# Patient Record
Sex: FEMALE | Race: WHITE | Employment: FULL TIME | URBAN - METROPOLITAN AREA
[De-identification: names, ages, dates, MRNs, and addresses within clinical notes are randomized per-mention and may not be internally consistent; named-entity substitution may affect disease eponyms.]

---

## 2017-01-03 ENCOUNTER — HOSPITAL ENCOUNTER (EMERGENCY)
Age: 57
Discharge: HOME | End: 2017-01-03
Attending: EMERGENCY MEDICINE

## 2017-01-03 VITALS
DIASTOLIC BLOOD PRESSURE: 72 MMHG | WEIGHT: 132 LBS | TEMPERATURE: 97.5 F | HEIGHT: 66 IN | HEART RATE: 84 BPM | OXYGEN SATURATION: 98 % | RESPIRATION RATE: 16 BRPM | BODY MASS INDEX: 21.21 KG/M2 | SYSTOLIC BLOOD PRESSURE: 124 MMHG

## 2017-01-03 DIAGNOSIS — H10.31 ACUTE CONJUNCTIVITIS OF RIGHT EYE, UNSPECIFIED ACUTE CONJUNCTIVITIS TYPE: Primary | ICD-10-CM

## 2017-01-03 RX ORDER — GENTAMICIN SULFATE 3 MG/ML
1 SOLUTION/ DROPS OPHTHALMIC EVERY 4 HOURS
Qty: 5 ML | Refills: 0 | Status: SHIPPED | OUTPATIENT
Start: 2017-01-03 | End: 2017-01-10

## 2017-01-03 RX ORDER — MECLIZINE HYDROCHLORIDE 25 MG/1
TABLET ORAL
COMMUNITY
End: 2018-03-22

## 2017-01-03 RX ORDER — AZELASTINE HYDROCHLORIDE, FLUTICASONE PROPIONATE 137; 50 UG/1; UG/1
SPRAY, METERED NASAL
COMMUNITY

## 2017-01-03 NOTE — DISCHARGE INSTRUCTIONS
Pinkeye: Care Instructions  Your Care Instructions    Pinkeye is redness and swelling of the eye surface and the conjunctiva (the lining of the eyelid and the covering of the white part of the eye). Pinkeye is also called conjunctivitis. Pinkeye is often caused by infection with bacteria or a virus. Dry air, allergies, smoke, and chemicals are other common causes. Pinkeye often clears on its own in 7 to 10 days. Antibiotics only help if the pinkeye is caused by bacteria. Pinkeye caused by infection spreads easily. If an allergy or chemical is causing pinkeye, it will not go away unless you can avoid whatever is causing it. Follow-up care is a key part of your treatment and safety. Be sure to make and go to all appointments, and call your doctor if you are having problems. Its also a good idea to know your test results and keep a list of the medicines you take. How can you care for yourself at home? · Wash your hands often. Always wash them before and after you treat pinkeye or touch your eyes or face. · Use moist cotton or a clean, wet cloth to remove crust. Wipe from the inside corner of the eye to the outside. Use a clean part of the cloth for each wipe. · Put cold or warm wet cloths on your eye a few times a day if the eye hurts. · Do not wear contact lenses or eye makeup until the pinkeye is gone. Throw away any eye makeup you were using when you got pinkeye. Clean your contacts and storage case. If you wear disposable contacts, use a new pair when your eye has cleared and it is safe to wear contacts again. · If the doctor gave you antibiotic ointment or eyedrops, use them as directed. Use the medicine for as long as instructed, even if your eye starts looking better soon. Keep the bottle tip clean, and do not let it touch the eye area. · To put in eyedrops or ointment:  ¨ Tilt your head back, and pull your lower eyelid down with one finger.   ¨ Drop or squirt the medicine inside the lower lid.  ¨ Close your eye for 30 to 60 seconds to let the drops or ointment move around. ¨ Do not touch the ointment or dropper tip to your eyelashes or any other surface. · Do not share towels, pillows, or washcloths while you have pinkeye. When should you call for help? Call your doctor now or seek immediate medical care if:  · You have pain in your eye, not just irritation on the surface. · You have a change in vision or loss of vision. · You have an increase in discharge from the eye. · Your eye has not started to improve or begins to get worse within 48 hours after you start using antibiotics. · Pinkeye lasts longer than 7 days. Watch closely for changes in your health, and be sure to contact your doctor if you have any problems. Where can you learn more? Go to http://geraldo-samuel.info/. Enter Y392 in the search box to learn more about \"Pinkeye: Care Instructions. \"  Current as of: May 27, 2016  Content Version: 11.1  © 0566-5574 [employer], [employer]. Care instructions adapted under license by [employer] (which disclaims liability or warranty for this information). If you have questions about a medical condition or this instruction, always ask your healthcare professional. [address] any warranty or liability for your use of this information.

## 2017-01-03 NOTE — UC PROVIDER NOTE
Patient is a 61 y.o. female presenting with eye problem. The history is provided by the patient. Eye Problem    This is a new problem. The current episode started more than 2 days ago. The problem occurs constantly. The problem has been gradually worsening. The right eye is affected. The injury mechanism was none. The patient is experiencing no pain. There is no history of trauma to the eye. There is no known exposure to pink eye. She does not wear contacts. Associated symptoms include discharge (mild this AM), foreign body sensation, eye redness and itching. Pertinent negatives include no numbness, no blurred vision, no decreased vision, no double vision, no photophobia, no tingling, no pain and no dizziness. She has tried eye drops for the symptoms. The treatment provided no relief. Past Medical History   Diagnosis Date    Anxiety 10/25/2012    Fatigue     Headache     HTN (hypertension) 10/25/2012    Insomnia 10/25/2012    Lumbar back pain 10/25/2012     Steroid injections,  Dr. Abhi Niño RA (rheumatoid arthritis) ([facility] [address] 10/25/2012    Skipped beats         Past Surgical History   Procedure Laterality Date    Hx  section       2    Hx heent       deviated septum    Hx orthopaedic       R metatarsal joint fusion    Hx breast reduction      Pr neurological procedure unlisted       lumbar radio frequency neurotomy         Family History   Problem Relation Age of Onset    Hypertension Mother    [employer] Asthma Mother     Arthritis-rheumatoid Mother     Asthma Maternal Aunt     Diabetes Maternal Grandfather     Asthma Other     Other Other      scleroderma    Cancer Other     Migraines Other     Stroke Other         Social History     Social History    Marital status:      Spouse name: N/A    Number of children: N/A    Years of education: N/A     Occupational History    Not on file.      Social History Main Topics    Smoking status: Never Smoker    Smokeless tobacco: Never Used    Alcohol use Yes      Comment: socially    Drug use: No    Sexual activity: No     Other Topics Concern    Not on file     Social History Narrative                ALLERGIES: Azithromycin; Carafate [sucralfate]; Cephalexin hcl; Enbrel [etanercept]; Penicillin g; Protonix [pantoprazole]; and Tamiflu [oseltamivir phosphate]    Review of Systems   Eyes: Positive for discharge (mild this AM) and redness. Negative for blurred vision, double vision, photophobia and pain. Skin: Positive for itching. Neurological: Negative for dizziness, tingling and numbness. Vitals:    01/03/17 1154 01/03/17 1158   BP:  124/72   Pulse:  84   Resp:  16   Temp:  97.5 °F (36.4 °C)   SpO2:  98%   Weight: 59.9 kg (132 lb)    Height: 5' 6\" (1.676 m)        Physical Exam   Constitutional: She appears well-developed and well-nourished. HENT:   Head: Normocephalic and atraumatic. Eyes: EOM and lids are normal. Pupils are equal, round, and reactive to light. Lids are everted and swept, no foreign bodies found. Right eye exhibits no chemosis, no discharge, no exudate and no hordeolum. No foreign body present in the right eye. Left eye exhibits no chemosis, no discharge, no exudate and no hordeolum. No foreign body present in the left eye. Right conjunctiva is injected. Right conjunctiva has no hemorrhage. Left conjunctiva is not injected. No scleral icterus. Fluorescein exam performed with proparacaine and woods lamp and no focal uptake was noted and no FB seen. Pt tolerated the procedure well   Skin: Skin is warm and dry. No rash noted. No erythema. No pallor. Psychiatric: She has a normal mood and affect. Her behavior is normal. Judgment and thought content normal.   Nursing note and vitals reviewed. MDM     Differential Diagnosis; Clinical Impression; Plan:     CLINICAL IMPRESSION:  Acute conjunctivitis of right eye, unspecified acute conjunctivitis type  (primary encounter diagnosis)    Plan:  1. genoptic  2.  Fu with optho  3. Risk of Significant Complications, Morbidity, and/or Mortality:   Presenting problems: Moderate  Management options:   Moderate  Progress:   Patient progress:  Stable      Procedures

## 2017-08-19 RX ORDER — ELETRIPTAN HYDROBROMIDE 40 MG/1
TABLET, FILM COATED ORAL
Qty: 9 TAB | Refills: 5 | Status: SHIPPED | OUTPATIENT
Start: 2017-08-19 | End: 2018-01-08 | Stop reason: SDUPTHER

## 2017-09-12 ENCOUNTER — OFFICE VISIT (OUTPATIENT)
Dept: ONCOLOGY | Age: 57
End: 2017-09-12

## 2017-09-12 VITALS
RESPIRATION RATE: 16 BRPM | DIASTOLIC BLOOD PRESSURE: 89 MMHG | OXYGEN SATURATION: 95 % | BODY MASS INDEX: 20.06 KG/M2 | SYSTOLIC BLOOD PRESSURE: 126 MMHG | HEIGHT: 66 IN | WEIGHT: 124.8 LBS | HEART RATE: 91 BPM | TEMPERATURE: 98.9 F

## 2017-09-12 DIAGNOSIS — M06.9 RHEUMATOID ARTHRITIS, INVOLVING UNSPECIFIED SITE, UNSPECIFIED RHEUMATOID FACTOR PRESENCE: ICD-10-CM

## 2017-09-12 DIAGNOSIS — D61.818 PANCYTOPENIA (HCC): Primary | ICD-10-CM

## 2017-09-12 DIAGNOSIS — N39.0 URINARY TRACT INFECTION WITHOUT HEMATURIA, SITE UNSPECIFIED: ICD-10-CM

## 2017-09-12 DIAGNOSIS — D61.818 PANCYTOPENIA (HCC): ICD-10-CM

## 2017-09-12 RX ORDER — LEFLUNOMIDE 10 MG/1
TABLET ORAL
COMMUNITY
Start: 2017-09-04 | End: 2018-03-22

## 2017-09-12 RX ORDER — CITALOPRAM 20 MG/1
20 TABLET, FILM COATED ORAL DAILY
COMMUNITY
Start: 2017-08-30 | End: 2020-03-16

## 2017-09-12 RX ORDER — NITROFURANTOIN 25; 75 MG/1; MG/1
100 CAPSULE ORAL 2 TIMES DAILY
Qty: 10 CAP | Refills: 0 | Status: SHIPPED | OUTPATIENT
Start: 2017-09-12 | End: 2017-09-22 | Stop reason: ALTCHOICE

## 2017-09-12 RX ORDER — EPINEPHRINE 0.3 MG/.3ML
INJECTION SUBCUTANEOUS
COMMUNITY
Start: 2017-08-31 | End: 2018-07-17

## 2017-09-12 NOTE — Clinical Note
Please fax note to Dr. Mei Willis  Notify patient that urine is growing E coli bacteria and check if she is feeling better on the antibiotics

## 2017-09-12 NOTE — MR AVS SNAPSHOT
Visit Information Date & Time Provider Department Dept. Phone Encounter #  
 9/12/2017  3:00 PM Caty Zamora MD [facility] at [place] [id number]  Your Appointments 9/26/2017  3:00 PM  
Follow Up with Misbah Martel DO [facility] at [facility] JUAREZ) Appt Note: 2 Week Follow Up  
 [address] [place] [address] [id number]  
[phone]  
  
   
 [address] [address] Upcoming Health Maintenance Date Due Hepatitis C Screening 1960 DTaP/Tdap/Td series (1 - Tdap) 9/7/1980 PAP AKA CERVICAL CYTOLOGY 1/1/2016 BREAST CANCER SCRN MAMMOGRAM 12/16/2016 INFLUENZA AGE 9 TO ADULT 8/1/2017 COLONOSCOPY 1/1/2021 Allergies as of 9/12/2017  Review Complete On: 9/12/2017 By: Karyle Dessert, LPN Severity Noted Reaction Type Reactions Azithromycin  10/25/2012   Side Effect Rash Carafate [Sucralfate]  07/07/2016    Swelling Cephalexin Hcl  10/25/2012   Side Effect Rash Enbrel [Etanercept]  10/20/2015    Other (comments) Blisters at injection sites Penicillin G  10/25/2012    Rash Protonix [Pantoprazole]  07/07/2016    Swelling Tamiflu [Oseltamivir Phosphate]  12/29/2014   Systemic Swelling Current Immunizations  Reviewed on 11/21/2014 Name Date Influenza High Dose Vaccine PF 10/15/2014 Influenza Vaccine Whole 10/1/2012 Pneumococcal Vaccine (Unspecified Type) 11/21/2013 Not reviewed this visit You Were Diagnosed With   
  
 Codes Comments Pancytopenia ([facility]    -  Primary ICD-10-CM: G63.707 ICD-9-CM: 284.19 Vitals BP Pulse Temp Resp Height(growth percentile) Weight(growth percentile) 126/89 (BP 1 Location: Right arm, BP Patient Position: Sitting) 91 98.9 °F (37.2 °C) (Oral) 16 5' 6\" (1.676 m) 124 lb 12.8 oz (56.6 kg) SpO2 BMI OB Status Smoking Status 95% 20.14 kg/m2 Postmenopausal Never Smoker BMI and BSA Data Body Mass Index Body Surface Area  
 20.14 kg/m 2 1.62 m 2 Preferred Pharmacy Pharmacy Name Phone [facility], [address] [phone] Your Updated Medication List  
  
   
This list is accurate as of: 9/12/17  3:50 PM.  Always use your most recent med list.  
  
  
  
  
 citalopram 20 mg tablet Commonly known as:  CELEXA  
  
 cyclobenzaprine 10 mg tablet Commonly known as:  FLEXERIL Take 1 Tab by mouth three (3) times daily as needed. DYMISTA 137-50 mcg/spray [place] Generic drug:  azelastine-fluticasone  
by Nasal route. * EPINEPHrine 0.15 mg/0.3 mL injection Commonly known as:  EPIPEN JR  
0.15 mg by IntraMUSCular route once as needed. * EPINEPHrine 0.3 mg/0.3 mL injection Commonly known as:  EPIPEN  
  
 fluticasone 110 mcg/actuation inhaler Commonly known as:  FLOVENT HFA Take 2 Puffs by inhalation two (2) times a day. leflunomide 10 mg tablet Commonly known as:  ARAVA  
  
 losartan 50 mg tablet Commonly known as:  COZAAR  
TAKE ONE TABLET BY MOUTH DAILY  
  
 meclizine 25 mg tablet Commonly known as:  ANTIVERT Take  by mouth three (3) times daily as needed. MOVANTIK 25 mg Tab tablet Generic drug:  naloxegol  
  
 nitrofurantoin (macrocrystal-monohydrate) 100 mg capsule Commonly known as:  MACROBID Take 1 Cap by mouth two (2) times a day. Indications: BACTERIAL URINARY TRACT INFECTION  
  
 * ORENCIA 125 mg/mL Syrg Generic drug:  abatacept * ORENCIA CLICKJECT 809 mg/mL [place] Generic drug:  abatacept PROBIOTIC 4X 10-15 mg Tbec Generic drug:  B.infantis-B.ani-B.long-B.bifi Take 2 tablets by mouth daily. RELPAX 40 mg tablet Generic drug:  eletriptan TAKE ONE TABLET BY MOUTH AT ONSET OF HEADACHE. MAY REPEAT IN 2 HOURS IF NEEDED.  **DO NOT TAKE MORE THAN 2 TABELTS PER DAY**  
  
 REMICADE IV  
 by IntraVENous route every six (6) weeks. sucralfate 100 mg/mL suspension Commonly known as:  Denis Salvatorech Take 10 mL by mouth four (4) times daily. * VENTOLIN HFA 90 mcg/actuation inhaler Generic drug:  albuterol INHALE TWO PUFFS BY MOUTH EVERY 4 HOURS AS NEEDED  
  
 * albuterol 2.5 mg /3 mL (0.083 %) nebulizer solution Commonly known as:  PROVENTIL VENTOLIN  
3 mL by Nebulization route every four (4) hours as needed for Wheezing. VITAMIN B-12 1,000 mcg tablet Generic drug:  cyanocobalamin Take 1,000 mcg by mouth daily. VITAMIN C 500 mg tablet Generic drug:  ascorbic acid (vitamin C) Take 1,000 mg by mouth daily. VITAMIN D3 2,000 unit Tab Generic drug:  cholecalciferol (vitamin D3) Take 2 tablets by mouth daily. * Notice: This list has 6 medication(s) that are the same as other medications prescribed for you. Read the directions carefully, and ask your doctor or other care provider to review them with you. Prescriptions Sent to Pharmacy Refills  
 nitrofurantoin, macrocrystal-monohydrate, (MACROBID) 100 mg capsule 0 Sig: Take 1 Cap by mouth two (2) times a day. Indications: BACTERIAL URINARY TRACT INFECTION Class: Normal  
 Pharmacy: [facility], [address] #: [phone] Route: Oral  
  
To-Do List   
 09/12/2017 Lab:  CBC WITH AUTOMATED DIFF   
  
 09/12/2017 Imaging:  CT BX BONE MARROW NDL/TROCAR   
  
 09/12/2017 Microbiology:  CULTURE, URINE   
  
 09/12/2017 Lab:  FERRITIN   
  
 09/12/2017 Lab:  GAMMOPATHY EVAL, SPEP/KHOI, IG QT/FLC   
  
 09/12/2017 Lab:  HEP B SURFACE AG   
  
 09/12/2017 Lab:  HEPATITIS C AB   
  
 09/12/2017 Lab:  HIV 1/2 AG/AB, 4TH GENERATION,W RFLX CONFIRM   
  
 09/12/2017 Lab:  IRON PROFILE   
  
 09/12/2017 Lab:  LD   
  
 09/12/2017 Lab:  METHYLMALONIC ACID   
  
 09/12/2017 Pathology:  PATHOLOGIST REVIEW SMEARS   
  
 09/12/2017 Lab:  URINALYSIS W/ RFLX MICROSCOPIC   
  
 09/12/2017 Lab:  VITAMIN B12 Introducing [facility] SERVICES! Dear Charlena Fleischer: Thank you for requesting a [employer] account. Our records indicate that you already have an active [employer] account. You can access your account anytime at [email or web address]"[email or web address]. [employer]/"[employer] Did you know that you can access your hospital and ER discharge instructions at any time in [employer]? You can also review all of your test results from your hospital stay or ER visit. Additional Information If you have questions, please visit the Frequently Asked Questions section of the [employer] website at [email or web address]/"[email or web address]/. Remember, [employer] is NOT to be used for urgent needs. For medical emergencies, dial 911. Now available from your iPhone and Android! Please provide this summary of care documentation to your next provider. Your primary care clinician is listed as Gi De Jesus. If you have any questions after today's visit, please call [phone].

## 2017-09-12 NOTE — PROGRESS NOTES
Luiz Whitley is a 61 y.o. female new patient referred to provider by Dr. Wan Degroot for low blood count. Patient states she has a migraine. Pain states she has burning, pain and frequency when urinating; patient states she think she has a urinary infection; symptoms started yesterday. Patient states she feels bad and her glands feel swollen. VS stable.

## 2017-09-12 NOTE — PROGRESS NOTES
Hematology Consult    REASON FOR CONSULT: Leucopenia  REQUESTED BY: Dr. Luis Wolf: Ms. Jalyn Casiano is a 61 y.o. female with anxiety, hypertension, insomnia, history of gluten sensitivity, asthma, migraines, hyperlipidemia, nonmelanoma skin cancer, rheumatoid arthritis  who presents for evaluation of pancytopenia. She has previously received treatments with Arava and Humira followed by Acterma without much benefit. She was then changed to CellCept and Plaquenil. This was followed by Debbie Subramanian but she then developed a flare of uveitis at which point she was changed to Remicade about 4-5 months ago. Her most recent absolute neutrophil count was 500. She has had leukopenia for several months now with an [address] of 1100 on 17, 600 on 17. During this time she has had a relatively stable hemoglobin between 11-12 g/dL and normal platelet counts. She has generalized malaise for months, She has a sore throat, has painful neck glands in her neck for 2-3 days. No bleeding, has chronic HA, no fevers, no energy, has no will to exercise, has lost appetite and weight, the latter about 20 lb in 6 months. No bleeding, no new rashes. She was started on Celexa for a week for anxiety due to a lot of personal stress. She has a h/o recurrent sinopulmonary infections. She is having dysuria    FH of anemia, generations have had rheumatoid arthritis. Never smokes, occasional ETOH.        Past Medical History:   Diagnosis Date    Anxiety 10/25/2012    Fatigue     Headache     HTN (hypertension) 10/25/2012    Insomnia 10/25/2012    Lumbar back pain 10/25/2012    Steroid injections,  [facility] Enriquez [facility] 10/25/2012    Skipped beats        Past Surgical History:   Procedure Laterality Date    HX BREAST REDUCTION      HX  SECTION      2    HX HEENT      deviated septum    HX ORTHOPAEDIC      R metatarsal joint fusion    NEUROLOGICAL PROCEDURE UNLISTED      lumbar radio frequency neurotomy       Allergies   Allergen Reactions    Azithromycin Rash    Carafate [Sucralfate] Swelling    Cephalexin Hcl Rash    Enbrel [Etanercept] Other (comments)     Blisters at injection sites    Penicillin G Rash    Protonix [Pantoprazole] Swelling    Tamiflu [Oseltamivir Phosphate] Swelling       Current Outpatient Prescriptions   Medication Sig Dispense Refill    citalopram (CELEXA) 20 mg tablet       EPINEPHrine (EPIPEN) 0.3 mg/0.3 mL injection       INFLIXIMAB (REMICADE IV) by IntraVENous route every six (6) weeks.  RELPAX 40 mg tablet TAKE ONE TABLET BY MOUTH AT ONSET OF HEADACHE. MAY REPEAT IN 2 HOURS IF NEEDED. **DO NOT TAKE MORE THAN 2 TABELTS PER DAY** 9 Tab 5    azelastine-fluticasone (DYMISTA) 137-50 mcg/spray spry by Nasal route.  MOVANTIK 25 mg tab tablet       losartan (COZAAR) 50 mg tablet TAKE ONE TABLET BY MOUTH DAILY 90 Tab 0    albuterol (PROVENTIL VENTOLIN) 2.5 mg /3 mL (0.083 %) nebulizer solution 3 mL by Nebulization route every four (4) hours as needed for Wheezing. 1 Package 4    VENTOLIN HFA 90 mcg/actuation inhaler INHALE TWO PUFFS BY MOUTH EVERY 4 HOURS AS NEEDED 1 Inhaler 4    cyclobenzaprine (FLEXERIL) 10 mg tablet Take 1 Tab by mouth three (3) times daily as needed. 90 Tab 1    fluticasone (FLOVENT HFA) 110 mcg/actuation inhaler Take 2 Puffs by inhalation two (2) times a day. 1 Inhaler 5    ascorbic acid (VITAMIN C) 500 mg tablet Take 1,000 mg by mouth daily.  cholecalciferol, vitamin D3, (VITAMIN D3) 2,000 unit Tab Take 2 tablets by mouth daily.  cyanocobalamin (VITAMIN B-12) 1,000 mcg tablet Take 1,000 mcg by mouth daily.  B.infantis-B.ani-B.long-B.bifi (PROBIOTIC 4X) 10-15 mg Tab Take 2 tablets by mouth daily.  leflunomide (ARAVA) 10 mg tablet       meclizine (ANTIVERT) 25 mg tablet Take  by mouth three (3) times daily as needed.       ORENCIA CLICKJECT 845 mg/mL Ram[facility]rez      [facility] 125 mg/mL syrg       sucralfate (CARAFATE) 100 mg/mL suspension Take 10 mL by mouth four (4) times daily. 1000 mL 1    EPINEPHrine (EPIPEN JR) 0.15 mg/0.3 mL (1:2,000) injection 0.15 mg by IntraMUSCular route once as needed. Social History     Social History    Marital status:      Spouse name: N/A    Number of children: N/A    Years of education: N/A     Social History Main Topics    Smoking status: Never Smoker    Smokeless tobacco: Never Used    Alcohol use Yes      Comment: socially    Drug use: No    Sexual activity: No     Other Topics Concern    None     Social History Narrative       Family History   Problem Relation Age of Onset    Hypertension Mother     Asthma Mother     Arthritis-rheumatoid Mother     Asthma Maternal Aunt     Diabetes Maternal Grandfather     Asthma Other     Other Other      scleroderma    Cancer Other     Migraines Other     Stroke Other        ROS  A 12 point review of systems was obtained and is negative except as listed in HPI.   ECOG PS is 1  Emotional well being addressed and patient is coping well    Physical Examination:   Visit Vitals    /89 (BP 1 Location: Right arm, BP Patient Position: Sitting)    Pulse 91    Temp 98.9 °F (37.2 °C) (Oral)    Resp 16    Ht 5' 6\" (1.676 m)    Wt 124 lb 12.8 oz (56.6 kg)    SpO2 95%    BMI 20.14 kg/m2     General appearance - alert, well appearing, and in no distress  Mental status - oriented to person, place, and time  Mouth - mucous membranes moist, pharynx normal without lesions  Neck - supple, no significant adenopathy  Lymphatics - no palpable lymphadenopathy, no hepatosplenomegaly  Chest - clear to auscultation, no wheezes, rales or rhonchi, symmetric air entry  Heart - normal rate, regular rhythm, normal S1, S2, no murmurs, rubs, clicks or gallops  Abdomen - soft, nontender, nondistended, no masses or organomegaly, bowel sounds present  Back exam - full range of motion, no tenderness, palpable spasm or pain on motion  Neurological - normal speech, no focal findings or movement disorder noted  Musculoskeletal - no joint tenderness, deformity or swelling  Extremities - peripheral pulses normal, no pedal edema, no clubbing or cyanosis  Skin - normal coloration and turgor, no rashes, no suspicious skin lesions noted    LABS  Lab Results   Component Value Date/Time    WBC 5.8 05/16/2016 02:00 PM    HGB 13.1 05/16/2016 02:00 PM    HCT 40.0 05/16/2016 02:00 PM    PLATELET 569 53/82/0859 02:00 PM    MCV 90.9 05/16/2016 02:00 PM    ABS. NEUTROPHILS 4.0 05/16/2016 02:00 PM     Lab Results   Component Value Date/Time    Sodium 139 05/16/2016 02:00 PM    Potassium 4.5 05/16/2016 02:00 PM    Chloride 102 05/16/2016 02:00 PM    CO2 29 05/16/2016 02:00 PM    Glucose 103 05/16/2016 02:00 PM    BUN 15 05/16/2016 02:00 PM    Creatinine 0.88 05/16/2016 02:00 PM    GFR est AA >60 05/16/2016 02:00 PM    GFR est non-AA >60 05/16/2016 02:00 PM    Calcium 9.8 05/16/2016 02:00 PM    BUN (POC) 16 05/16/2016 02:02 PM    Calcium, ionized (POC) 1.19 05/16/2016 02:02 PM     Lab Results   Component Value Date/Time    AST (SGOT) 28 05/16/2016 02:00 PM    Alk. phosphatase 93 05/16/2016 02:00 PM    Protein, total 7.3 05/16/2016 02:00 PM    Albumin 4.0 05/16/2016 02:00 PM    Globulin 3.3 05/16/2016 02:00 PM    A-G Ratio 1.2 05/16/2016 02:00 PM         ASSESSMENT  Ms. Roselia Lyon is a 61 y.o. female with RA on several prior treatments (arava , Humira , Actemra , CellCept ,Plaquenil, Enbrel, Sharlee Mullet ) and was recently transitioned to Remicade who is seen for  persistent neutropenia      PLAN  Neutropenia  Persistent, may be secondary to her RA medications -notably Actemra, cellcept, plaquenil  However, patients with RA are at increased risk of hematologic malignancies in particular Lymphoma and we will rule that out    - cbc diff, iron profile, ferritin, gammopathy eval, HIV, hep panel, LD, B12, BM biopsy    Dysuria- UTI  UA, cultures, empiric Macrobid    RA   Management per Dr. Jun Bishop    HTN  On Cozaar    RTC 2 weeks to review results       Sridhar Dallas MD    MsHollie Dunbar has a reminder for a \"due or due soon\" health maintenance. I have asked that she contact her primary care provider for follow-up on this health maintenance.

## 2017-09-15 LAB
ALBUMIN SERPL ELPH-MCNC: 4.2 G/DL (ref 2.9–4.4)
ALBUMIN/GLOB SERPL: 1.5 {RATIO} (ref 0.7–1.7)
ALPHA1 GLOB SERPL ELPH-MCNC: 0.2 G/DL (ref 0–0.4)
ALPHA2 GLOB SERPL ELPH-MCNC: 0.7 G/DL (ref 0.4–1)
APPEARANCE UR: ABNORMAL
B-GLOBULIN SERPL ELPH-MCNC: 0.9 G/DL (ref 0.7–1.3)
BACTERIA #/AREA URNS HPF: ABNORMAL /[HPF]
BACTERIA UR CULT: ABNORMAL
BASOPHILS # BLD AUTO: 0 X10E3/UL (ref 0–0.2)
BASOPHILS NFR BLD AUTO: 0 %
BILIRUB UR QL STRIP: NEGATIVE
CASTS URNS QL MICRO: ABNORMAL /LPF
COLOR UR: YELLOW
EOSINOPHIL # BLD AUTO: 0 X10E3/UL (ref 0–0.4)
EOSINOPHIL NFR BLD AUTO: 1 %
EPI CELLS #/AREA URNS HPF: ABNORMAL /HPF
ERYTHROCYTE [DISTWIDTH] IN BLOOD BY AUTOMATED COUNT: 13.9 % (ref 12.3–15.4)
FERRITIN SERPL-MCNC: 38 NG/ML (ref 15–150)
GAMMA GLOB SERPL ELPH-MCNC: 1.1 G/DL (ref 0.4–1.8)
GLOBULIN SER-MCNC: 3 G/DL (ref 2.2–3.9)
GLUCOSE UR QL: NEGATIVE
HBV SURFACE AG SERPL QL IA: NEGATIVE
HCT VFR BLD AUTO: 38.6 % (ref 34–46.6)
HCV AB S/CO SERPL IA: <0.1 S/CO RATIO (ref 0–0.9)
HGB BLD-MCNC: 12.8 G/DL (ref 11.1–15.9)
HGB UR QL STRIP: ABNORMAL
HIV 1+2 AB+HIV1 P24 AG SERPL QL IA: NON REACTIVE
IGA SERPL-MCNC: 114 MG/DL (ref 87–352)
IGG SERPL-MCNC: 990 MG/DL (ref 700–1600)
IGM SERPL-MCNC: 203 MG/DL (ref 26–217)
IMM GRANULOCYTES # BLD: 0 X10E3/UL (ref 0–0.1)
IMM GRANULOCYTES NFR BLD: 0 %
INTERPRETATION SERPL IEP-IMP: NORMAL
IRON SATN MFR SERPL: 11 % (ref 15–55)
IRON SERPL-MCNC: 35 UG/DL (ref 27–159)
KAPPA LC FREE SER-MCNC: 12.8 MG/L (ref 3.3–19.4)
KAPPA LC FREE/LAMBDA FREE SER: 0.79 {RATIO} (ref 0.26–1.65)
KETONES UR QL STRIP: NEGATIVE
LAMBDA LC FREE SERPL-MCNC: 16.2 MG/L (ref 5.7–26.3)
LDH SERPL-CCNC: 213 IU/L (ref 119–226)
LEUKOCYTE ESTERASE UR QL STRIP: ABNORMAL
LYMPHOCYTES # BLD AUTO: 1.4 X10E3/UL (ref 0.7–3.1)
LYMPHOCYTES NFR BLD AUTO: 34 %
M PROTEIN SERPL ELPH-MCNC: NORMAL G/DL
MCH RBC QN AUTO: 29.9 PG (ref 26.6–33)
MCHC RBC AUTO-ENTMCNC: 33.2 G/DL (ref 31.5–35.7)
MCV RBC AUTO: 90 FL (ref 79–97)
METHYLMALONATE SERPL-SCNC: 116 NMOL/L (ref 0–378)
MICRO URNS: ABNORMAL
MONOCYTES # BLD AUTO: 0.4 X10E3/UL (ref 0.1–0.9)
MONOCYTES NFR BLD AUTO: 9 %
MUCOUS THREADS URNS QL MICRO: PRESENT
NEUTROPHILS # BLD AUTO: 2.3 X10E3/UL (ref 1.4–7)
NEUTROPHILS NFR BLD AUTO: 56 %
NITRITE UR QL STRIP: POSITIVE
PATH REV BLD -IMP: NORMAL
PATHOLOGIST NAME: NORMAL
PH UR STRIP: 7.5 [PH] (ref 5–7.5)
PLATELET # BLD AUTO: 167 X10E3/UL (ref 150–379)
PLEASE NOTE:, 149534: NORMAL
PROT SERPL-MCNC: 7.2 G/DL (ref 6–8.5)
PROT UR QL STRIP: ABNORMAL
RBC # BLD AUTO: 4.28 X10E6/UL (ref 3.77–5.28)
RBC #/AREA URNS HPF: >30 /HPF
SP GR UR: 1.02 (ref 1–1.03)
TIBC SERPL-MCNC: 315 UG/DL (ref 250–450)
UIBC SERPL-MCNC: 280 UG/DL (ref 131–425)
UROBILINOGEN UR STRIP-MCNC: 1 MG/DL (ref 0.2–1)
VIT B12 SERPL-MCNC: 841 PG/ML (ref 211–946)
WBC # BLD AUTO: 4.2 X10E3/UL (ref 3.4–10.8)
WBC #/AREA URNS HPF: >30 /HPF

## 2017-09-16 PROBLEM — D61.818 PANCYTOPENIA (HCC): Status: ACTIVE | Noted: 2017-09-16

## 2017-09-16 PROBLEM — M06.9 ATROPHIC ARTHRITIS (HCC): Status: ACTIVE | Noted: 2017-09-16

## 2017-09-16 PROBLEM — N39.0 URINARY TRACT INFECTION WITHOUT HEMATURIA: Status: ACTIVE | Noted: 2017-09-16

## 2017-09-17 ENCOUNTER — DOCUMENTATION ONLY (OUTPATIENT)
Dept: ONCOLOGY | Age: 57
End: 2017-09-17

## 2017-09-17 NOTE — PROGRESS NOTES
Reviewed CBC and all other labs     CBC shows a normal white blood cell count, hemoglobin, platelets  No HIV, hepatitis, and evidence of a gammopathy  Smear is normal  Unremarkable iron studies, normal B12 I called Ms. Jennifer Hadley with these results. It appears that her    Neutropenia was most likely attributable to 1 of her previous rheumatoid arthritis medications. Called CT to cancel her scheduled bone marrow biopsy for tomorrow. She is aware of her urine culture results. Is already feeling much better on the Macrobid.   She will see me in 2 months with CBC and differential.  Nursing please fax      Nursing    All results and my note to Dr. Hester Sees with new rheumatology

## 2017-09-22 ENCOUNTER — OFFICE VISIT (OUTPATIENT)
Dept: PRIMARY CARE CLINIC | Age: 57
End: 2017-09-22

## 2017-09-22 VITALS
BODY MASS INDEX: 20.34 KG/M2 | WEIGHT: 126.6 LBS | SYSTOLIC BLOOD PRESSURE: 144 MMHG | DIASTOLIC BLOOD PRESSURE: 89 MMHG | TEMPERATURE: 98.1 F | OXYGEN SATURATION: 98 % | RESPIRATION RATE: 16 BRPM | HEART RATE: 66 BPM | HEIGHT: 66 IN

## 2017-09-22 DIAGNOSIS — N30.00 ACUTE CYSTITIS WITHOUT HEMATURIA: ICD-10-CM

## 2017-09-22 DIAGNOSIS — I10 ESSENTIAL HYPERTENSION: ICD-10-CM

## 2017-09-22 LAB
BILIRUB UR QL STRIP: NEGATIVE
GLUCOSE UR-MCNC: NEGATIVE MG/DL
KETONES P FAST UR STRIP-MCNC: NEGATIVE MG/DL
PH UR STRIP: 6 [PH] (ref 4.6–8)
PROT UR QL STRIP: NORMAL MG/DL
SP GR UR STRIP: 1.03 (ref 1–1.03)
UA UROBILINOGEN AMB POC: NORMAL (ref 0.2–1)
URINALYSIS CLARITY POC: NORMAL
URINALYSIS COLOR POC: YELLOW
URINE BLOOD POC: NEGATIVE
URINE LEUKOCYTES POC: NORMAL
URINE NITRITES POC: NEGATIVE

## 2017-09-22 RX ORDER — NITROFURANTOIN 25; 75 MG/1; MG/1
100 CAPSULE ORAL 2 TIMES DAILY
Qty: 14 CAP | Refills: 0 | Status: SHIPPED | OUTPATIENT
Start: 2017-09-22 | End: 2017-09-29

## 2017-09-22 NOTE — PATIENT INSTRUCTIONS
Urinary Tract Infection in Pregnancy: Care Instructions  Your Care Instructions    A urinary tract infection, or UTI, is an infection of the bladder and other urinary structures. Most UTIs occur in the bladder. They often cause pain or burning when you urinate. UTI is the most common bacterial infection in pregnancy. If untreated, a UTI could lead to problems such as a kidney infection or  labor. Most UTIs can be cured with antibiotics. Your doctor will prescribe an antibiotic that is safe during pregnancy. Be sure to finish your medicine so that the infection does not spread to your kidneys. Follow-up care is a key part of your treatment and safety. Be sure to make and go to all appointments, and call your doctor if you are having problems. It's also a good idea to know your test results and keep a list of the medicines you take. How can you care for yourself at home? · Take your antibiotics as directed. Do not stop taking them just because you feel better. You need to take the full course of antibiotics. · Drink extra water and other fluids for the next day or two. This will help wash out the bacteria causing the infection. If you have kidney, heart, or liver disease and have to limit fluids, talk with your doctor before you increase the amount of fluids you drink. · Do not drink alcohol. · Urinate often. Try to empty your bladder each time. Preventing UTIs  · Drink plenty of fluids, enough so that your urine is light yellow or clear like water. This helps you urinate often, which clears bacteria from your system. If you have kidney, heart, or liver disease and have to limit fluids, talk with your doctor before you increase the amount of fluids you drink. · Urinate when you first have the urge. · Urinate right after you have sex. This is the best way for women to avoid UTIs. · When going to the bathroom, wipe from front to back to keep bacteria from entering the vagina or urethra.   When should you call for help? Call your doctor now or seek immediate medical care if:  · Symptoms such as fever, chills, nausea, or vomiting get worse or appear for the first time. · You have new pain in your back just below your rib cage. This is called flank pain. · There is new blood or pus in your urine. · You have any problems with your antibiotic medicine. Watch closely for changes in your health, and be sure to contact your doctor if:  · You are not getting better after 1 day (24 hours). · You have new symptoms, such as blood in your urine. Where can you learn more? Go to http://geraldo-samuel.info/. Enter M982 in the search box to learn more about \"Urinary Tract Infection in Pregnancy: Care Instructions. \"  Current as of: March 16, 2017  Content Version: 11.3  © 1013-8995 [employer]. Care instructions adapted under license by [employer] (which disclaims liability or warranty for this information). If you have questions about a medical condition or this instruction, always ask your healthcare professional. [place] [address] any warranty or liability for your use of this information. Urinary Tract Infection in Women: Care Instructions  Your Care Instructions    A urinary tract infection, or UTI, is a general term for an infection anywhere between the kidneys and the urethra (where urine comes out). Most UTIs are bladder infections. They often cause pain or burning when you urinate. UTIs are caused by bacteria and can be cured with antibiotics. Be sure to complete your treatment so that the infection goes away. Follow-up care is a key part of your treatment and safety. Be sure to make and go to all appointments, and call your doctor if you are having problems. It's also a good idea to know your test results and keep a list of the medicines you take. How can you care for yourself at home? · Take your antibiotics as directed.  Do not stop taking them just because you feel better. You need to take the full course of antibiotics. · Drink extra water and other fluids for the next day or two. This may help wash out the bacteria that are causing the infection. (If you have kidney, heart, or liver disease and have to limit fluids, talk with your doctor before you increase your fluid intake.)  · Avoid drinks that are carbonated or have caffeine. They can irritate the bladder. · Urinate often. Try to empty your bladder each time. · To relieve pain, take a hot bath or lay a heating pad set on low over your lower belly or genital area. Never go to sleep with a heating pad in place. To prevent UTIs  · Drink plenty of water each day. This helps you urinate often, which clears bacteria from your system. (If you have kidney, heart, or liver disease and have to limit fluids, talk with your doctor before you increase your fluid intake.)  · Urinate when you need to. · Urinate right after you have sex. · Change sanitary pads often. · Avoid douches, bubble baths, feminine hygiene sprays, and other feminine hygiene products that have deodorants. · After going to the bathroom, wipe from front to back. When should you call for help? Call your doctor now or seek immediate medical care if:  · Symptoms such as fever, chills, nausea, or vomiting get worse or appear for the first time. · You have new pain in your back just below your rib cage. This is called flank pain. · There is new blood or pus in your urine. · You have any problems with your antibiotic medicine. Watch closely for changes in your health, and be sure to contact your doctor if:  · You are not getting better after taking an antibiotic for 2 days. · Your symptoms go away but then come back. Where can you learn more? Go to http://geraldo-samuel.info/. Enter W064 in the search box to learn more about \"Urinary Tract Infection in Women: Care Instructions. \"  Current as of: November 28, 2016  Content Version: 11.3  © 7960-4522 [employer]. Care instructions adapted under license by [employer] (which disclaims liability or warranty for this information). If you have questions about a medical condition or this instruction, always ask your healthcare professional. [address] any warranty or liability for your use of this information.

## 2017-09-22 NOTE — PROGRESS NOTES
Chief Complaint   Patient presents with    Dysuria     c/o recurrent uti's symptoms including dysuria,urinary frequency and abdominal pressure,was seen on 9/12/17 and was given macrobid, states symptoms resolved for a couple of days and then came back,     This note will not be viewable in [address]

## 2017-09-22 NOTE — PROGRESS NOTES
Subjective:     Kaci Judge is a 61 y.o. female who complains of frequency, burning with urination, suprapubic pressure for a few days. Pt has neutropenia, followed by Heme/Onc. She was treated about a week ago for UTI with Macrobid for 5 days. Symptoms seemed to resolved for a few days but then started again. Patient denies flank pain, nausea, vomiting, fever, abdominal pain, unusual vaginal discharge. Patient does not have a history of recurrent UTI. Patient does not have a history of pyelonephritis. Past Medical History:   Diagnosis Date    Anxiety 10/25/2012    Fatigue     Headache     HTN (hypertension) 10/25/2012    Insomnia 10/25/2012    Lumbar back pain 10/25/2012    Steroid injections,  Dr. Lizbeth Carranza RA (rheumatoid arthritis) ([facility] 10/25/2012    Skipped beats      Past Surgical History:   Procedure Laterality Date    HX BREAST REDUCTION      HX  SECTION      2    HX HEENT      deviated septum    HX ORTHOPAEDIC      R metatarsal joint fusion    NEUROLOGICAL PROCEDURE UNLISTED      lumbar radio frequency neurotomy     Allergies   Allergen Reactions    Azithromycin Rash    Carafate [Sucralfate] Swelling    Cephalexin Hcl Rash    Enbrel [Etanercept] Other (comments)     Blisters at injection sites    Penicillin G Rash    Protonix [Pantoprazole] Swelling    Tamiflu [Oseltamivir Phosphate] Swelling     Review of Systems  Pertinent items are noted in HPI. Objective:     Visit Vitals    /89 (BP 1 Location: Left arm, BP Patient Position: Sitting)    Pulse 66    Temp 98.1 °F (36.7 °C) (Oral)    Resp 16    Ht 5' 6\" (1.676 m)    Wt 126 lb 9.6 oz (57.4 kg)    SpO2 98%    BMI 20.43 kg/m2     General:  alert, cooperative, no distress   Abdomen: soft, nontender, nondistended, no masses or organomegaly.     Back:  CVA tenderness absent   :  defer exam     Laboratory:   Urine dipstick shows   Results for orders placed or performed in visit on 17   CULTURE, URINE   Result Value Ref Range    Urine Culture, Routine       Mixed urogenital ellen  50,000-100,000 colony forming units per mL     AMB POC URINALYSIS DIP STICK AUTO W/O MICRO   Result Value Ref Range    Color (UA POC) Yellow     Clarity (UA POC) Slightly Cloudy     Glucose (UA POC) Negative Negative    Bilirubin (UA POC) Negative Negative    Ketones (UA POC) Negative Negative    Specific gravity (UA POC) 1.030 1.001 - 1.035    Blood (UA POC) Negative Negative    pH (UA POC) 6.0 4.6 - 8.0    Protein (UA POC) 1+ Negative mg/dL    Urobilinogen (UA POC) 0.2 mg/dL 0.2 - 1    Nitrites (UA POC) Negative Negative    Leukocyte esterase (UA POC) 1+ Negative           Assessment/Plan:       ICD-10-CM ICD-9-CM    1. Acute cystitis without hematuria N30.00 595.0 AMB POC URINALYSIS DIP STICK AUTO W/O MICRO      CULTURE, URINE   2. Essential hypertension I10 401.9    -Pt w/ hx of HTN. She reports compliance with her meds and is aware her BP has been running high the last few weeks at doctor visit. Encouraged to monitor BP (can check at her school), record, and f/u with her PCP if remains elevated. Chart reviewed, culture indicated susceptibility to macrobid. Will restart Macrobid pending culture. 1. nitrofurantoin  2. Maintain adequate hydration  3. May use OTC pyridium as desired, which will turn urine orange/red color  4. Follow up if symptoms not improving, and prn. Mookie Jara NP  This note will not be viewable in [address]

## 2017-09-22 NOTE — MR AVS SNAPSHOT
Visit Information Date & Time Provider Department Dept. Phone Encounter #  
 9/22/2017 10:15 AM Ana Lilia Javier NP [address] [address] [phone] [id number] Follow-up Instructions Return if symptoms worsen or fail to improve. Your Appointments 9/26/2017  3:00 PM  
Follow Up with Burnie Eisenmenger,  [facility] at [facility] Appt Note: 2 Week Follow Up  
 [address] [place] [address] [address]  
[phone]  
  
   
 [address] [address] Upcoming Health Maintenance Date Due DTaP/Tdap/Td series (1 - Tdap) 9/7/1980 Pneumococcal 19-64 Highest Risk (2 of 3 - PCV13) 11/21/2014 PAP AKA CERVICAL CYTOLOGY 1/1/2016 BREAST CANCER SCRN MAMMOGRAM 12/16/2016 COLONOSCOPY 1/1/2021 Allergies as of 9/22/2017  Review Complete On: 9/22/2017 By: Ana Lilia Javier NP Severity Noted Reaction Type Reactions Azithromycin  10/25/2012   Side Effect Rash Carafate [Sucralfate]  07/07/2016    Swelling Cephalexin Hcl  10/25/2012   Side Effect Rash Enbrel [Etanercept]  10/20/2015    Other (comments) Blisters at injection sites Penicillin G  10/25/2012    Rash Protonix [Pantoprazole]  07/07/2016    Swelling Tamiflu [Oseltamivir Phosphate]  12/29/2014   Systemic Swelling Current Immunizations  Reviewed on 11/21/2014 Name Date Influenza High Dose Vaccine PF 10/15/2014 Influenza Vaccine Whole 10/1/2012 Pneumococcal Vaccine (Unspecified Type) 11/21/2013 Not reviewed this visit You Were Diagnosed With   
  
 Codes Comments Acute cystitis without hematuria    -  Primary ICD-10-CM: N30.00 ICD-9-CM: 595.0 Essential hypertension     ICD-10-CM: I10 
ICD-9-CM: 401.9 Vitals BP Pulse Temp Resp Height(growth percentile) Weight(growth percentile)  144/89 (BP 1 Location: Left arm, BP Patient Position: Sitting) 66 98.1 °F (36.7 °C) (Oral) 16 5' 6\" (1.676 m) 126 lb 9.6 oz (57.4 kg) SpO2 BMI OB Status Smoking Status 98% 20.43 kg/m2 Postmenopausal Never Smoker BMI and BSA Data Body Mass Index Body Surface Area  
 20.43 kg/m 2 1.63 m 2 Preferred Pharmacy Pharmacy Name Phone [facility], [address] [place] [address] Li Dubois Said [phone] Your Updated Medication List  
  
   
This list is accurate as of: 9/22/17 10:44 AM.  Always use your most recent med list.  
  
  
  
  
 citalopram 20 mg tablet Commonly known as:  CELEXA  
  
 cyclobenzaprine 10 mg tablet Commonly known as:  FLEXERIL Take 1 Tab by mouth three (3) times daily as needed. DYMISTA 137-50 mcg/spray [place] Generic drug:  azelastine-fluticasone  
by Nasal route. * EPINEPHrine 0.15 mg/0.3 mL injection Commonly known as:  EPIPEN JR  
0.15 mg by IntraMUSCular route once as needed. * EPINEPHrine 0.3 mg/0.3 mL injection Commonly known as:  EPIPEN  
  
 fluticasone 110 mcg/actuation inhaler Commonly known as:  FLOVENT HFA Take 2 Puffs by inhalation two (2) times a day. leflunomide 10 mg tablet Commonly known as:  ARAVA  
  
 losartan 50 mg tablet Commonly known as:  COZAAR  
TAKE ONE TABLET BY MOUTH DAILY  
  
 meclizine 25 mg tablet Commonly known as:  ANTIVERT Take  by mouth three (3) times daily as needed. MOVANTIK 25 mg Tab tablet Generic drug:  naloxegol  
  
 nitrofurantoin (macrocrystal-monohydrate) 100 mg capsule Commonly known as:  MACROBID Take 1 Cap by mouth two (2) times a day for 7 days. * ORENCIA 125 mg/mL [place] Generic drug:  abatacept * ORENCIA CLICKJECT 543 mg/mL [place] Generic drug:  abatacept PROBIOTIC 4X 10-15 mg Tbec Generic drug:  B.infantis-B.ani-B.long-B.bifi Take 2 tablets by mouth daily. RELPAX 40 mg tablet Generic drug:  eletriptan TAKE ONE TABLET BY MOUTH AT ONSET OF HEADACHE. MAY REPEAT IN 2 HOURS IF NEEDED. **DO NOT TAKE MORE THAN 2 TABELTS PER DAY**  
  
 REMICADE IV  
by IntraVENous route every six (6) weeks. sucralfate 100 mg/mL suspension Commonly known as:  Anastasiya Ankit Take 10 mL by mouth four (4) times daily. * VENTOLIN HFA 90 mcg/actuation inhaler Generic drug:  albuterol INHALE TWO PUFFS BY MOUTH EVERY 4 HOURS AS NEEDED  
  
 * albuterol 2.5 mg /3 mL (0.083 %) nebulizer solution Commonly known as:  PROVENTIL VENTOLIN  
3 mL by Nebulization route every four (4) hours as needed for Wheezing. VITAMIN B-12 1,000 mcg tablet Generic drug:  cyanocobalamin Take 1,000 mcg by mouth daily. VITAMIN C 500 mg tablet Generic drug:  ascorbic acid (vitamin C) Take 1,000 mg by mouth daily. VITAMIN D3 2,000 unit Tab Generic drug:  cholecalciferol (vitamin D3) Take 2 tablets by mouth daily. * Notice: This list has 6 medication(s) that are the same as other medications prescribed for you. Read the directions carefully, and ask your doctor or other care provider to review them with you. Prescriptions Sent to Pharmacy Refills  
 nitrofurantoin, macrocrystal-monohydrate, (MACROBID) 100 mg capsule 0 Sig: Take 1 Cap by mouth two (2) times a day for 7 days. Class: Normal  
 Pharmacy: Michael Garza  at [address] #: [phone] Route: Oral  
  
We Performed the Following AMB POC URINALYSIS DIP STICK AUTO W/O MICRO [72221 CPT(R)] CULTURE, URINE [id number] CPT(R)] Follow-up Instructions Return if symptoms worsen or fail to improve. Patient Instructions Urinary Tract Infection in Pregnancy: Care Instructions Your Care Instructions A urinary tract infection, or UTI, is an infection of the bladder and other urinary structures. Most UTIs occur in the bladder.  They often cause pain or burning when you urinate. UTI is the most common bacterial infection in pregnancy. If untreated, a UTI could lead to problems such as a kidney infection or  labor. Most UTIs can be cured with antibiotics. Your doctor will prescribe an antibiotic that is safe during pregnancy. Be sure to finish your medicine so that the infection does not spread to your kidneys. Follow-up care is a key part of your treatment and safety. Be sure to make and go to all appointments, and call your doctor if you are having problems. It's also a good idea to know your test results and keep a list of the medicines you take. How can you care for yourself at home? · Take your antibiotics as directed. Do not stop taking them just because you feel better. You need to take the full course of antibiotics. · Drink extra water and other fluids for the next day or two. This will help wash out the bacteria causing the infection. If you have kidney, heart, or liver disease and have to limit fluids, talk with your doctor before you increase the amount of fluids you drink. · Do not drink alcohol. · Urinate often. Try to empty your bladder each time. Preventing UTIs · Drink plenty of fluids, enough so that your urine is light yellow or clear like water. This helps you urinate often, which clears bacteria from your system. If you have kidney, heart, or liver disease and have to limit fluids, talk with your doctor before you increase the amount of fluids you drink. · Urinate when you first have the urge. · Urinate right after you have sex. This is the best way for women to avoid UTIs. · When going to the bathroom, wipe from front to back to keep bacteria from entering the vagina or urethra. When should you call for help? Call your doctor now or seek immediate medical care if: · Symptoms such as fever, chills, nausea, or vomiting get worse or appear for the first time. · You have new pain in your back just below your rib cage. This is called flank pain. · There is new blood or pus in your urine. · You have any problems with your antibiotic medicine. Watch closely for changes in your health, and be sure to contact your doctor if: 
· You are not getting better after 1 day (24 hours). · You have new symptoms, such as blood in your urine. Where can you learn more? Go to http://geraldo-samuel.info/. Enter M982 in the search box to learn more about \"Urinary Tract Infection in Pregnancy: Care Instructions. \" Current as of: March 16, 2017 Content Version: 11.3 © 5849-6882 [employer]. Care instructions adapted under license by [employer] (which disclaims liability or warranty for this information). If you have questions about a medical condition or this instruction, always ask your healthcare professional. [place] [address] any warranty or liability for your use of this information. Urinary Tract Infection in Women: Care Instructions Your Care Instructions A urinary tract infection, or UTI, is a general term for an infection anywhere between the kidneys and the urethra (where urine comes out). Most UTIs are bladder infections. They often cause pain or burning when you urinate. UTIs are caused by bacteria and can be cured with antibiotics. Be sure to complete your treatment so that the infection goes away. Follow-up care is a key part of your treatment and safety. Be sure to make and go to all appointments, and call your doctor if you are having problems. It's also a good idea to know your test results and keep a list of the medicines you take. How can you care for yourself at home? · Take your antibiotics as directed. Do not stop taking them just because you feel better. You need to take the full course of antibiotics. · Drink extra water and other fluids for the next day or two.  This may help wash out the bacteria that are causing the infection. (If you have kidney, heart, or liver disease and have to limit fluids, talk with your doctor before you increase your fluid intake.) · Avoid drinks that are carbonated or have caffeine. They can irritate the bladder. · Urinate often. Try to empty your bladder each time. · To relieve pain, take a hot bath or lay a heating pad set on low over your lower belly or genital area. Never go to sleep with a heating pad in place. To prevent UTIs · Drink plenty of water each day. This helps you urinate often, which clears bacteria from your system. (If you have kidney, heart, or liver disease and have to limit fluids, talk with your doctor before you increase your fluid intake.) · Urinate when you need to. · Urinate right after you have sex. · Change sanitary pads often. · Avoid douches, bubble baths, feminine hygiene sprays, and other feminine hygiene products that have deodorants. · After going to the bathroom, wipe from front to back. When should you call for help? Call your doctor now or seek immediate medical care if: · Symptoms such as fever, chills, nausea, or vomiting get worse or appear for the first time. · You have new pain in your back just below your rib cage. This is called flank pain. · There is new blood or pus in your urine. · You have any problems with your antibiotic medicine. Watch closely for changes in your health, and be sure to contact your doctor if: 
· You are not getting better after taking an antibiotic for 2 days. · Your symptoms go away but then come back. Where can you learn more? Go to http://geraldo-samuel.info/. Enter H686 in the search box to learn more about \"Urinary Tract Infection in Women: Care Instructions. \" Current as of: November 28, 2016 Content Version: 11.3 © 7251-5716 [employer].  Care instructions adapted under license by [address] (which disclaims liability or warranty for this information). If you have questions about a medical condition or this instruction, always ask your healthcare professional. [address] any warranty or liability for your use of this information. Introducing [facility] & HEALTH SERVICES! Dear Jewel Franco: Thank you for requesting a [employer] account. Our records indicate that you already have an active [employer] account. You can access your account anytime at [email or web address] Did you know that you can access your hospital and ER discharge instructions at any time in [employer]? You can also review all of your test results from your hospital stay or ER visit. Additional Information If you have questions, please visit the Frequently Asked Questions section of the [employer] website at [email or web address] Remember, [employer] is NOT to be used for urgent needs. For medical emergencies, dial 911. Now available from your iPhone and Android! Please provide this summary of care documentation to your next provider. Your primary care clinician is listed as Catrina Brown. If you have any questions after today's visit, please call [phone].

## 2017-09-25 LAB — BACTERIA UR CULT: NORMAL

## 2017-09-26 NOTE — PROGRESS NOTES
Please inform pt that the urine culture shows mixed urogenital ellen so is inconclusive. Is she feeling better on antibiotics? If so, recommend she complete the course. Thanks.

## 2017-09-27 ENCOUNTER — TELEPHONE (OUTPATIENT)
Dept: PRIMARY CARE CLINIC | Age: 57
End: 2017-09-27

## 2017-09-27 NOTE — TELEPHONE ENCOUNTER
Patient returned call from earlier and was advised per Milady Gaston NP of her urine results. She states she is feeling better and has 1 day left of medication.   Grupo Liang LPN

## 2017-09-27 NOTE — TELEPHONE ENCOUNTER
Left detailed voicemail of mobile number listed per Steve Villagomez NP to have patient call the office for test results.   Maeve Hogan LPN

## 2017-11-02 ENCOUNTER — OFFICE VISIT (OUTPATIENT)
Dept: PRIMARY CARE CLINIC | Age: 57
End: 2017-11-02

## 2017-11-02 VITALS
HEIGHT: 66 IN | WEIGHT: 127.8 LBS | SYSTOLIC BLOOD PRESSURE: 120 MMHG | OXYGEN SATURATION: 98 % | DIASTOLIC BLOOD PRESSURE: 88 MMHG | HEART RATE: 92 BPM | BODY MASS INDEX: 20.54 KG/M2 | TEMPERATURE: 98.8 F | RESPIRATION RATE: 17 BRPM

## 2017-11-02 DIAGNOSIS — J02.0 STREP PHARYNGITIS: ICD-10-CM

## 2017-11-02 DIAGNOSIS — J01.00 ACUTE MAXILLARY SINUSITIS, RECURRENCE NOT SPECIFIED: ICD-10-CM

## 2017-11-02 DIAGNOSIS — R68.89 FLU-LIKE SYMPTOMS: Primary | ICD-10-CM

## 2017-11-02 LAB
QUICKVUE INFLUENZA TEST: NEGATIVE
S PYO AG THROAT QL: POSITIVE
VALID INTERNAL CONTROL?: YES
VALID INTERNAL CONTROL?: YES

## 2017-11-02 RX ORDER — CLINDAMYCIN HYDROCHLORIDE 300 MG/1
300 CAPSULE ORAL 3 TIMES DAILY
Qty: 30 CAP | Refills: 0 | Status: SHIPPED | OUTPATIENT
Start: 2017-11-02 | End: 2017-11-12

## 2017-11-02 RX ORDER — LEVOFLOXACIN 500 MG/1
500 TABLET, FILM COATED ORAL DAILY
Qty: 7 TAB | Refills: 0 | Status: SHIPPED | OUTPATIENT
Start: 2017-11-02 | End: 2017-11-09

## 2017-11-02 NOTE — PROGRESS NOTES
Chief Complaint   Patient presents with    Cold Symptoms     c/o sore throat, sinus pressure and body aches x 1 week,      This note will not be viewable in MyChart.

## 2017-11-02 NOTE — PATIENT INSTRUCTIONS
Sinusitis: Care Instructions  Your Care Instructions    Sinusitis is an infection of the lining of the sinus cavities in your head. Sinusitis often follows a cold. It causes pain and pressure in your head and face. In most cases, sinusitis gets better on its own in 1 to 2 weeks. But some mild symptoms may last for several weeks. Sometimes antibiotics are needed. Follow-up care is a key part of your treatment and safety. Be sure to make and go to all appointments, and call your doctor if you are having problems. It's also a good idea to know your test results and keep a list of the medicines you take. How can you care for yourself at home? · Take an over-the-counter pain medicine, such as acetaminophen (Tylenol), ibuprofen (Advil, Motrin), or naproxen (Aleve). Read and follow all instructions on the label. · If the doctor prescribed antibiotics, take them as directed. Do not stop taking them just because you feel better. You need to take the full course of antibiotics. · Be careful when taking over-the-counter cold or flu medicines and Tylenol at the same time. Many of these medicines have acetaminophen, which is Tylenol. Read the labels to make sure that you are not taking more than the recommended dose. Too much acetaminophen (Tylenol) can be harmful. · Breathe warm, moist air from a steamy shower, a hot bath, or a sink filled with hot water. Avoid cold, dry air. Using a humidifier in your home may help. Follow the directions for cleaning the machine. · Use saline (saltwater) nasal washes to help keep your nasal passages open and wash out mucus and bacteria. You can buy saline nose drops at a grocery store or drugstore. Or you can make your own at home by adding 1 teaspoon of salt and 1 teaspoon of baking soda to 2 cups of distilled water. If you make your own, fill a bulb syringe with the solution, insert the tip into your nostril, and squeeze gently. Kelsie Pickup your nose.   · Put a hot, wet towel or a warm gel pack on your face 3 or 4 times a day for 5 to 10 minutes each time. · Try a decongestant nasal spray like oxymetazoline (Afrin). Do not use it for more than 3 days in a row. Using it for more than 3 days can make your congestion worse. When should you call for help? Call your doctor now or seek immediate medical care if:  ? · You have new or worse swelling or redness in your face or around your eyes. ? · You have a new or higher fever. ? Watch closely for changes in your health, and be sure to contact your doctor if:  ? · You have new or worse facial pain. ? · The mucus from your nose becomes thicker (like pus) or has new blood in it. ? · You are not getting better as expected. Where can you learn more? Go to http://geraldo-samuel.info/. Enter J745 in the search box to learn more about \"Sinusitis: Care Instructions. \"  Current as of: May 12, 2017  Content Version: 11.4  © 9654-2649 [employer]. Care instructions adapted under license by [employer] (which disclaims liability or warranty for this information). If you have questions about a medical condition or this instruction, always ask your healthcare professional. [place] [address] any warranty or liability for your use of this information. Saline Nasal Washes: Care Instructions  Your Care Instructions  Saline nasal washes help keep the nasal passages open by washing out thick or dried mucus. This simple remedy can help relieve symptoms of allergies, sinusitis, and colds. It also can make the nose feel more comfortable by keeping the mucous membranes moist. You may notice a little burning sensation in your nose the first few times you use the solution, but this usually gets better in a few days. Follow-up care is a key part of your treatment and safety. Be sure to make and go to all appointments, and call your doctor if you are having problems.  It's also a good idea to know your test results and keep a list of the medicines you take. How can you care for yourself at home? · You can buy premixed saline solution in a squeeze bottle or other sinus rinse products at a drugstore. Read and follow the instructions on the label. · You also can make your own saline solution by adding 1 teaspoon of salt and 1 teaspoon of baking soda to 2 cups of distilled water. · If you use a homemade solution, pour a small amount into a clean bowl. Using a rubber bulb syringe, squeeze the syringe and place the tip in the salt water. Pull a small amount of the salt water into the syringe by relaxing your hand. · Sit down with your head tilted slightly back. Do not lie down. Put the tip of the bulb syringe or the squeeze bottle a little way into one of your nostrils. Gently drip or squirt a few drops into the nostril. Repeat with the other nostril. Some sneezing and gagging are normal at first.  · Gently blow your nose. · Wipe the syringe or bottle tip clean after each use. · Repeat this 2 or 3 times a day. · Use nasal washes gently if you have nosebleeds often. When should you call for help? Watch closely for changes in your health, and be sure to contact your doctor if:  ? · You often get nosebleeds. ? · You have problems doing the nasal washes. Where can you learn more? Go to http://geraldo-samuel.info/. Enter [id number] in the search box to learn more about \"Saline Nasal Washes: Care Instructions. \"  Current as of: May 12, 2017  Content Version: 11.4  © 5884-1193 [employer]. Care instructions adapted under license by [employer] (which disclaims liability or warranty for this information). If you have questions about a medical condition or this instruction, always ask your healthcare professional. [address] any warranty or liability for your use of this information.        Strep Throat: Care Instructions  Your Care Instructions    Strep throat is a bacterial infection that causes sudden, severe sore throat and fever. Strep throat, which is caused by bacteria called streptococcus, is treated with antibiotics. Sometimes a strep test is necessary to tell if the sore throat is caused by strep bacteria. Treatment can help ease symptoms and may prevent future problems. Follow-up care is a key part of your treatment and safety. Be sure to make and go to all appointments, and call your doctor if you are having problems. It's also a good idea to know your test results and keep a list of the medicines you take. How can you care for yourself at home? · Take your antibiotics as directed. Do not stop taking them just because you feel better. You need to take the full course of antibiotics. · Strep throat can spread to others until 24 hours after you begin taking antibiotics. During this time, you should avoid contact with other people at work or home, especially infants and children. Do not sneeze or cough on others, and wash your hands often. Keep your drinking glass and eating utensils separate from those of others, and wash these items well in hot, soapy water. · Gargle with warm salt water at least once each hour to help reduce swelling and make your throat feel better. Use 1 teaspoon of salt mixed in 8 fluid ounces of warm water. · Take an over-the-counter pain medication, such as acetaminophen (Tylenol), ibuprofen (Advil, Motrin), or naproxen (Aleve). Read and follow all instructions on the label. · Try an over-the-counter anesthetic throat spray or throat lozenges, which may help relieve throat pain. · Drink plenty of fluids. Fluids may help soothe an irritated throat. Hot fluids, such as tea or soup, may help your throat feel better. · Eat soft solids and drink plenty of clear liquids. Flavored ice pops, ice cream, scrambled eggs, sherbet, and gelatin dessert (such as Jell-O) may also soothe the throat.   · Get lots of rest.  · Do not smoke, and avoid secondhand smoke. If you need help quitting, talk to your doctor about stop-smoking programs and medicines. These can increase your chances of quitting for good. · Use a vaporizer or humidifier to add moisture to the air in your bedroom. Follow the directions for cleaning the machine. When should you call for help? Call your doctor now or seek immediate medical care if:  ? · You have a new or higher fever. ? · You have a fever with a stiff neck or severe headache. ? · You have new or worse trouble swallowing. ? · Your sore throat gets much worse on one side. ? · Your pain becomes much worse on one side of your throat. ? Watch closely for changes in your health, and be sure to contact your doctor if:  ? · You are not getting better after 2 days (48 hours). ? · You do not get better as expected. Where can you learn more? Go to http://geraldo-samuel.info/. Enter K625 in the search box to learn more about \"Strep Throat: Care Instructions. \"  Current as of: May 12, 2017  Content Version: 11.4  © 8897-6329 [employer]. Care instructions adapted under license by [employer] (which disclaims liability or warranty for this information). If you have questions about a medical condition or this instruction, always ask your healthcare professional. [address] any warranty or liability for your use of this information.

## 2017-11-02 NOTE — PROGRESS NOTES
Subjective:      Verena Mota is a 61 y.o. female who presents for evaluation of possible sinus infection. Sore throat, right ear pain, sinus pressure, nasal congestion. Also complains of body aches but no chills or fever. Started a week ago, symptoms currently worsening. 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 She has tried decongestants, sinus meds, benadryl. Past Medical History:   Diagnosis Date    Anxiety 10/25/2012    Fatigue     Headache     HTN (hypertension) 10/25/2012    Insomnia 10/25/2012    Lumbar back pain 10/25/2012    Steroid injections,  Dr. Lori Espinal RA (rheumatoid arthritis) ([email or web address].) 10/25/2012    Skipped beats      Family History   Problem Relation Age of Onset    Hypertension Mother     Asthma Mother     Arthritis-rheumatoid Mother     Asthma Maternal Aunt     Diabetes Maternal Grandfather     Asthma Other     Other Other      scleroderma    Cancer Other     Migraines Other     Stroke Other      Current Outpatient Prescriptions   Medication Sig Dispense Refill    citalopram (CELEXA) 20 mg tablet       RELPAX 40 mg tablet TAKE ONE TABLET BY MOUTH AT ONSET OF HEADACHE. MAY REPEAT IN 2 HOURS IF NEEDED. **DO NOT TAKE MORE THAN 2 TABELTS PER DAY** 9 Tab 5    losartan (COZAAR) 50 mg tablet TAKE ONE TABLET BY MOUTH DAILY 90 Tab 0    albuterol (PROVENTIL VENTOLIN) 2.5 mg /3 mL (0.083 %) nebulizer solution 3 mL by Nebulization route every four (4) hours as needed for Wheezing. 1 Package 4    fluticasone (FLOVENT HFA) 110 mcg/actuation inhaler Take 2 Puffs by inhalation two (2) times a day. 1 Inhaler 5    ascorbic acid (VITAMIN C) 500 mg tablet Take 1,000 mg by mouth daily.  cyanocobalamin (VITAMIN B-12) 1,000 mcg tablet Take 1,000 mcg by mouth daily.  EPINEPHrine (EPIPEN) 0.3 mg/0.3 mL injection       leflunomide (ARAVA) 10 mg tablet       INFLIXIMAB (REMICADE IV) by IntraVENous route every six (6) weeks.       azelastine-fluticasone (DYMISTA) 137-50 mcg/spray spry by Nasal route.  meclizine (ANTIVERT) 25 mg tablet Take  by mouth three (3) times daily as needed.  ORENCIA CLICKJECT 782 mg/mL atIn       ORENCIA 125 mg/mL syrg       MOVANTIK 25 mg tab tablet       sucralfate (CARAFATE) 100 mg/mL suspension Take 10 mL by mouth four (4) times daily. 1000 mL 1    EPINEPHrine (EPIPEN JR) 0.15 mg/0.3 mL (1:2,000) injection 0.15 mg by IntraMUSCular route once as needed.  VENTOLIN HFA 90 mcg/actuation inhaler INHALE TWO PUFFS BY MOUTH EVERY 4 HOURS AS NEEDED 1 Inhaler 4    cyclobenzaprine (FLEXERIL) 10 mg tablet Take 1 Tab by mouth three (3) times daily as needed. 90 Tab 1    cholecalciferol, vitamin D3, (VITAMIN D3) 2,000 unit Tab Take 2 tablets by mouth daily.  B.infantis-B.ani-B.long-B.bifi (PROBIOTIC 4X) 10-15 mg Tab Take 2 tablets by mouth daily. Allergies   Allergen Reactions    Azithromycin Rash    Carafate [Sucralfate] Swelling    Cephalexin Hcl Rash    Enbrel [Etanercept] Other (comments)     Blisters at injection sites    Penicillin G Rash    Protonix [Pantoprazole] Swelling    Tamiflu [Oseltamivir Phosphate] Swelling     Social History     Social History    Marital status:      Spouse name: N/A    Number of children: N/A    Years of education: N/A     Occupational History    Not on file. Social History Main Topics    Smoking status: Never Smoker    Smokeless tobacco: Never Used    Alcohol use Yes      Comment: socially    Drug use: No    Sexual activity: No     Other Topics Concern    Not on file     Social History Narrative       Review of Systems   Constitutional: Negative for chills and fever. HENT: Positive for congestion, ear pain, sinus pain and sore throat. Eyes: Negative for blurred vision, double vision, photophobia and pain. Respiratory: Negative for cough, shortness of breath and stridor. Cardiovascular: Negative for chest pain.    Gastrointestinal: Negative for abdominal pain, diarrhea, nausea and vomiting. Skin: Negative for rash. Neurological: Negative for tingling, sensory change, focal weakness and headaches. Objective:     Visit Vitals    /88 (BP 1 Location: Left arm, BP Patient Position: Sitting)    Pulse 92    Temp 98.8 °F (37.1 °C) (Oral)    Resp 17    Ht 5' 6\" (1.676 m)    Wt 127 lb 12.8 oz (58 kg)    SpO2 98%    BMI 20.63 kg/m2     General: Alert and oriented and in no acute distress. Responds to all questions appropriately. SKIN: No rash. HEAD: bilaterally maxillary sinus tenderness. EYES: Sclera and conjunctiva clear; pupils round and reactive to light. EARS: External normal, canals clear, tympanic membranes normal.     NOSE: Edema and erythema of the turbinates with yellow mucous drainage. OROPHARYNX: tonsil edema and erythema with no exudate. NECK: Supple; no masses; normal lymphadenopathy. LUNGS: Clear to auscultation bilaterally, no wheeze, rales and rhonchi. CARDIOVASCULAR: Regular, rate, and rhythm without murmurs, gallops or rubs. NEUROLOGIC: Speech intact; face symmetrical; moves all extremities equally. Assessment:       ICD-10-CM ICD-9-CM    1. Flu-like symptoms R68.89 780.99 AMB POC RAPID STREP A      AMB POC RAPID INFLUENZA TEST   2. Acute maxillary sinusitis, recurrence not specified J01.00 461.0 levoFLOXacin (LEVAQUIN) 500 mg tablet   3. Strep pharyngitis J02.0 034.0 clindamycin (CLEOCIN) 300 mg capsule     Strep + sinusitis with extensive allergy list. Start clinda to cover strep and levofloxacin to treat sinusitis given worsening of symptoms. Return PRN. 1. Nasal saline rinses as needed for congestion. 2. Follow-up  in 10 days  if symptoms worsen or persist.  3. Over-the-counter mediciations:    1. Ibuprofen (Motrin, Advil): 200mg  take 1-4 three times a day for 5 days. -OR-    Naproxin (Aleve): 220mg 1-2 tablets twice a day for 5 days.   2. Acetaminophen (Tylenol): 500mg 1-2 tablets every 6 hours as needed for pain. 3. Combination cough and decongestant medicine such as Mucinex D.  4. Sudafed    This note will not be viewable in MyChart.

## 2017-11-02 NOTE — MR AVS SNAPSHOT
Visit Information Date & Time Provider Department Dept. Phone Encounter #  
 11/2/2017 10:30  [address], [address] [address] [phone] [id number] Your Appointments 11/16/2017  2:00 PM  
Follow Up with Ciro Thomas MD  
[facility] Oncology at [facility] Appt Note: 2 Week Follow Up; 2 Week Follow Up; 2 Week Follow Up  
 [address] [place] [address] [id number]  
[phone]  
  
   
 [address] [address] Upcoming Health Maintenance Date Due DTaP/Tdap/Td series (1 - Tdap) 9/7/1980 Pneumococcal 19-64 Highest Risk (2 of 3 - PCV13) 11/21/2014 PAP AKA CERVICAL CYTOLOGY 1/1/2016 BREAST CANCER SCRN MAMMOGRAM 12/16/2016 COLONOSCOPY 1/1/2021 Allergies as of 11/2/2017  Review Complete On: 11/2/2017 By: 279 Yoselyn Romero MD  
  
 Severity Noted Reaction Type Reactions Azithromycin  10/25/2012   Side Effect Rash Carafate [Sucralfate]  07/07/2016    Swelling Cephalexin Hcl  10/25/2012   Side Effect Rash Enbrel [Etanercept]  10/20/2015    Other (comments) Blisters at injection sites Penicillin G  10/25/2012    Rash Protonix [Pantoprazole]  07/07/2016    Swelling Tamiflu [Oseltamivir Phosphate]  12/29/2014   Systemic Swelling Current Immunizations  Reviewed on 11/21/2014 Name Date Influenza High Dose Vaccine PF 10/15/2014 Influenza Vaccine Whole 10/1/2012 Pneumococcal Vaccine (Unspecified Type) 11/21/2013 Not reviewed this visit You Were Diagnosed With   
  
 Codes Comments Flu-like symptoms    -  Primary ICD-10-CM: R68.89 ICD-9-CM: 780.99 Acute maxillary sinusitis, recurrence not specified     ICD-10-CM: J01.00 ICD-9-CM: 461.0 Strep pharyngitis     ICD-10-CM: J02.0 ICD-9-CM: 034.0 Vitals BP Pulse Temp Resp Height(growth percentile) Weight(growth percentile)  120/88 (BP 1 Location: Left arm, BP Patient Position: Sitting) 92 98.8 °F (37.1 °C) (Oral) 17 5' 6\" (1.676 m) 127 lb 12.8 oz (58 kg) SpO2 BMI OB Status Smoking Status 98% 20.63 kg/m2 Postmenopausal Never Smoker BMI and BSA Data Body Mass Index Body Surface Area  
 20.63 kg/m 2 1.64 m 2 Preferred Pharmacy Pharmacy Name Phone [facility], [address] [place] [address] Li Dubois Said [phone] Your Updated Medication List  
  
   
This list is accurate as of: 11/2/17 11:06 AM.  Always use your most recent med list.  
  
  
  
  
 citalopram 20 mg tablet Commonly known as:  CELEXA  
  
 clindamycin 300 mg capsule Commonly known as:  CLEOCIN Take 1 Cap by mouth three (3) times daily for 10 days. cyclobenzaprine 10 mg tablet Commonly known as:  FLEXERIL Take 1 Tab by mouth three (3) times daily as needed. DYMISTA 137-50 mcg/spray [place] Generic drug:  azelastine-fluticasone  
by Nasal route. * EPINEPHrine 0.15 mg/0.3 mL injection Commonly known as:  EPIPEN JR  
0.15 mg by IntraMUSCular route once as needed. * EPINEPHrine 0.3 mg/0.3 mL injection Commonly known as:  EPIPEN  
  
 fluticasone 110 mcg/actuation inhaler Commonly known as:  FLOVENT HFA Take 2 Puffs by inhalation two (2) times a day. leflunomide 10 mg tablet Commonly known as:  ARAVA  
  
 levoFLOXacin 500 mg tablet Commonly known as:  Threasa Mince Take 1 Tab by mouth daily for 7 days. losartan 50 mg tablet Commonly known as:  COZAAR  
TAKE ONE TABLET BY MOUTH DAILY  
  
 meclizine 25 mg tablet Commonly known as:  ANTIVERT Take  by mouth three (3) times daily as needed. MOVANTIK 25 mg Tab tablet Generic drug:  naloxegol * ORENCIA 125 mg/mL Syrg Generic drug:  abatacept * ORENCIA CLICKJECT 909 mg/mL [place] Generic drug:  abatacept PROBIOTIC 4X 10-15 mg Tbec Generic drug:  B.infantis-B.ani-B.long-B.bifi Take 2 tablets by mouth daily. RELPAX 40 mg tablet Generic drug:  eletriptan TAKE ONE TABLET BY MOUTH AT ONSET OF HEADACHE. MAY REPEAT IN 2 HOURS IF NEEDED. **DO NOT TAKE MORE THAN 2 TABELTS PER DAY**  
  
 REMICADE IV  
by IntraVENous route every six (6) weeks. sucralfate 100 mg/mL suspension Commonly known as:  Zoila Shelter Take 10 mL by mouth four (4) times daily. * VENTOLIN HFA 90 mcg/actuation inhaler Generic drug:  albuterol INHALE TWO PUFFS BY MOUTH EVERY 4 HOURS AS NEEDED  
  
 * albuterol 2.5 mg /3 mL (0.083 %) nebulizer solution Commonly known as:  PROVENTIL VENTOLIN  
3 mL by Nebulization route every four (4) hours as needed for Wheezing. VITAMIN B-12 1,000 mcg tablet Generic drug:  cyanocobalamin Take 1,000 mcg by mouth daily. VITAMIN C 500 mg tablet Generic drug:  ascorbic acid (vitamin C) Take 1,000 mg by mouth daily. VITAMIN D3 2,000 unit Tab Generic drug:  cholecalciferol (vitamin D3) Take 2 tablets by mouth daily. * Notice: This list has 6 medication(s) that are the same as other medications prescribed for you. Read the directions carefully, and ask your doctor or other care provider to review them with you. Prescriptions Sent to Pharmacy Refills  
 clindamycin (CLEOCIN) 300 mg capsule 0 Sig: Take 1 Cap by mouth three (3) times daily for 10 days. Class: Normal  
 Pharmacy: [facility]  at [address] Ph #: [phone] Route: Oral  
 levoFLOXacin (LEVAQUIN) 500 mg tablet 0 Sig: Take 1 Tab by mouth daily for 7 days. Class: Normal  
 Pharmacy: [facility]  at [address] Ph #: [phone] Route: Oral  
  
We Performed the Following AMB POC RAPID INFLUENZA TEST [06647 CPT(R)] AMB POC RAPID STREP A [88730 CPT(R)] Patient Instructions Sinusitis: Care Instructions Your Care Instructions Sinusitis is an infection of the lining of the sinus cavities in your head. Sinusitis often follows a cold. It causes pain and pressure in your head and face. In most cases, sinusitis gets better on its own in 1 to 2 weeks. But some mild symptoms may last for several weeks. Sometimes antibiotics are needed. Follow-up care is a key part of your treatment and safety. Be sure to make and go to all appointments, and call your doctor if you are having problems. It's also a good idea to know your test results and keep a list of the medicines you take. How can you care for yourself at home? · Take an over-the-counter pain medicine, such as acetaminophen (Tylenol), ibuprofen (Advil, Motrin), or naproxen (Aleve). Read and follow all instructions on the label. · If the doctor prescribed antibiotics, take them as directed. Do not stop taking them just because you feel better. You need to take the full course of antibiotics. · Be careful when taking over-the-counter cold or flu medicines and Tylenol at the same time. Many of these medicines have acetaminophen, which is Tylenol. Read the labels to make sure that you are not taking more than the recommended dose. Too much acetaminophen (Tylenol) can be harmful. · Breathe warm, moist air from a steamy shower, a hot bath, or a sink filled with hot water. Avoid cold, dry air. Using a humidifier in your home may help. Follow the directions for cleaning the machine. · Use saline (saltwater) nasal washes to help keep your nasal passages open and wash out mucus and bacteria. You can buy saline nose drops at a grocery store or drugstore. Or you can make your own at home by adding 1 teaspoon of salt and 1 teaspoon of baking soda to 2 cups of distilled water. If you make your own, fill a bulb syringe with the solution, insert the tip into your nostril, and squeeze gently. Ruslan Bees your nose.  
· Put a hot, wet towel or a warm gel pack on your face 3 or 4 times a day for 5 to 10 minutes each time. · Try a decongestant nasal spray like oxymetazoline (Afrin). Do not use it for more than 3 days in a row. Using it for more than 3 days can make your congestion worse. When should you call for help? Call your doctor now or seek immediate medical care if: 
? · You have new or worse swelling or redness in your face or around your eyes. ? · You have a new or higher fever. ? Watch closely for changes in your health, and be sure to contact your doctor if: 
? · You have new or worse facial pain. ? · The mucus from your nose becomes thicker (like pus) or has new blood in it. ? · You are not getting better as expected. Where can you learn more? Go to http://geraldo-samuel.info/. Enter B297 in the search box to learn more about \"Sinusitis: Care Instructions. \" Current as of: May 12, 2017 Content Version: 11.4 © 0626-2782 SwingShot. Care instructions adapted under license by Xmybox (which disclaims liability or warranty for this information). If you have questions about a medical condition or this instruction, always ask your healthcare professional. [place] [address] any warranty or liability for your use of this information. Saline Nasal Washes: Care Instructions Your Care Instructions Saline nasal washes help keep the nasal passages open by washing out thick or dried mucus. This simple remedy can help relieve symptoms of allergies, sinusitis, and colds. It also can make the nose feel more comfortable by keeping the mucous membranes moist. You may notice a little burning sensation in your nose the first few times you use the solution, but this usually gets better in a few days. Follow-up care is a key part of your treatment and safety. Be sure to make and go to all appointments, and call your doctor if you are having problems.  It's also a good idea to know your test results and keep a list of the medicines you take. How can you care for yourself at home? · You can buy premixed saline solution in a squeeze bottle or other sinus rinse products at a drugstore. Read and follow the instructions on the label. · You also can make your own saline solution by adding 1 teaspoon of salt and 1 teaspoon of baking soda to 2 cups of distilled water. · If you use a homemade solution, pour a small amount into a clean bowl. Using a rubber bulb syringe, squeeze the syringe and place the tip in the salt water. Pull a small amount of the salt water into the syringe by relaxing your hand. · Sit down with your head tilted slightly back. Do not lie down. Put the tip of the bulb syringe or the squeeze bottle a little way into one of your nostrils. Gently drip or squirt a few drops into the nostril. Repeat with the other nostril. Some sneezing and gagging are normal at first. 
· Gently blow your nose. · Wipe the syringe or bottle tip clean after each use. · Repeat this 2 or 3 times a day. · Use nasal washes gently if you have nosebleeds often. When should you call for help? Watch closely for changes in your health, and be sure to contact your doctor if: 
? · You often get nosebleeds. ? · You have problems doing the nasal washes. Where can you learn more? Go to http://geraldo-samuel.info/. Enter [id number] [id number] in the search box to learn more about \"Saline Nasal Washes: Care Instructions. \" Current as of: May 12, 2017 Content Version: 11.4 © 8294-0658 PeerSpace. Care instructions adapted under license by [employer] (which disclaims liability or warranty for this information). If you have questions about a medical condition or this instruction, always ask your healthcare professional. [address] any warranty or liability for your use of this information. Strep Throat: Care Instructions Your Care Instructions Strep throat is a bacterial infection that causes sudden, severe sore throat and fever. Strep throat, which is caused by bacteria called streptococcus, is treated with antibiotics. Sometimes a strep test is necessary to tell if the sore throat is caused by strep bacteria. Treatment can help ease symptoms and may prevent future problems. Follow-up care is a key part of your treatment and safety. Be sure to make and go to all appointments, and call your doctor if you are having problems. It's also a good idea to know your test results and keep a list of the medicines you take. How can you care for yourself at home? · Take your antibiotics as directed. Do not stop taking them just because you feel better. You need to take the full course of antibiotics. · Strep throat can spread to others until 24 hours after you begin taking antibiotics. During this time, you should avoid contact with other people at work or home, especially infants and children. Do not sneeze or cough on others, and wash your hands often. Keep your drinking glass and eating utensils separate from those of others, and wash these items well in hot, soapy water. · Gargle with warm salt water at least once each hour to help reduce swelling and make your throat feel better. Use 1 teaspoon of salt mixed in 8 fluid ounces of warm water. · Take an over-the-counter pain medication, such as acetaminophen (Tylenol), ibuprofen (Advil, Motrin), or naproxen (Aleve). Read and follow all instructions on the label. · Try an over-the-counter anesthetic throat spray or throat lozenges, which may help relieve throat pain. · Drink plenty of fluids. Fluids may help soothe an irritated throat. Hot fluids, such as tea or soup, may help your throat feel better. · Eat soft solids and drink plenty of clear liquids. Flavored ice pops, ice cream, scrambled eggs, sherbet, and gelatin dessert (such as Jell-O) may also soothe the throat.  
· Get lots of rest. 
 · Do not smoke, and avoid secondhand smoke. If you need help quitting, talk to your doctor about stop-smoking programs and medicines. These can increase your chances of quitting for good. · Use a vaporizer or humidifier to add moisture to the air in your bedroom. Follow the directions for cleaning the machine. When should you call for help? Call your doctor now or seek immediate medical care if: 
? · You have a new or higher fever. ? · You have a fever with a stiff neck or severe headache. ? · You have new or worse trouble swallowing. ? · Your sore throat gets much worse on one side. ? · Your pain becomes much worse on one side of your throat. ? Watch closely for changes in your health, and be sure to contact your doctor if: 
? · You are not getting better after 2 days (48 hours). ? · You do not get better as expected. Where can you learn more? Go to http://geraldo-samuel.info/. Enter K625 in the search box to learn more about \"Strep Throat: Care Instructions. \" Current as of: May 12, 2017 Content Version: 11.4 © 0069-4554 Contact Solutions. Care instructions adapted under license by Health Data Minder (which disclaims liability or warranty for this information). If you have questions about a medical condition or this instruction, always ask your healthcare professional. [place] [address] any warranty or liability for your use of this information. Introducing [address]! Dear Junito Pedro: Thank you for requesting a SpineForm account. Our records indicate that you already have an active SpineForm account. You can access your account anytime at [email or web address] Did you know that you can access your hospital and ER discharge instructions at any time in SpineForm? You can also review all of your test results from your hospital stay or ER visit. Additional Information If you have questions, please visit the Frequently Asked Questions section of the [employer] Therapeutics[employer] website at [email or web address] Remember, [employer]deye is NOT to be used for urgent needs. For medical emergencies, dial 911. Now available from your iPhone and Android! Please provide this summary of care documentation to your next provider. Your primary care clinician is listed as [facility]. If you have any questions after today's visit, please call [phone].

## 2017-11-16 ENCOUNTER — OFFICE VISIT (OUTPATIENT)
Dept: ONCOLOGY | Age: 57
End: 2017-11-16

## 2017-11-16 VITALS
SYSTOLIC BLOOD PRESSURE: 117 MMHG | OXYGEN SATURATION: 99 % | TEMPERATURE: 97.1 F | BODY MASS INDEX: 20.73 KG/M2 | HEART RATE: 74 BPM | HEIGHT: 66 IN | DIASTOLIC BLOOD PRESSURE: 72 MMHG | WEIGHT: 129 LBS | RESPIRATION RATE: 16 BRPM

## 2017-11-16 DIAGNOSIS — N39.0 URINARY TRACT INFECTION WITHOUT HEMATURIA, SITE UNSPECIFIED: ICD-10-CM

## 2017-11-16 DIAGNOSIS — D61.818 PANCYTOPENIA (HCC): Primary | ICD-10-CM

## 2017-11-16 DIAGNOSIS — M06.9 RHEUMATOID ARTHRITIS, INVOLVING UNSPECIFIED SITE, UNSPECIFIED RHEUMATOID FACTOR PRESENCE: ICD-10-CM

## 2017-11-16 NOTE — PROGRESS NOTES
Hematology progress note    REASON FOR VISIT: Leucopenia  REQUESTED BY: Dr. Lalito Ruiz: Ms. Francisco Javier Isaacs is a 61 y.o. female with anxiety, hypertension, insomnia, history of gluten sensitivity, asthma, migraines, hyperlipidemia, nonmelanoma skin cancer, rheumatoid arthritis  who presents for follow up of pancytopenia. She has previously received treatments with Arava and Humira followed by Acterma without much benefit. She was then changed to CellCept and Plaquenil. This was followed by Shelby Subramanian but she then developed a flare of uveitis at which point she was changed to Remicade about 4-5 months ago. Her most recent absolute neutrophil count was 500. She has had leukopenia for several months now with an [address] of 1100 on 4/28/17, 600 on 6/26/17. During this time she has had a relatively stable hemoglobin between 11-12 g/dL and normal platelet counts. When I repeated her CBC on 9/12/17 her CBC was completely normal.  Hepatitis B, hepatitis C, HIV, gammopathy evaluation and B12 evaluation were unremarkable. Hence her cytopenias were attributed to her DMARDs. She now returns for follow-up. Overall her energy is better. However she complains of frequent  Urinary tract infections. As regards her arthritis, she is now on Remicade. She has not noted any difference in her symptoms as. Denies any fevers in the last 1 week, no chills, no abnormal bleeding, no new lumps or bumps, no changes in bowel or bladder habits. FH of anemia, generations have had rheumatoid arthritis. Never smokes, occasional ETOH.        Past Medical History:   Diagnosis Date    Anxiety 10/25/2012    Fatigue     Headache     HTN (hypertension) 10/25/2012    Insomnia 10/25/2012    Lumbar back pain 10/25/2012    Steroid injections,  Dr. Isamar Sen      [facility] 10/25/2012    Skipped beats        Past Surgical History:   Procedure Laterality Date    HX BREAST REDUCTION      HX  SECTION      2    HX HEENT      deviated septum    HX ORTHOPAEDIC      R metatarsal joint fusion    NEUROLOGICAL PROCEDURE UNLISTED      lumbar radio frequency neurotomy       Allergies   Allergen Reactions    Azithromycin Rash    Carafate [Sucralfate] Swelling    Cephalexin Hcl Rash    Enbrel [Etanercept] Other (comments)     Blisters at injection sites    Penicillin G Rash    Protonix [Pantoprazole] Swelling    Tamiflu [Oseltamivir Phosphate] Swelling       Current Outpatient Prescriptions   Medication Sig Dispense Refill    citalopram (CELEXA) 20 mg tablet       EPINEPHrine (EPIPEN) 0.3 mg/0.3 mL injection       INFLIXIMAB (REMICADE IV) by IntraVENous route every six (6) weeks.  RELPAX 40 mg tablet TAKE ONE TABLET BY MOUTH AT ONSET OF HEADACHE. MAY REPEAT IN 2 HOURS IF NEEDED. **DO NOT TAKE MORE THAN 2 TABELTS PER DAY** 9 Tab 5    azelastine-fluticasone (DYMISTA) 137-50 mcg/spray spry by Nasal route.  MOVANTIK 25 mg tab tablet       losartan (COZAAR) 50 mg tablet TAKE ONE TABLET BY MOUTH DAILY 90 Tab 0    EPINEPHrine (EPIPEN JR) 0.15 mg/0.3 mL (1:2,000) injection 0.15 mg by IntraMUSCular route once as needed.  albuterol (PROVENTIL VENTOLIN) 2.5 mg /3 mL (0.083 %) nebulizer solution 3 mL by Nebulization route every four (4) hours as needed for Wheezing. 1 Package 4    VENTOLIN HFA 90 mcg/actuation inhaler INHALE TWO PUFFS BY MOUTH EVERY 4 HOURS AS NEEDED 1 Inhaler 4    cyclobenzaprine (FLEXERIL) 10 mg tablet Take 1 Tab by mouth three (3) times daily as needed. 90 Tab 1    fluticasone (FLOVENT HFA) 110 mcg/actuation inhaler Take 2 Puffs by inhalation two (2) times a day. 1 Inhaler 5    ascorbic acid (VITAMIN C) 500 mg tablet Take 1,000 mg by mouth daily.  cholecalciferol, vitamin D3, (VITAMIN D3) 2,000 unit Tab Take 2 tablets by mouth daily.  cyanocobalamin (VITAMIN B-12) 1,000 mcg tablet Take 1,000 mcg by mouth daily.         B.infantis-B.ani-B.long-B.bifi (PROBIOTIC 4X) 10-15 mg Tab Take 2 tablets by mouth daily.  leflunomide (ARAVA) 10 mg tablet       meclizine (ANTIVERT) 25 mg tablet Take  by mouth three (3) times daily as needed.  ORENCIA CLICKJECT 189 mg/mL atIn       ORENCIA 125 mg/mL syrg       sucralfate (CARAFATE) 100 mg/mL suspension Take 10 mL by mouth four (4) times daily. 1000 mL 1       Social History     Social History    Marital status:      Spouse name: N/A    Number of children: N/A    Years of education: N/A     Social History Main Topics    Smoking status: Never Smoker    Smokeless tobacco: Never Used    Alcohol use Yes      Comment: socially    Drug use: No    Sexual activity: No     Other Topics Concern    None     Social History Narrative       Family History   Problem Relation Age of Onset    Hypertension Mother     Asthma Mother     Arthritis-rheumatoid Mother     Asthma Maternal Aunt     Diabetes Maternal Grandfather     Asthma Other     Other Other      scleroderma    Cancer Other     Migraines Other     Stroke Other        ROS  A 12 point review of systems was obtained and is negative except as listed in HPI.   ECOG PS is 1  Emotional well being addressed and patient is coping well    Physical Examination:   Visit Vitals    /72    Pulse 74    Temp 97.1 °F (36.2 °C)    Resp 16    Ht 5' 6\" (1.676 m)    Wt 129 lb (58.5 kg)    SpO2 99%    BMI 20.82 kg/m2     General appearance - alert, well appearing, and in no distress  Mental status - oriented to person, place, and time  Mouth - mucous membranes moist, pharynx normal without lesions  Neck - supple, no significant adenopathy  Lymphatics - no palpable lymphadenopathy, no hepatosplenomegaly  Chest - clear to auscultation, no wheezes, rales or rhonchi, symmetric air entry  Heart - normal rate, regular rhythm, normal S1, S2, no murmurs, rubs, clicks or gallops  Skin - normal coloration and turgor, no rashes, no suspicious skin lesions noted    LABS  Lab Results   Component Value Date/Time    WBC 4.2 09/12/2017 04:26 PM    HGB 12.8 09/12/2017 04:26 PM    HCT 38.6 09/12/2017 04:26 PM    PLATELET 516 51/91/5506 04:26 PM    MCV 90 09/12/2017 04:26 PM    ABS. NEUTROPHILS 2.3 09/12/2017 04:26 PM     Lab Results   Component Value Date/Time    Sodium 139 05/16/2016 02:00 PM    Potassium 4.5 05/16/2016 02:00 PM    Chloride 102 05/16/2016 02:00 PM    CO2 29 05/16/2016 02:00 PM    Glucose 103 05/16/2016 02:00 PM    BUN 15 05/16/2016 02:00 PM    Creatinine 0.88 05/16/2016 02:00 PM    GFR est AA >60 05/16/2016 02:00 PM    GFR est non-AA >60 05/16/2016 02:00 PM    Calcium 9.8 05/16/2016 02:00 PM    BUN (POC) 16 05/16/2016 02:02 PM    Calcium, ionized (POC) 1.19 05/16/2016 02:02 PM     Lab Results   Component Value Date/Time    AST (SGOT) 28 05/16/2016 02:00 PM    Alk. phosphatase 93 05/16/2016 02:00 PM    Protein, total 7.2 09/12/2017 04:26 PM    Albumin 4.0 05/16/2016 02:00 PM    Globulin 3.3 05/16/2016 02:00 PM    A-G Ratio 1.2 05/16/2016 02:00 PM     Lab Results   Component Value Date/Time    Iron % saturation 11 09/12/2017 04:26 PM    TIBC 315 09/12/2017 04:26 PM    Ferritin 38 09/12/2017 04:26 PM    Vitamin B12 841 09/12/2017 04:26 PM     09/12/2017 04:26 PM    Sed rate (ESR) 2 01/17/2013 03:30 PM    M-Noam Not Observed 09/12/2017 04:26 PM    Lipase 198 05/16/2016 02:00 PM    Hep C Virus Ab <0.1 09/12/2017 04:26 PM    HIV SCREEN 4TH GENERATION WRFX Non Reactive 09/12/2017 04:26 PM         ASSESSMENT  Ms. Kathie Corea is a 61 y.o. female with RA on several prior treatments (arava , Humira , Actemra , CellCept ,Plaquenil, Enbrel, Archana Peon ) who was seen initially for transient neutropenia which had resolved on her last CBC on 9/12/17. Her neutropenia was attributed to her DMARDS. PLAN  Neutropenia  Neutropenia resolved on 9/12/17.   Additional workup included a normal B12, unremarkable iron studies apart from a slightly decreased saturation of 11%, unremarkable smear, no evidence of hepatitis, HIV, gammopathy. Likely secondary to her RA medications    RA   Management per Dr. Elva Treviño    HTN  On Cozaar    She will be having a CBC drawn with Dr. Elva Treviño at the end of November. Was requested that she faxes this to us. If abnormal will be happy to see her again otherwise no further follow-up with hematology is required. Marcelo Garner MD    Ms. Neli Martel has a reminder for a \"due or due soon\" health maintenance. I have asked that she contact her primary care provider for follow-up on this health maintenance.

## 2018-01-08 DIAGNOSIS — G43.001 MIGRAINE WITHOUT AURA AND WITH STATUS MIGRAINOSUS, NOT INTRACTABLE: Primary | ICD-10-CM

## 2018-01-08 NOTE — TELEPHONE ENCOUNTER
Pt stated that the  reltax pack needs to be generic because of new insurance and she has to have  a 90 day supply. Best contact number is [phone].

## 2018-02-13 RX ORDER — ELETRIPTAN HYDROBROMIDE 40 MG/1
TABLET, FILM COATED ORAL
Qty: 9 TAB | Refills: 0 | Status: SHIPPED | OUTPATIENT
Start: 2018-02-13 | End: 2018-03-22 | Stop reason: SDUPTHER

## 2018-03-22 ENCOUNTER — OFFICE VISIT (OUTPATIENT)
Dept: NEUROLOGY | Age: 58
End: 2018-03-22

## 2018-03-22 VITALS
OXYGEN SATURATION: 98 % | BODY MASS INDEX: 21.69 KG/M2 | TEMPERATURE: 98.9 F | HEIGHT: 66 IN | RESPIRATION RATE: 17 BRPM | SYSTOLIC BLOOD PRESSURE: 110 MMHG | HEART RATE: 89 BPM | DIASTOLIC BLOOD PRESSURE: 70 MMHG | WEIGHT: 135 LBS

## 2018-03-22 DIAGNOSIS — G43.011 INTRACTABLE MIGRAINE WITHOUT AURA AND WITH STATUS MIGRAINOSUS: ICD-10-CM

## 2018-03-22 DIAGNOSIS — G50.0 SUPRAORBITAL NEURALGIA: Primary | ICD-10-CM

## 2018-03-22 DIAGNOSIS — G43.001 MIGRAINE WITHOUT AURA AND WITH STATUS MIGRAINOSUS, NOT INTRACTABLE: ICD-10-CM

## 2018-03-22 RX ORDER — ELETRIPTAN HYDROBROMIDE 40 MG/1
TABLET, FILM COATED ORAL
Qty: 9 TAB | Refills: 3 | Status: SHIPPED | OUTPATIENT
Start: 2018-03-22 | End: 2018-07-17 | Stop reason: SDUPTHER

## 2018-03-22 NOTE — PATIENT INSTRUCTIONS
Information Regarding Testing     If you have physican order for a test or a medication denied by your insurance company, this does not mean the test or medication is not appropriate for you as that is a medical decision, not a decision to be made by an insurance company representative or by an [facility] physician who has not interviewed and examined you. This is a decision to be made between you and your physician. The denial of services is a contractual matter between you and your insurance company, not an issue between your physician and the insurance company. If your test or medication is denied, you can take the following steps to help resolve the issue:    1. File a complaint with the [facility] regarding your insurance company's denial of services ordered for you. You can do this either by calling them directly or by completing an on-line complaint form on the Vtap. This can be found at [email or web address]    2. Also file a formal complaint with your insurance company and ask to have the name of the person denying the service so that you may explore a legal option should you be harmed by this denial of service. Again, the fact the insurance company will not pay for the service does not mean it is not medically necessary and I would encourage you to follow through with the plan that was made with your physician    3. File a written complaint with your employer so your employer and benefit manager is aware of the poor coverage they are providing their employees. If you have medicare/medicaid, complain to your representative in the House and to your Anuel Garsia.     [facility]   Statement to Patients  April 1, 2014      In an effort to ensure the large volume of patient prescription refills is processed in the most efficient and expeditious manner, we are asking our patients to assist us by calling your Pharmacy for all prescription refills, this will include also your  Mail Order Pharmacy. The pharmacy will contact our office electronically to continue the refill process. Please do not wait until the last minute to call your pharmacy. We need at least 48 hours (2days) to fill prescriptions. We also encourage you to call your pharmacy before going to  your prescription to make sure it is ready. With regard to controlled substance prescription refill requests (narcotic refills) that need to be picked up at our office, we ask your cooperation by providing us with at least 72 hours (3days) notice that you will need a refill. We will not refill narcotic prescription refill requests after 4:00pm on any weekday, Monday through Thursday, or after 2:00pm on Fridays, or on the weekends. We encourage everyone to explore another way of getting your prescription refill request processed using [employer], our patient web portal through our electronic medical record system. [employer] is an efficient and effective way to communicate your medication request directly to the office and  downloadable as an linda on your smart phone . [employer] also features a review functionality that allows you to view your medication list as well as leave messages for your physician. Are you ready to get connected? If so please review the attatched instructions or speak to any of our staff to get you set up right away! Thank you so much for your cooperation. Should you have any questions please contact our Practice Administrator. The Physicians and Staff,  [address] [place] [address] Neurology [facility]     If we have ordered testing for you, we do not call patients with results and we do not give test results over the phone. We schedule follow up appointments so that your results can be discussed in person and any questions you have regarding them may be addressed.   If something of concern is revealed on your test, we will call you for a sooner follow up appointment. Additionally, results may be found by using the My Chart feature and one of our patient service representatives at the  can give you instructions on how to access this feature of our electronic medical record system. Learning About Living Casey Neither  What is a living will? A living will is a legal form you use to write down the kind of care you want at the end of your life. It is used by the health professionals who will treat you if you aren't able to decide for yourself. If you put your wishes in writing, your loved ones and others will know what kind of care you want. They won't need to guess. This can ease your mind and be helpful to others. A living will is not the same as an estate or property will. An estate will explains what you want to happen with your money and property after you die. Is a living will a legal document? A living will is a legal document. Each state has its own laws about living teran. If you move to another state, make sure that your living will is legal in the state where you now live. Or you might use a universal form that has been approved by many states. This kind of form can sometimes be completed and stored online. Your electronic copy will then be available wherever you have a connection to the Internet. In most cases, doctors will respect your wishes even if you have a form from a different state. · You don't need an  to complete a living will. But legal advice can be helpful if your state's laws are unclear, your health history is complicated, or your family can't agree on what should be in your living will. · You can change your living will at any time. Some people find that their wishes about end-of-life care change as their health changes. · In addition to making a living will, think about completing a medical power of  form.  This form lets you name the person you want to make end-of-life treatment decisions for you (your \"health care agent\") if you're not able to. Many hospitals and nursing homes will give you the forms you need to complete a living will and a medical power of . · Your living will is used only if you can't make or communicate decisions for yourself anymore. If you become able to make decisions again, you can accept or refuse any treatment, no matter what you wrote in your living will. · Your state may offer an online registry. This is a place where you can store your living will online so the doctors and nurses who need to treat you can find it right away. What should you think about when creating a living will? Talk about your end-of-life wishes with your family members and your doctor. Let them know what you want. That way the people making decisions for you won't be surprised by your choices. Think about these questions as you make your living will:  · Do you know enough about life support methods that might be used? If not, talk to your doctor so you know what might be done if you can't breathe on your own, your heart stops, or you're unable to swallow. · What things would you still want to be able to do after you receive life-support methods? Would you want to be able to walk? To speak? To eat on your own? To live without the help of machines? · If you have a choice, where do you want to be cared for? In your home? At a hospital or nursing home? · Do you want certain Shinto practices performed if you become very ill? · If you have a choice at the end of your life, where would you prefer to die? At home? In a hospital or nursing home? Somewhere else? · Would you prefer to be buried or cremated? · Do you want your organs to be donated after you die? What should you do with your living will? · Make sure that your family members and your health care agent have copies of your living will. · Give your doctor a copy of your living will to keep in your medical record.  If you have more than one doctor, make sure that each one has a copy. · You may want to put a copy of your living will where it can be easily found. Where can you learn more? Go to http://geraldo-samuel.info/. Enter V899 in the search box to learn more about \"Learning About Living [place]. \"  Current as of: September 24, 2016  Content Version: 11.4  © 7193-4301 [employer]. Care instructions adapted under license by [employer] (which disclaims liability or warranty for this information). If you have questions about a medical condition or this instruction, always ask your healthcare professional. [address] any warranty or liability for your use of this information.

## 2018-03-22 NOTE — PROGRESS NOTES
Follow up for migraines. Reports increase in frequency of migraines. Reports also having daily headaches and taking OTC medication for headaches.

## 2018-03-22 NOTE — MR AVS SNAPSHOT
[facility] [address] [facility] [id number] [phone] Patient: Heidy Damon MRN: [id number] Visit Information Date & Time Provider Department Dept. Phone Encounter #  
 3/22/2018  3:00 PM Obed Altamirano MD [facility] Neurology [facility] [phone] [id number] Follow-up Instructions Return for After block. Upcoming Health Maintenance Date Due DTaP/Tdap/Td series (1 - Tdap) 9/7/1980 Pneumococcal 19-64 Highest Risk (2 of 3 - PCV13) 11/21/2014 PAP AKA CERVICAL CYTOLOGY 1/1/2016 BREAST CANCER SCRN MAMMOGRAM 12/16/2016 COLONOSCOPY 1/1/2021 Allergies as of 3/22/2018  Review Complete On: 3/22/2018 By: Obed Altamirano MD  
  
 Severity Noted Reaction Type Reactions Azithromycin  10/25/2012   Side Effect Rash Carafate [Sucralfate]  07/07/2016    Swelling Cephalexin Hcl  10/25/2012   Side Effect Rash Enbrel [Etanercept]  10/20/2015    Other (comments) Blisters at injection sites Penicillin G  10/25/2012    Rash Protonix [Pantoprazole]  07/07/2016    Swelling Tamiflu [Oseltamivir Phosphate]  12/29/2014   Systemic Swelling Current Immunizations  Reviewed on 11/21/2014 Name Date Influenza High Dose Vaccine PF 10/15/2014 Influenza Vaccine Whole 10/1/2012 Pneumococcal Vaccine (Unspecified Type) 11/21/2013 Not reviewed this visit You Were Diagnosed With   
  
 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 
ICD-9-CM: 346.13 Migraine without aura and with status migrainosus, not intractable     ICD-10-CM: G43.001 ICD-9-CM: 346.12 Vitals BP Pulse Temp Resp Height(growth percentile) Weight(growth percentile) 110/70 89 98.9 °F (37.2 °C) 17 5' 6\" (1.676 m) 135 lb (61.2 kg) SpO2 BMI OB Status Smoking Status 98% 21.79 kg/m2 Postmenopausal Never Smoker Vitals History BMI and BSA Data Body Mass Index Body Surface Area 21.79 kg/m 2 1.69 m 2 Preferred Pharmacy Pharmacy Name Phone [facility] [address], [address] [phone] Your Updated Medication List  
  
   
This list is accurate as of 3/22/18  3:51 PM.  Always use your most recent med list.  
  
  
  
  
 citalopram 20 mg tablet Commonly known as:  CELEXA  
  
 cyclobenzaprine 10 mg tablet Commonly known as:  FLEXERIL Take 1 Tab by mouth three (3) times daily as needed. DYMISTA 137-50 mcg/spray [place] Generic drug:  azelastine-fluticasone  
by Nasal route. eletriptan 40 mg tablet Commonly known as:  RELPAX TAKE ONE TABLET BY MOUTH AT ONSET OF HEADACHE. MAY REPEAT IN 2 HOURS IF NEEDED. **DO NOT TAKE MORE THAN 2 TABELTS PER DAY**  
  
 * EPINEPHrine 0.15 mg/0.3 mL injection Commonly known as:  EPIPEN JR  
0.15 mg by IntraMUSCular route once as needed. * EPINEPHrine 0.3 mg/0.3 mL injection Commonly known as:  EPIPEN  
  
 fluticasone 110 mcg/actuation inhaler Commonly known as:  FLOVENT HFA Take 2 Puffs by inhalation two (2) times a day. losartan 50 mg tablet Commonly known as:  COZAAR  
TAKE ONE TABLET BY MOUTH DAILY MOVANTIK 25 mg Tab tablet Generic drug:  naloxegol PROBIOTIC 4X 10-15 mg Tbec Generic drug:  B.infantis-B.ani-B.long-B.bifi Take 2 tablets by mouth daily. REMICADE IV  
by IntraVENous route every six (6) weeks. SUMAtriptan succinate 3 mg/0.5 mL Pnij Commonly known as:  Gilberto Latrell  
1 at HA onset and repeat in 1 hour x 1 prn  Max 4 in 24 hours * VENTOLIN HFA 90 mcg/actuation inhaler Generic drug:  albuterol INHALE TWO PUFFS BY MOUTH EVERY 4 HOURS AS NEEDED  
  
 * albuterol 2.5 mg /3 mL (0.083 %) nebulizer solution Commonly known as:  PROVENTIL VENTOLIN  
3 mL by Nebulization route every four (4) hours as needed for Wheezing. VITAMIN B-12 1,000 mcg tablet Generic drug:  cyanocobalamin Take 1,000 mcg by mouth daily. VITAMIN C 500 mg tablet Generic drug:  ascorbic acid (vitamin C) Take 1,000 mg by mouth daily. VITAMIN D3 2,000 unit Tab Generic drug:  cholecalciferol (vitamin D3) Take 2 tablets by mouth daily. * Notice: This list has 4 medication(s) that are the same as other medications prescribed for you. Read the directions carefully, and ask your doctor or other care provider to review them with you. Prescriptions Sent to Pharmacy Refills SUMAtriptan succinate (ZEMBRACE SYMTOUCH) 3 mg/0.5 mL pnij 6 Si at HA onset and repeat in 1 hour x 1 prn  Max 4 in 24 hours Class: Normal  
 Pharmacy: [facility], [address] Ph #: [phone]  
 eletriptan (RELPAX) 40 mg tablet 3 Sig: TAKE ONE TABLET BY MOUTH AT ONSET OF HEADACHE. MAY REPEAT IN 2 HOURS IF NEEDED. **DO NOT TAKE MORE THAN 2 TABELTS PER DAY**  
 Class: Normal  
 Pharmacy: [facility], [address] Ph #: [phone] We Performed the Following REFERRAL TO NEUROLOGY [[id number] Custom] Comments:  
 Left supraorbital nerve block Follow-up Instructions Return for After block. Referral Information Referral ID Referred By Referred To  
  
 [id number] Po DAWKINS MD   
   [place] [address] [address] [place] [address], [address] [facility] Phone: [phone] Fax: [phone] Visits Status Start Date End Date 1 New Request 3/22/18 3/22/19 If your referral has a status of pending review or denied, additional information will be sent to support the outcome of this decision. Patient Instructions Information Regarding Testing If you have physican order for a test or a medication denied by your insurance company, this does not mean the test or medication is not appropriate for you as that is a medical decision, not a decision to be made by an insurance company representative or by an [facility] physician who has not interviewed and examined you. This is a decision to be made between you and your physician. The denial of services is a contractual matter between you and your insurance company, not an issue between your physician and the insurance company. If your test or medication is denied, you can take the following steps to help resolve the issue: 1. File a complaint with the [facility] regarding your insurance company's denial of services ordered for you. You can do this either by calling them directly or by completing an on-line complaint form on the Smarp.. This can be found at www.[place].aaTag 2. Also file a formal complaint with your insurance company and ask to have the name of the person denying the service so that you may explore a legal option should you be harmed by this denial of service. Again, the fact the insurance company will not pay for the service does not mean it is not medically necessary and I would encourage you to follow through with the plan that was made with your physician 3. File a written complaint with your employer so your employer and benefit manager is aware of the poor coverage they are providing their employees. If you have medicare/medicaid, complain to your representative in the House and to your Anuel Garsia. PRESCRIPTION REFILL POLICY [facility]rology [facility] Statement to Patients April 1, 2014 In an effort to ensure the large volume of patient prescription refills is processed in the most efficient and expeditious manner, we are asking our patients to assist us by calling your Pharmacy for all prescription refills, this will include also your  Mail Order Pharmacy. The pharmacy will contact our office electronically to continue the refill process. Please do not wait until the last minute to call your pharmacy. We need at least 48 hours (2days) to fill prescriptions. We also encourage you to call your pharmacy before going to  your prescription to make sure it is ready. With regard to controlled substance prescription refill requests (narcotic refills) that need to be picked up at our office, we ask your cooperation by providing us with at least 72 hours (3days) notice that you will need a refill. We will not refill narcotic prescription refill requests after 4:00pm on any weekday, Monday through Thursday, or after 2:00pm on Fridays, or on the weekends. We encourage everyone to explore another way of getting your prescription refill request processed using [employer], our patient web portal through our electronic medical record system. [employer] is an efficient and effective way to communicate your medication request directly to the office and  downloadable as an linda on your smart phone . [employer] also features a review functionality that allows you to view your medication list as well as leave messages for your physician. Are you ready to get connected? If so please review the attatched instructions or speak to any of our staff to get you set up right away! Thank you so much for your cooperation. Should you have any questions please contact our Practice Administrator. The Physicians and Staff,  [facility] If we have ordered testing for you, we do not call patients with results and we do not give test results over the phone. We schedule follow up appointments so that your results can be discussed in person and any questions you have regarding them may be addressed.   If something of concern is revealed on your test, we will call you for a sooner follow up appointment. Additionally, results may be found by using the My Chart feature and one of our patient service representatives at the  can give you instructions on how to access this feature of our electronic medical record system. Michael Hendricks 1721 What is a living will? A living will is a legal form you use to write down the kind of care you want at the end of your life. It is used by the health professionals who will treat you if you aren't able to decide for yourself. If you put your wishes in writing, your loved ones and others will know what kind of care you want. They won't need to guess. This can ease your mind and be helpful to others. A living will is not the same as an estate or property will. An estate will explains what you want to happen with your money and property after you die. Is a living will a legal document? A living will is a legal document. Each state has its own laws about living teran. If you move to another state, make sure that your living will is legal in the state where you now live. Or you might use a universal form that has been approved by many states. This kind of form can sometimes be completed and stored online. Your electronic copy will then be available wherever you have a connection to the Internet. In most cases, doctors will respect your wishes even if you have a form from a different state. · You don't need an  to complete a living will. But legal advice can be helpful if your state's laws are unclear, your health history is complicated, or your family can't agree on what should be in your living will. · You can change your living will at any time. Some people find that their wishes about end-of-life care change as their health changes.  
· In addition to making a living will, think about completing a medical power of  form. This form lets you name the person you want to make end-of-life treatment decisions for you (your \"health care agent\") if you're not able to. Many hospitals and nursing homes will give you the forms you need to complete a living will and a medical power of . · Your living will is used only if you can't make or communicate decisions for yourself anymore. If you become able to make decisions again, you can accept or refuse any treatment, no matter what you wrote in your living will. · Your state may offer an online registry. This is a place where you can store your living will online so the doctors and nurses who need to treat you can find it right away. What should you think about when creating a living will? Talk about your end-of-life wishes with your family members and your doctor. Let them know what you want. That way the people making decisions for you won't be surprised by your choices. Think about these questions as you make your living will: · Do you know enough about life support methods that might be used? If not, talk to your doctor so you know what might be done if you can't breathe on your own, your heart stops, or you're unable to swallow. · What things would you still want to be able to do after you receive life-support methods? Would you want to be able to walk? To speak? To eat on your own? To live without the help of machines? · If you have a choice, where do you want to be cared for? In your home? At a hospital or nursing home? · Do you want certain Buddhism practices performed if you become very ill? · If you have a choice at the end of your life, where would you prefer to die? At home? In a hospital or nursing home? Somewhere else? · Would you prefer to be buried or cremated? · Do you want your organs to be donated after you die? What should you do with your living will?  
· Make sure that your family members and your health care agent have copies of your living will. · Give your doctor a copy of your living will to keep in your medical record. If you have more than one doctor, make sure that each one has a copy. · You may want to put a copy of your living will where it can be easily found. Where can you learn more? Go to http://geraldo-samuel.info/. Enter K018 in the search box to learn more about \"Learning About Living Meri. \" Current as of: September 24, 2016 Content Version: 11.4 © 3840-1446 [employer]. Care instructions adapted under license by [employer] (which disclaims liability or warranty for this information). If you have questions about a medical condition or this instruction, always ask your healthcare professional. [address] any warranty or liability for your use of this information. Introducing hospitals & HEALTH SERVICES! Dear Kwabena Chavis: Thank you for requesting a GAMEVIL account. Our records indicate that you already have an active GAMEVIL account. You can access your account anytime at [email or web address] Did you know that you can access your hospital and ER discharge instructions at any time in GAMEVIL? You can also review all of your test results from your hospital stay or ER visit. Additional Information If you have questions, please visit the Frequently Asked Questions section of the GAMEVIL website at [email or web address] Remember, GAMEVIL is NOT to be used for urgent needs. For medical emergencies, dial 911. Now available from your iPhone and Android! Please provide this summary of care documentation to your next provider. Your primary care clinician is listed as Tonie Carl. If you have any questions after today's visit, please call [phone].

## 2018-03-22 NOTE — PROGRESS NOTES
[facility]   [place] [address]   [address]   [phone] [facility] [place]   [phone] Fax               Chief Complaint   Patient presents with    Migraine     follow up     Current Outpatient Prescriptions   Medication Sig Dispense Refill    eletriptan (RELPAX) 40 mg tablet TAKE ONE TABLET BY MOUTH AT ONSET OF HEADACHE. MAY REPEAT IN 2 HOURS IF NEEDED. **DO NOT TAKE MORE THAN 2 TABELTS PER DAY** 9 Tab 0    citalopram (CELEXA) 20 mg tablet       EPINEPHrine (EPIPEN) 0.3 mg/0.3 mL injection       INFLIXIMAB (REMICADE IV) by IntraVENous route every six (6) weeks.  azelastine-fluticasone (DYMISTA) 137-50 mcg/spray spry by Nasal route.  MOVANTIK 25 mg tab tablet       losartan (COZAAR) 50 mg tablet TAKE ONE TABLET BY MOUTH DAILY 90 Tab 0    EPINEPHrine (EPIPEN JR) 0.15 mg/0.3 mL (1:2,000) injection 0.15 mg by IntraMUSCular route once as needed.  albuterol (PROVENTIL VENTOLIN) 2.5 mg /3 mL (0.083 %) nebulizer solution 3 mL by Nebulization route every four (4) hours as needed for Wheezing. 1 Package 4    VENTOLIN HFA 90 mcg/actuation inhaler INHALE TWO PUFFS BY MOUTH EVERY 4 HOURS AS NEEDED 1 Inhaler 4    cyclobenzaprine (FLEXERIL) 10 mg tablet Take 1 Tab by mouth three (3) times daily as needed. 90 Tab 1    fluticasone (FLOVENT HFA) 110 mcg/actuation inhaler Take 2 Puffs by inhalation two (2) times a day. 1 Inhaler 5    ascorbic acid (VITAMIN C) 500 mg tablet Take 1,000 mg by mouth daily.  cholecalciferol, vitamin D3, (VITAMIN D3) 2,000 unit Tab Take 2 tablets by mouth daily.  cyanocobalamin (VITAMIN B-12) 1,000 mcg tablet Take 1,000 mcg by mouth daily.  B.infantis-B.ani-B.long-B.bifi (PROBIOTIC 4X) 10-15 mg Tab Take 2 tablets by mouth daily.         Allergies   Allergen Reactions    Azithromycin Rash    Carafate [Sucralfate] Swelling    Cephalexin Hcl Rash    Enbrel [Etanercept] Other (comments)     Blisters at injection sites    Penicillin G Rash    Protonix [Pantoprazole] Swelling    Tamiflu [Oseltamivir Phosphate] Swelling     Social History   Substance Use Topics    Smoking status: Never Smoker    Smokeless tobacco: Never Used    Alcohol use Yes      Comment: socially     Patient returns today for follow-up headaches on a combination of migraine as well as supraorbital neuralgia left-sided. She had a supraorbital nerve block with Dr. Tolu Aguirre previously and she had good result with that. She notes that her headache frequency decreased markedly after that. She has had over the last 3 months or so an increase in her headache frequency. She is using over-the-counter medication in between her Relpax. She is running out of Relpax each month. She had to change to the generic after changing insurance companies back in January. She does not feel that she gets the quick onset that she used to get with the brand name. No focal deficits. Headaches are localizing over the left brow and radiating upward and again are akin to the supraorbital pain she was having before with the supraorbital neuralgia. That pain will then kick off an intense migraine. No focal deficits. No fever. No chills. No new symptom. Examination  Visit Vitals    /70    Pulse 89    Temp 98.9 °F (37.2 °C)    Resp 17    Ht 5' 6\" (1.676 m)    Wt 61.2 kg (135 lb)    SpO2 98%    BMI 21.79 kg/m2     She is a very pleasant lady. She has appropriate dress and appropriate grooming. Her affect is appropriate. No icterus. No edema. Her neck is supple. No bruit. Heart is regular. Symmetric pulses. She is exquisitely tender over the left supraorbital notch and this reproduces the pain of which she complains. Some tenderness over the left occipital groove as well. Intact cranial nerves II-XII. No nystagmus. No pronation or drift. Reflexes are symmetrical upper and lower extremities bilaterally. No pathologic reflexes.   No ataxia. Gait steady. Impression/Plan  Headaches increasing in frequency and intensity and she is exquisitely tender over the right supraorbital notch and she got good relief from her previous supraorbital nerve block. We will arrange for her to have another supraorbital nerve block with Dr. Prince Nixon. Migraine headaches as well and I think these are being triggered by the supraorbital neuralgia. The generic Relpax has not been as effective as the brand name Relpax and we discussed this but we will maintain Relpax generic version for now. We did give her some Zembrace injections to use for when the Relpax fails or for when she awakens with a migraine because she is already in a full-blown headache situation and the oral medications are going to be less effective. We will use the 3 mg Zembrace secondary to the fact that there is better side effect profile than the 6 mg and we did discuss administration, potential side effects, potential benefits and alternatives and I did demonstrate the autoinjector for her today. Continue to track headaches. I will see her back about 4 weeks after the block. She is also using over-the-counter medications and we discussed stopping those completely. Total time: 25 min   Counseling / coordination time: 15 min   > 50% counseling / coordination?: Yes re: treatment options, questions, meds, etc    Suhail Johnson MD    This note was created using voice recognition software. Despite editing, there may be syntax errors. This note will not be viewable in [address]

## 2018-03-28 ENCOUNTER — TELEPHONE (OUTPATIENT)
Dept: NEUROLOGY | Age: 58
End: 2018-03-28

## 2018-04-02 ENCOUNTER — TELEPHONE (OUTPATIENT)
Dept: NEUROLOGY | Age: 58
End: 2018-04-02

## 2018-04-02 NOTE — TELEPHONE ENCOUNTER
----- Message from Jacinta Flores sent at 4/2/2018 12:04 PM EDT -----  Regarding: Dr. Elaina Hussein  Pt missed a call from the practice and would like for another attempt to be made. Best contact number is [phone].

## 2018-04-04 ENCOUNTER — OFFICE VISIT (OUTPATIENT)
Dept: NEUROLOGY | Age: 58
End: 2018-04-04

## 2018-04-04 VITALS
BODY MASS INDEX: 21.69 KG/M2 | DIASTOLIC BLOOD PRESSURE: 82 MMHG | WEIGHT: 135 LBS | SYSTOLIC BLOOD PRESSURE: 120 MMHG | HEIGHT: 66 IN | HEART RATE: 88 BPM | OXYGEN SATURATION: 98 %

## 2018-04-04 DIAGNOSIS — R51.9 OCCIPITAL PAIN: ICD-10-CM

## 2018-04-04 DIAGNOSIS — G50.0 SUPRAORBITAL NEURALGIA: Primary | ICD-10-CM

## 2018-04-04 NOTE — MR AVS SNAPSHOT
[facility] [place] 
 
 
 [place] [address] [address] [phone] Patient: Arlette Hurst MRN: [id number] MBS:[birth year] Visit Information Date & Time Provider Department Dept. Phone Encounter #  
 4/4/2018  3:40 PM Edi Calderon MD [facility] [employer] [phone] [id number] Your Appointments 4/4/2018  3:40 PM  
PROCEDURE with Edi Calderon MD  
[facility]) Appt Note: block reggie  
 [place] [address] [address] [phone]  
  
   
 [place] [address] [id number] [address] Upcoming Health Maintenance Date Due DTaP/Tdap/Td series (1 - Tdap) 9/7/1980 Pneumococcal 19-64 Highest Risk (2 of 3 - PCV13) 11/21/2014 PAP AKA CERVICAL CYTOLOGY 1/1/2016 BREAST CANCER SCRN MAMMOGRAM 12/16/2016 COLONOSCOPY 1/1/2021 Allergies as of 4/4/2018  Review Complete On: 4/4/2018 By: Ana Luisa Yang LPN Severity Noted Reaction Type Reactions Azithromycin  10/25/2012   Side Effect Rash Carafate [Sucralfate]  07/07/2016    Swelling Cephalexin Hcl  10/25/2012   Side Effect Rash Enbrel [Etanercept]  10/20/2015    Other (comments) Blisters at injection sites Penicillin G  10/25/2012    Rash Protonix [Pantoprazole]  07/07/2016    Swelling Tamiflu [Oseltamivir Phosphate]  12/29/2014   Systemic Swelling Current Immunizations  Reviewed on 11/21/2014 Name Date Influenza High Dose Vaccine PF 10/15/2014 Influenza Vaccine Whole 10/1/2012 Pneumococcal Vaccine (Unspecified Type) 11/21/2013 Not reviewed this visit You Were Diagnosed With   
  
 Codes Comments Supraorbital neuralgia    -  Primary ICD-10-CM: G52.9 ICD-9-CM: 352.9 Vitals BP Pulse Height(growth percentile) Weight(growth percentile) SpO2 BMI 120/82 (BP 1 Location: Left arm, BP Patient Position: Sitting) 88 5' 6\" (1.676 m) 135 lb (61.2 kg) 98% 21.79 kg/m2 OB Status Smoking Status Postmenopausal Never Smoker BMI and BSA Data Body Mass Index Body Surface Area 21.79 kg/m 2 1.69 m 2 Preferred Pharmacy Pharmacy Name Phone [facility], [address] [phone] Your Updated Medication List  
  
   
This list is accurate as of 4/4/18  2:34 PM.  Always use your most recent med list.  
  
  
  
  
 citalopram 20 mg tablet Commonly known as:  CELEXA  
  
 cyclobenzaprine 10 mg tablet Commonly known as:  FLEXERIL Take 1 Tab by mouth three (3) times daily as needed. DYMISTA 137-50 mcg/spray [place] Generic drug:  azelastine-fluticasone  
by Nasal route. eletriptan 40 mg tablet Commonly known as:  RELPAX TAKE ONE TABLET BY MOUTH AT ONSET OF HEADACHE. MAY REPEAT IN 2 HOURS IF NEEDED. **DO NOT TAKE MORE THAN 2 TABELTS PER DAY**  
  
 * EPINEPHrine 0.15 mg/0.3 mL injection Commonly known as:  EPIPEN JR  
0.15 mg by IntraMUSCular route once as needed. * EPINEPHrine 0.3 mg/0.3 mL injection Commonly known as:  EPIPEN  
  
 fluticasone 110 mcg/actuation inhaler Commonly known as:  FLOVENT HFA Take 2 Puffs by inhalation two (2) times a day. losartan 50 mg tablet Commonly known as:  COZAAR  
TAKE ONE TABLET BY MOUTH DAILY MOVANTIK 25 mg Tab tablet Generic drug:  naloxegol PROBIOTIC 4X 10-15 mg Tbec Generic drug:  B.infantis-B.ani-B.long-B.bifi Take 2 tablets by mouth daily. REMICADE IV  
by IntraVENous route every six (6) weeks. SUMAtriptan succinate 3 mg/0.5 mL Pnij Commonly known as:  Algis Judie  
1 at WOODS onset and repeat in 1 hour x 1 prn  Max 4 in 24 hours * VENTOLIN HFA 90 mcg/actuation inhaler Generic drug:  albuterol INHALE TWO PUFFS BY MOUTH EVERY 4 HOURS AS NEEDED  
  
 * albuterol 2.5 mg /3 mL (0.083 %) nebulizer solution Commonly known as:  PROVENTIL VENTOLIN  
3 mL by Nebulization route every four (4) hours as needed for Wheezing. VITAMIN B-12 1,000 mcg tablet Generic drug:  cyanocobalamin Take 1,000 mcg by mouth daily. VITAMIN C 500 mg tablet Generic drug:  ascorbic acid (vitamin C) Take 1,000 mg by mouth daily. VITAMIN D3 2,000 unit Tab Generic drug:  cholecalciferol (vitamin D3) Take 2 tablets by mouth daily. * Notice: This list has 4 medication(s) that are the same as other medications prescribed for you. Read the directions carefully, and ask your doctor or other care provider to review them with you. Introducing [facility]! Dear [facility]: Thank you for requesting a Appiphany account. Our records indicate that you already have an active Appiphany account. You can access your account anytime at [email or web address]. [employer]le[employer] Brown/[employer] Did you know that you can access your hospital and ER discharge instructions at any time in App[employer]any? You can also review all of your test results from your hospital stay or ER visit. Additional Information If you have questions, please visit the Frequently Asked Questions section of the Appiphany website at [email or web address] [employer]/. Remember, Appiphany is NOT to be used for urgent needs. For medical emergencies, dial 911. Now available from your iPhone and Android! Please provide this summary of care documentation to your next provider. Your primary care clinician is listed as Thelma Best. If you have any questions after today's visit, please call [phone].

## 2018-04-04 NOTE — PROCEDURES
[facility]  OFFICE PROCEDURE PROGRESS NOTE        Chart reviewed for the following:   Cher Mora MD, have reviewed the History, Physical and updated the Allergic reactions for Maria Guadalupe Schwab performed immediately prior to start of procedure:   Cher Mora MD, have performed the following reviews on June Colon prior to the start of the procedure:            * Patient was identified by name and date of birth   * Agreement on procedure being performed was verified  * Risks and Benefits explained to the patient  * Procedure site verified and marked as necessary  * Patient was positioned for comfort  * Consent was signed and verified     Time: 6288  Date of procedure: 4/4/2018  Procedure performed by:  Riya Bishop MD  Provider assisted by: none  Patient assisted by: self  How tolerated by patient:  tolerated the procedure well with no complications    Comments: none          June Colon       [id number]      4/4/2018    PERFORMING PHYSICIAN: Riya Bishop MD   PROCEDURE: Supraorbital nerve block, LEFT  CPT Code: 55397  ICD-10 Code: G52.9 [Supraorbital neuralgia]     SUPPLIES: 0.5 mL Bupivicaine 0.5%, 0.5 mL Depo-medrol 40 mg/ mL, 1 mL syringe, 30G 1/2'\" needle     TECHNICAL: The procedure and potential complications were explained to the patient, and verbal consent was obtained. The patient was placed in a slightly inclined position on the bed. The area of tenderness was palpated over the LEFT mid-pupillary line, on the superior orbital rim. A line was drawn on the forehead to namrata the mid-pupillary position. While holding gauze over the LEFT eyelid, the area was cleaned with alcohol swab x 3. The above medications were drawn up in sterile fashion. The area was sterilized again with alcohol swabs. The needle was inserted perpendicular to the supra-orbital ridge at the level of the mid-pupillary line.   After contacting bony ridge, the needle was slightly withdrawn, directed superiorly, and after negative aspiration, the solution was injected in a fan-like distribution. The needle was withdrawn and there were no obvious complications. The patient had full range of eye movements without any visual deficit and ambulated out of the room without assistance.

## 2018-04-04 NOTE — PROGRESS NOTES
See procedure note for details    Pt reported that last injection (2016; left supraorbital and left auriculotemporal nerve blocks) worked better than the injection prior to that (left supraorbital). When asked today if she has frequent pain around the left ear, she says no, pain focused in left supraorbital region and extending to back of head on left where she notes tenderness (is tender in left occipital / suboccipital area on exam). D/w her that I think the 2nd left supraorbital nerve block is what reduced her pain, and I don't see any reason to do the left auriculotemporal nerve block as she's not complaining of prem-auricular pain. Suggested to her that we only do the left Supraorbital nerve block today and if she's still having headache/ occipital pain, she call back and ask Dr Buck Hayden nurse/ Precious Kidd to get authorization for left occipital nerve block which he or I could do. She agrees with that approach.

## 2018-04-09 ENCOUNTER — TELEPHONE (OUTPATIENT)
Dept: NEUROLOGY | Age: 58
End: 2018-04-09

## 2018-04-09 NOTE — TELEPHONE ENCOUNTER
----- Message from [facility] sent at 4/9/2018  8:38 AM EDT -----  Regarding: Dr. Morris Smoker  ##urgent##   Pt says that the injectables that was prescribed has to be sent to another pharmacy. It is unavailable at [facility]. Pls call pt [phone] before filling so she knows where to go.

## 2018-04-10 NOTE — TELEPHONE ENCOUNTER
Spoke to patient who requested [facility].  [facility]. Patient also asked about another block per Dr Crystal Stewart recommendation. Informed patient I would notify provider and return call with his recommendations. Patient stated understanding.

## 2018-04-11 RX ORDER — CEFUROXIME AXETIL 250 MG/1
6 TABLET ORAL
Qty: 1 KIT | Refills: 5 | Status: SHIPPED | OUTPATIENT
Start: 2018-04-11 | End: 2018-09-04

## 2018-04-25 ENCOUNTER — OFFICE VISIT (OUTPATIENT)
Dept: NEUROLOGY | Age: 58
End: 2018-04-25

## 2018-04-25 VITALS
OXYGEN SATURATION: 97 % | HEART RATE: 86 BPM | RESPIRATION RATE: 17 BRPM | TEMPERATURE: 99.5 F | HEIGHT: 66 IN | DIASTOLIC BLOOD PRESSURE: 80 MMHG | SYSTOLIC BLOOD PRESSURE: 122 MMHG

## 2018-04-25 DIAGNOSIS — M54.81 OCCIPITAL NEURALGIA OF LEFT SIDE: Primary | ICD-10-CM

## 2018-04-25 NOTE — PATIENT INSTRUCTIONS
Information Regarding Testing     If you have physican order for a test or a medication denied by your insurance company, this does not mean the test or medication is not appropriate for you as that is a medical decision, not a decision to be made by an insurance company representative or by an [facility] physician who has not interviewed and examined you. This is a decision to be made between you and your physician. The denial of services is a contractual matter between you and your insurance company, not an issue between your physician and the insurance company. If your test or medication is denied, you can take the following steps to help resolve the issue:    1. File a complaint with the [facility] regarding your insurance company's denial of services ordered for you. You can do this either by calling them directly or by completing an on-line complaint form on the [employer]. This can be found at [email or web address]    2. Also file a formal complaint with your insurance company and ask to have the name of the person denying the service so that you may explore a legal option should you be harmed by this denial of service. Again, the fact the insurance company will not pay for the service does not mean it is not medically necessary and I would encourage you to follow through with the plan that was made with your physician    3. File a written complaint with your employer so your employer and benefit manager is aware of the poor coverage they are providing their employees. If you have medicare/medicaid, complain to your representative in the House and to your Anuel Garsia.     [facility]   Statement to Patients  April 1, 2014      In an effort to ensure the large volume of patient prescription refills is processed in the most efficient and expeditious manner, we are asking our patients to assist us by calling your Pharmacy for all prescription refills, this will include also your  Mail Order Pharmacy. The pharmacy will contact our office electronically to continue the refill process. Please do not wait until the last minute to call your pharmacy. We need at least 48 hours (2days) to fill prescriptions. We also encourage you to call your pharmacy before going to  your prescription to make sure it is ready. With regard to controlled substance prescription refill requests (narcotic refills) that need to be picked up at our office, we ask your cooperation by providing us with at least 72 hours (3days) notice that you will need a refill. We will not refill narcotic prescription refill requests after 4:00pm on any weekday, Monday through Thursday, or after 2:00pm on Fridays, or on the weekends. We encourage everyone to explore another way of getting your prescription refill request processed using MICMALI, our patient web portal through our electronic medical record system. MICMALI is an efficient and effective way to communicate your medication request directly to the office and  downloadable as an linda on your smart phone . MICMALI also features a review functionality that allows you to view your medication list as well as leave messages for your physician. Are you ready to get connected? If so please review the attatched instructions or speak to any of our staff to get you set up right away! Thank you so much for your cooperation. Should you have any questions please contact our Practice Administrator. The Physicians and Staff,  [facility]     If we have ordered testing for you, we do not call patients with results and we do not give test results over the phone. We schedule follow up appointments so that your results can be discussed in person and any questions you have regarding them may be addressed.   If something of concern is revealed on your test, we will call you for a sooner follow up appointment. Additionally, results may be found by using the My Chart feature and one of our patient service representatives at the  can give you instructions on how to access this feature of our electronic medical record system. Learning About Living Meri  What is a living will? A living will is a legal form you use to write down the kind of care you want at the end of your life. It is used by the health professionals who will treat you if you aren't able to decide for yourself. If you put your wishes in writing, your loved ones and others will know what kind of care you want. They won't need to guess. This can ease your mind and be helpful to others. A living will is not the same as an estate or property will. An estate will explains what you want to happen with your money and property after you die. Is a living will a legal document? A living will is a legal document. Each state has its own laws about living teran. If you move to another state, make sure that your living will is legal in the state where you now live. Or you might use a universal form that has been approved by many states. This kind of form can sometimes be completed and stored online. Your electronic copy will then be available wherever you have a connection to the Internet. In most cases, doctors will respect your wishes even if you have a form from a different state. · You don't need an  to complete a living will. But legal advice can be helpful if your state's laws are unclear, your health history is complicated, or your family can't agree on what should be in your living will. · You can change your living will at any time. Some people find that their wishes about end-of-life care change as their health changes. · In addition to making a living will, think about completing a medical power of  form.  This form lets you name the person you want to make end-of-life treatment decisions for you (your \"health care agent\") if you're not able to. Many hospitals and nursing homes will give you the forms you need to complete a living will and a medical power of . · Your living will is used only if you can't make or communicate decisions for yourself anymore. If you become able to make decisions again, you can accept or refuse any treatment, no matter what you wrote in your living will. · Your state may offer an online registry. This is a place where you can store your living will online so the doctors and nurses who need to treat you can find it right away. What should you think about when creating a living will? Talk about your end-of-life wishes with your family members and your doctor. Let them know what you want. That way the people making decisions for you won't be surprised by your choices. Think about these questions as you make your living will:  · Do you know enough about life support methods that might be used? If not, talk to your doctor so you know what might be done if you can't breathe on your own, your heart stops, or you're unable to swallow. · What things would you still want to be able to do after you receive life-support methods? Would you want to be able to walk? To speak? To eat on your own? To live without the help of machines? · If you have a choice, where do you want to be cared for? In your home? At a hospital or nursing home? · Do you want certain Jainism practices performed if you become very ill? · If you have a choice at the end of your life, where would you prefer to die? At home? In a hospital or nursing home? Somewhere else? · Would you prefer to be buried or cremated? · Do you want your organs to be donated after you die? What should you do with your living will? · Make sure that your family members and your health care agent have copies of your living will. · Give your doctor a copy of your living will to keep in your medical record.  If you have more than one doctor, make sure that each one has a copy. · You may want to put a copy of your living will where it can be easily found. Where can you learn more? Go to http://geraldo-samuel.info/. Enter B932 in the search box to learn more about \"Learning About Living [place]. \"  Current as of: September 24, 2016  Content Version: 11.4  © 0237-3544 [employer]. Care instructions adapted under license by [employer] (which disclaims liability or warranty for this information). If you have questions about a medical condition or this instruction, always ask your healthcare professional. [address] any warranty or liability for your use of this information.

## 2018-04-25 NOTE — PROGRESS NOTES
[address] [place] [address]. [address]   [place] [address]   [place], [address] [place] [address] Drive   [phone] GSXQ   [phone] Fax               Chief Complaint   Patient presents with    Procedure     follow up     Current Outpatient Prescriptions   Medication Sig Dispense Refill    SUMAtriptan succinate (IMITREX STATDOSE PEN) 6 mg/0.5 mL kit 6 mg by SubCUTAneous route once as needed for Migraine for up to 1 dose. 1 Kit 5    SUMAtriptan succinate (ZEMBRACE SYMTOUCH) 3 mg/0.5 mL pnij 1 at HA onset and repeat in 1 hour x 1 prn  Max 4 in 24 hours 8 Syringe 6    eletriptan (RELPAX) 40 mg tablet TAKE ONE TABLET BY MOUTH AT ONSET OF HEADACHE. MAY REPEAT IN 2 HOURS IF NEEDED. **DO NOT TAKE MORE THAN 2 TABELTS PER DAY** 9 Tab 3    citalopram (CELEXA) 20 mg tablet       EPINEPHrine (EPIPEN) 0.3 mg/0.3 mL injection       INFLIXIMAB (REMICADE IV) by IntraVENous route every six (6) weeks.  azelastine-fluticasone (DYMISTA) 137-50 mcg/spray spry by Nasal route.  MOVANTIK 25 mg tab tablet       losartan (COZAAR) 50 mg tablet TAKE ONE TABLET BY MOUTH DAILY 90 Tab 0    EPINEPHrine (EPIPEN JR) 0.15 mg/0.3 mL (1:2,000) injection 0.15 mg by IntraMUSCular route once as needed.  albuterol (PROVENTIL VENTOLIN) 2.5 mg /3 mL (0.083 %) nebulizer solution 3 mL by Nebulization route every four (4) hours as needed for Wheezing. 1 Package 4    cyclobenzaprine (FLEXERIL) 10 mg tablet Take 1 Tab by mouth three (3) times daily as needed. 90 Tab 1    fluticasone (FLOVENT HFA) 110 mcg/actuation inhaler Take 2 Puffs by inhalation two (2) times a day. 1 Inhaler 5    ascorbic acid (VITAMIN C) 500 mg tablet Take 1,000 mg by mouth daily.  cholecalciferol, vitamin D3, (VITAMIN D3) 2,000 unit Tab Take 2 tablets by mouth daily.  cyanocobalamin (VITAMIN B-12) 1,000 mcg tablet Take 1,000 mcg by mouth daily.  B.infantis-B.ani-B.long-B.bifi (PROBIOTIC 4X) 10-15 mg Tab Take 2 tablets by mouth daily.  VENTOLIN HFA 90 mcg/actuation inhaler INHALE TWO PUFFS BY MOUTH EVERY 4 HOURS AS NEEDED 1 Inhaler 4      Allergies   Allergen Reactions    Azithromycin Rash    Carafate [Sucralfate] Swelling    Cephalexin Hcl Rash    Enbrel [Etanercept] Other (comments)     Blisters at injection sites    Penicillin G Rash    Protonix [Pantoprazole] Swelling    Tamiflu [Oseltamivir Phosphate] Swelling     Social History   Substance Use Topics    Smoking status: Never Smoker    Smokeless tobacco: Never Used    Alcohol use Yes      Comment: socially     Patient returns today for left occipital nerve block. She had a left supraorbital block and that helped her pain. Still having pain left occipital region. Request occipital nerve block on the left. Had these before. We discussed the technical aspects of the procedure risks including infection, bleeding and lack of efficacy. Discussed potential benefits including resolution of symptoms. She wishes to proceed. On examination she is tender over the left occipital notch. Examination  Visit Vitals    /80    Pulse 86    Temp 99.5 °F (37.5 °C)    Resp 17    Ht 5' 6\" (1.676 m)    SpO2 97%     After reviewing the technical aspects of the procedure, the medications used and her previous response to same, Ms. Llana Cockayne consented to the procedure again after reviewing the potential risks and benefits as previously outlined. The patient was identified by her name and date of birth. The left side was identified by the patient as the appropriate procedure side and she identified we were doing occipital nerve block. Description of Procedure: The left side was then addressed. 2 ml of 0.5% Marcaine 2 ml of 40 mg DepoMedrol were intermixed in a single syringe. The left scalp was cleaned in a customary fashion. Three separate injections were made along the course of the left greater occipital nerve.  She tolerated that without any difficulty and endorsed anesthesia over the left head region. She noted that her headache was markedly better at the end of this. Plan: We will proceed with repeat occipital nerve blocks as needed. 6 week follow up    Dejah Espinosa MD            This note was created using voice recognition software. Despite editing, there may be syntax errors.

## 2018-04-25 NOTE — MR AVS SNAPSHOT
[address] [facility] [id number] [phone] Patient: Nelida Humphrey MRN: [id number] Visit Information Date & Time Provider Department Dept. Phone Encounter #  
 4/25/2018  8:20 AM MD Kandy Maldonado [facility] [phone] [id number] Follow-up Instructions Return in about 6 weeks (around 6/6/2018). Upcoming Health Maintenance Date Due DTaP/Tdap/Td series (1 - Tdap) 9/7/1980 Pneumococcal 19-64 Highest Risk (2 of 3 - PCV13) 11/21/2014 PAP AKA CERVICAL CYTOLOGY 1/1/2016 BREAST CANCER SCRN MAMMOGRAM 12/16/2016 COLONOSCOPY 1/1/2021 Allergies as of 4/25/2018  Review Complete On: 4/25/2018 By: Fuentes Kovacs LPN Severity Noted Reaction Type Reactions Azithromycin  10/25/2012   Side Effect Rash Carafate [Sucralfate]  07/07/2016    Swelling Cephalexin Hcl  10/25/2012   Side Effect Rash Enbrel [Etanercept]  10/20/2015    Other (comments) Blisters at injection sites Penicillin G  10/25/2012    Rash Protonix [Pantoprazole]  07/07/2016    Swelling Tamiflu [Oseltamivir Phosphate]  12/29/2014   Systemic Swelling Current Immunizations  Reviewed on 11/21/2014 Name Date Influenza High Dose Vaccine PF 10/15/2014 Influenza Vaccine Whole 10/1/2012 Pneumococcal Vaccine (Unspecified Type) 11/21/2013 Not reviewed this visit Vitals BP Pulse Temp Resp Height(growth percentile) SpO2  
 122/80 86 99.5 °F (37.5 °C) 17 5' 6\" (1.676 m) 97% OB Status Smoking Status Postmenopausal Never Smoker Vitals History Preferred Pharmacy Pharmacy Name Phone [id number] [address] ([address]) [phone] Your Updated Medication List  
  
   
This list is accurate as of 4/25/18  8:42 AM.  Always use your most recent med list.  
  
  
  
  
 citalopram 20 mg tablet Commonly known as:  CELEXA  
  
 cyclobenzaprine 10 mg tablet Commonly known as:  FLEXERIL Take 1 Tab by mouth three (3) times daily as needed. DYMISTA 137-50 mcg/spray [place] Generic drug:  azelastine-fluticasone  
by Nasal route. eletriptan 40 mg tablet Commonly known as:  RELPAX TAKE ONE TABLET BY MOUTH AT ONSET OF HEADACHE. MAY REPEAT IN 2 HOURS IF NEEDED. **DO NOT TAKE MORE THAN 2 TABELTS PER DAY**  
  
 * EPINEPHrine 0.15 mg/0.3 mL injection Commonly known as:  EPIPEN JR  
0.15 mg by IntraMUSCular route once as needed. * EPINEPHrine 0.3 mg/0.3 mL injection Commonly known as:  EPIPEN  
  
 fluticasone 110 mcg/actuation inhaler Commonly known as:  FLOVENT HFA Take 2 Puffs by inhalation two (2) times a day. losartan 50 mg tablet Commonly known as:  COZAAR  
TAKE ONE TABLET BY MOUTH DAILY MOVANTIK 25 mg Tab tablet Generic drug:  naloxegol PROBIOTIC 4X 10-15 mg Tbec Generic drug:  B.infantis-B.ani-B.long-B.bifi Take 2 tablets by mouth daily. REMICADE IV  
by IntraVENous route every six (6) weeks. * SUMAtriptan succinate 3 mg/0.5 mL Pnij Commonly known as:  Marletta Hench  
1 at WOODS onset and repeat in 1 hour x 1 prn  Max 4 in 24 hours * SUMAtriptan succinate 6 mg/0.5 mL kit Commonly known as:  IMITREX STATDOSE PEN  
6 mg by SubCUTAneous route once as needed for Migraine for up to 1 dose. * VENTOLIN HFA 90 mcg/actuation inhaler Generic drug:  albuterol INHALE TWO PUFFS BY MOUTH EVERY 4 HOURS AS NEEDED  
  
 * albuterol 2.5 mg /3 mL (0.083 %) nebulizer solution Commonly known as:  PROVENTIL VENTOLIN  
3 mL by Nebulization route every four (4) hours as needed for Wheezing. VITAMIN B-12 1,000 mcg tablet Generic drug:  cyanocobalamin Take 1,000 mcg by mouth daily. VITAMIN C 500 mg tablet Generic drug:  ascorbic acid (vitamin C) Take 1,000 mg by mouth daily. VITAMIN D3 2,000 unit Tab Generic drug:  cholecalciferol (vitamin D3) Take 2 tablets by mouth daily. * Notice: This list has 6 medication(s) that are the same as other medications prescribed for you. Read the directions carefully, and ask your doctor or other care provider to review them with you. Follow-up Instructions Return in about 6 weeks (around 6/6/2018). Patient Instructions Information Regarding Testing If you have physican order for a test or a medication denied by your insurance company, this does not mean the test or medication is not appropriate for you as that is a medical decision, not a decision to be made by an insurance company representative or by an [facility] physician who has not interviewed and examined you. This is a decision to be made between you and your physician. The denial of services is a contractual matter between you and your insurance company, not an issue between your physician and the insurance company. If your test or medication is denied, you can take the following steps to help resolve the issue: 1. File a complaint with the [facility] of Insurance regarding your insurance company's denial of services ordered for you. You can do this either by calling them directly or by completing an on-line complaint form on the [employer]. This can be found at www.[place].gov 2. Also file a formal complaint with your insurance company and ask to have the name of the person denying the service so that you may explore a legal option should you be harmed by this denial of service. Again, the fact the insurance company will not pay for the service does not mean it is not medically necessary and I would encourage you to follow through with the plan that was made with your physician 3.    File a written complaint with your employer so your employer and benefit manager is aware of the poor coverage they are providing their employees. If you have medicare/medicaid, complain to your representative in the House and to your Anuel Garsia. PRESCRIPTION REFILL POLICY [facility] Statement to Patients April 1, 2014 In an effort to ensure the large volume of patient prescription refills is processed in the most efficient and expeditious manner, we are asking our patients to assist us by calling your Pharmacy for all prescription refills, this will include also your  Mail Order Pharmacy. The pharmacy will contact our office electronically to continue the refill process. Please do not wait until the last minute to call your pharmacy. We need at least 48 hours (2days) to fill prescriptions. We also encourage you to call your pharmacy before going to  your prescription to make sure it is ready. With regard to controlled substance prescription refill requests (narcotic refills) that need to be picked up at our office, we ask your cooperation by providing us with at least 72 hours (3days) notice that you will need a refill. We will not refill narcotic prescription refill requests after 4:00pm on any weekday, Monday through Thursday, or after 2:00pm on Fridays, or on the weekends. We encourage everyone to explore another way of getting your prescription refill request processed using [employer], our patient web portal through our electronic medical record system. [employer] is an efficient and effective way to communicate your medication request directly to the office and  downloadable as an linda on your smart phone . [employer] also features a review functionality that allows you to view your medication list as well as leave messages for your physician. Are you ready to get connected? If so please review the attatched instructions or speak to any of our staff to get you set up right away! Thank you so much for your cooperation. Should you have any questions please contact our Practice Administrator. The Physicians and Staff,  [address] [place] [address] Neurology [facility] If we have ordered testing for you, we do not call patients with results and we do not give test results over the phone. We schedule follow up appointments so that your results can be discussed in person and any questions you have regarding them may be addressed. If something of concern is revealed on your test, we will call you for a sooner follow up appointment. Additionally, results may be found by using the My Chart feature and one of our patient service representatives at the  can give you instructions on how to access this feature of our electronic medical record system. Michael Hendricks 4707 What is a living will? A living will is a legal form you use to write down the kind of care you want at the end of your life. It is used by the health professionals who will treat you if you aren't able to decide for yourself. If you put your wishes in writing, your loved ones and others will know what kind of care you want. They won't need to guess. This can ease your mind and be helpful to others. A living will is not the same as an estate or property will. An estate will explains what you want to happen with your money and property after you die. Is a living will a legal document? A living will is a legal document. Each state has its own laws about living teran. If you move to another state, make sure that your living will is legal in the state where you now live. Or you might use a universal form that has been approved by many states. This kind of form can sometimes be completed and stored online. Your electronic copy will then be available wherever you have a connection to the Internet. In most cases, doctors will respect your wishes even if you have a form from a different state. · You don't need an  to complete a living will. But legal advice can be helpful if your state's laws are unclear, your health history is complicated, or your family can't agree on what should be in your living will. · You can change your living will at any time. Some people find that their wishes about end-of-life care change as their health changes. · In addition to making a living will, think about completing a medical power of  form. This form lets you name the person you want to make end-of-life treatment decisions for you (your \"health care agent\") if you're not able to. Many hospitals and nursing homes will give you the forms you need to complete a living will and a medical power of . · Your living will is used only if you can't make or communicate decisions for yourself anymore. If you become able to make decisions again, you can accept or refuse any treatment, no matter what you wrote in your living will. · Your state may offer an online registry. This is a place where you can store your living will online so the doctors and nurses who need to treat you can find it right away. What should you think about when creating a living will? Talk about your end-of-life wishes with your family members and your doctor. Let them know what you want. That way the people making decisions for you won't be surprised by your choices. Think about these questions as you make your living will: · Do you know enough about life support methods that might be used? If not, talk to your doctor so you know what might be done if you can't breathe on your own, your heart stops, or you're unable to swallow. · What things would you still want to be able to do after you receive life-support methods? Would you want to be able to walk? To speak? To eat on your own? To live without the help of machines? · If you have a choice, where do you want to be cared for? In your home? At a hospital or nursing home? · Do you want certain Nondenominational practices performed if you become very ill? · If you have a choice at the end of your life, where would you prefer to die? At home? In a hospital or nursing home? Somewhere else? · Would you prefer to be buried or cremated? · Do you want your organs to be donated after you die? What should you do with your living will? · Make sure that your family members and your health care agent have copies of your living will. · Give your doctor a copy of your living will to keep in your medical record. If you have more than one doctor, make sure that each one has a copy. · You may want to put a copy of your living will where it can be easily found. Where can you learn more? Go to http://geraldo-samuel.info/. Enter P920 in the search box to learn more about \"Learning About Living Molly Bray. \" Current as of: September 24, 2016 Content Version: 11.4 © 9718-4791 "[employer] Care instructions adapted under license by [employer] (which disclaims liability or warranty for this information). If you have questions about a medical condition or this instruction, always ask your healthcare professional. [address] any warranty or liability for your use of this information. Introducing [facility]! Dear Urszula Espinal: Thank you for requesting a [employer] account. Our records indicate that you already have an active [employer] account. You can access your account anytime at [email or web address] Did you know that you can access your hospital and ER discharge instructions at any time in [employer]? You can also review all of your test results from your hospital stay or ER visit. Additional Information If you have questions, please visit the Frequently Asked Questions section of the [employer] website at [email or web address] Remember, [employer] is NOT to be used for urgent needs.  For medical emergencies, dial 911. Now available from your iPhone and Android! Please provide this summary of care documentation to your next provider. Your primary care clinician is listed as Germain Santana. If you have any questions after today's visit, please call [phone].

## 2018-07-17 ENCOUNTER — OFFICE VISIT (OUTPATIENT)
Dept: NEUROLOGY | Age: 58
End: 2018-07-17

## 2018-07-17 VITALS
SYSTOLIC BLOOD PRESSURE: 122 MMHG | HEIGHT: 66 IN | BODY MASS INDEX: 22.34 KG/M2 | DIASTOLIC BLOOD PRESSURE: 69 MMHG | OXYGEN SATURATION: 98 % | HEART RATE: 81 BPM | WEIGHT: 139 LBS | TEMPERATURE: 98.4 F | RESPIRATION RATE: 16 BRPM

## 2018-07-17 DIAGNOSIS — G43.001 MIGRAINE WITHOUT AURA AND WITH STATUS MIGRAINOSUS, NOT INTRACTABLE: ICD-10-CM

## 2018-07-17 RX ORDER — ELETRIPTAN HYDROBROMIDE 40 MG/1
TABLET, FILM COATED ORAL
Qty: 9 TAB | Refills: 11 | Status: SHIPPED | OUTPATIENT
Start: 2018-07-17 | End: 2019-01-29 | Stop reason: SDUPTHER

## 2018-07-17 NOTE — MR AVS SNAPSHOT
[address] [address] 
[phone] Patient: Althea Crigler MRN: [id number] DDN:[id number] Visit Information Date & Time Provider Department Dept. Phone Encounter #  
 7/17/2018  2:00 PM Sana Fofana MD [facility] [phone] [id number] Follow-up Instructions Return in about 11 weeks (around 10/2/2018). Upcoming Health Maintenance Date Due DTaP/Tdap/Td series (1 - Tdap) 9/7/1980 Pneumococcal 19-64 Highest Risk (2 of 3 - PCV13) 11/21/2014 PAP AKA CERVICAL CYTOLOGY 1/1/2016 BREAST CANCER SCRN MAMMOGRAM 12/16/2016 Influenza Age 5 to Adult 8/1/2018 COLONOSCOPY 1/1/2021 Allergies as of 7/17/2018  Review Complete On: 7/17/2018 By: Sana Fofana MD  
  
 Severity Noted Reaction Type Reactions Azithromycin  10/25/2012   Side Effect Rash Carafate [Sucralfate]  07/07/2016    Swelling Cephalexin Hcl  10/25/2012   Side Effect Rash Enbrel [Etanercept]  10/20/2015    Other (comments) Blisters at injection sites Penicillin G  10/25/2012    Rash Protonix [Pantoprazole]  07/07/2016    Swelling Tamiflu [Oseltamivir Phosphate]  12/29/2014   Systemic Swelling Current Immunizations  Reviewed on 11/21/2014 Name Date Influenza High Dose Vaccine PF 10/15/2014 Influenza Vaccine Whole 10/1/2012 Pneumococcal Vaccine (Unspecified Type) 11/21/2013 Not reviewed this visit You Were Diagnosed With   
  
 Codes Comments Migraine without aura and with status migrainosus, not intractable     ICD-10-CM: G43.001 ICD-9-CM: 346.12 Vitals BP Pulse Temp Resp Height(growth percentile) Weight(growth percentile) 122/69 81 98.4 °F (36.9 °C) (Oral) 16 5' 6\" (1.676 m) 139 lb (63 kg) SpO2 BMI OB Status Smoking Status 98% 22.44 kg/m2 Postmenopausal Never Smoker Vitals History BMI and BSA Data Body Mass Index Body Surface Area  
 22.44 kg/m 2 1.71 m 2 Preferred Pharmacy Pharmacy Name Phone [facility]/PHARMACY #[id number] [address], [address] ([address] [address]) [phone] Your Updated Medication List  
  
   
This list is accurate as of 7/17/18  2:27 PM.  Always use your most recent med list.  
  
  
  
  
 citalopram 20 mg tablet Commonly known as:  CELEXA  
  
 cyclobenzaprine 10 mg tablet Commonly known as:  FLEXERIL Take 1 Tab by mouth three (3) times daily as needed. DYMISTA 137-50 mcg/spray [place] Generic drug:  azelastine-fluticasone  
by Nasal route. eletriptan 40 mg tablet Commonly known as:  RELPAX TAKE ONE TABLET BY MOUTH AT ONSET OF HEADACHE. MAY REPEAT IN 2 HOURS IF NEEDED. **DO NOT TAKE MORE THAN 2 TABELTS PER DAY**  
  
 erenumab-aooe 70 mg/mL Atin Commonly known as:  AIMOVIG AUTOINJECTOR  
70 mg by SubCUTAneous route every month. fluticasone 110 mcg/actuation inhaler Commonly known as:  FLOVENT HFA Take 2 Puffs by inhalation two (2) times a day. losartan 50 mg tablet Commonly known as:  COZAAR  
TAKE ONE TABLET BY MOUTH DAILY PROBIOTIC 4X 10-15 mg [place] Generic drug:  B.infantis-B.ani-B.long-B.bifi Take 2 tablets by mouth daily. REMICADE IV  
by IntraVENous route every six (6) weeks. * SUMAtriptan succinate 3 mg/0.5 mL Pnij Commonly known as:  Conrad Toan  
1 at HA onset and repeat in 1 hour x 1 prn  Max 4 in 24 hours * SUMAtriptan succinate 6 mg/0.5 mL kit Commonly known as:  IMITREX STATDOSE PEN  
6 mg by SubCUTAneous route once as needed for Migraine for up to 1 dose. * VENTOLIN HFA 90 mcg/actuation inhaler Generic drug:  albuterol INHALE TWO PUFFS BY MOUTH EVERY 4 HOURS AS NEEDED  
  
 * albuterol 2.5 mg /3 mL (0.083 %) nebulizer solution Commonly known as:  PROVENTIL VENTOLIN  
 3 mL by Nebulization route every four (4) hours as needed for Wheezing. VITAMIN B-12 1,000 mcg tablet Generic drug:  cyanocobalamin Take 1,000 mcg by mouth daily. VITAMIN C 500 mg tablet Generic drug:  ascorbic acid (vitamin C) Take 1,000 mg by mouth daily. VITAMIN D3 2,000 unit Tab Generic drug:  cholecalciferol (vitamin D3) Take 2 tablets by mouth daily. * Notice: This list has 4 medication(s) that are the same as other medications prescribed for you. Read the directions carefully, and ask your doctor or other care provider to review them with you. Prescriptions Printed Refills  
 erenumab-aooe (AIMOVIG AUTOINJECTOR) 70 mg/mL atIn 5 Si mg by SubCUTAneous route every month. Class: Print Route: SubCUTAneous Prescriptions Sent to Pharmacy Refills  
 eletriptan (RELPAX) 40 mg tablet 11 Sig: TAKE ONE TABLET BY MOUTH AT ONSET OF HEADACHE. MAY REPEAT IN 2 HOURS IF NEEDED. **DO NOT TAKE MORE THAN 2 TABELTS PER DAY**  
 Class: Normal  
 Pharmacy: [facility]/pharmacy #[id number]- [place], [address] ([place] [address]) Ph #: [phone] Follow-up Instructions Return in about 11 weeks (around 10/2/2018). Patient Instructions PRESCRIPTION REFILL POLICY [facility] Statement to Patients 2014 In an effort to ensure the large volume of patient prescription refills is processed in the most efficient and expeditious manner, we are asking our patients to assist us by calling your Pharmacy for all prescription refills, this will include also your  Mail Order Pharmacy. The pharmacy will contact our office electronically to continue the refill process. Please do not wait until the last minute to call your pharmacy. We need at least 48 hours (2days) to fill prescriptions.  We also encourage you to call your pharmacy before going to  your prescription to make sure it is ready. With regard to controlled substance prescription refill requests (narcotic refills) that need to be picked up at our office, we ask your cooperation by providing us with at least 72 hours (3days) notice that you will need a refill. We will not refill narcotic prescription refill requests after 4:00pm on any weekday, Monday through Thursday, or after 2:00pm on Fridays, or on the weekends. We encourage everyone to explore another way of getting your prescription refill request processed using [employer], our patient web portal through our electronic medical record system. [employer] is an efficient and effective way to communicate your medication request directly to the office and  downloadable as an linda on your smart phone . [employer] also features a review functionality that allows you to view your medication list as well as leave messages for your physician. Are you ready to get connected? If so please review the attatched instructions or speak to any of our staff to get you set up right away! Thank you so much for your cooperation. Should you have any questions please contact our Practice Administrator. The Physicians and Staff,  [facility] A Healthy Lifestyle: Care Instructions Your Care Instructions A healthy lifestyle can help you feel good, stay at a healthy weight, and have plenty of energy for both work and play. A healthy lifestyle is something you can share with your whole family. A healthy lifestyle also can lower your risk for serious health problems, such as high blood pressure, heart disease, and diabetes. You can follow a few steps listed below to improve your health and the health of your family. Follow-up care is a key part of your treatment and safety. Be sure to make and go to all appointments, and call your doctor if you are having problems. It's also a good idea to know your test results and keep a list of the medicines you take. How can you care for yourself at home? · Do not eat too much sugar, fat, or fast foods. You can still have dessert and treats now and then. The goal is moderation. · Start small to improve your eating habits. Pay attention to portion sizes, drink less juice and soda pop, and eat more fruits and vegetables. ¨ Eat a healthy amount of food. A 3-ounce serving of meat, for example, is about the size of a deck of cards. Fill the rest of your plate with vegetables and whole grains. ¨ Limit the amount of soda and sports drinks you have every day. Drink more water when you are thirsty. ¨ Eat at least 5 servings of fruits and vegetables every day. It may seem like a lot, but it is not hard to reach this goal. A serving or helping is 1 piece of fruit, 1 cup of vegetables, or 2 cups of leafy, raw vegetables. Have an apple or some carrot sticks as an afternoon snack instead of a candy bar. Try to have fruits and/or vegetables at every meal. 
· Make exercise part of your daily routine. You may want to start with simple activities, such as walking, bicycling, or slow swimming. Try to be active 30 to 60 minutes every day. You do not need to do all 30 to 60 minutes all at once. For example, you can exercise 3 times a day for 10 or 20 minutes. Moderate exercise is safe for most people, but it is always a good idea to talk to your doctor before starting an exercise program. 
· Keep moving. Devantunde Castillois the lawn, work in the garden, or [employer] [place]. Take the stairs instead of the elevator at work. · If you smoke, quit. People who smoke have an increased risk for heart attack, stroke, cancer, and other lung illnesses. Quitting is hard, but there are ways to boost your chance of quitting tobacco for good. ¨ Use nicotine gum, patches, or lozenges. ¨ Ask your doctor about stop-smoking programs and medicines. ¨ Keep trying.  
In addition to reducing your risk of diseases in the future, you will notice some benefits soon after you stop using tobacco. If you have shortness of breath or asthma symptoms, they will likely get better within a few weeks after you quit. · Limit how much alcohol you drink. Moderate amounts of alcohol (up to 2 drinks a day for men, 1 drink a day for women) are okay. But drinking too much can lead to liver problems, high blood pressure, and other health problems. Family health If you have a family, there are many things you can do together to improve your health. · Eat meals together as a family as often as possible. · Eat healthy foods. This includes fruits, vegetables, lean meats and dairy, and whole grains. · Include your family in your fitness plan. Most people think of activities such as jogging or tennis as the way to fitness, but there are many ways you and your family can be more active. Anything that makes you breathe hard and gets your heart pumping is exercise. Here are some tips: 
¨ Walk to do errands or to take your child to school or the bus. ¨ Go for a family bike ride after dinner instead of watching TV. Where can you learn more? Go to http://geraldo[place]als[place] of Bloomingtonsamuel.info/. Enter P395 in the search box to learn more about \"A Healthy Lifestyle: Care Instructions. \" Current as of: December 7, 2017 Content Version: 11.7 © 9702-3880 [employer]. Care instructions adapted under license by [employer] (which disclaims liability or warranty for this information). If you have questions about a medical condition or this instruction, always ask your healthcare professional. [address] [address] any warranty or liability for your use of this information. Introducing hospitals & HEALTH SERVICES! Dear Mikaela Seay: Thank you for requesting a [employer] account. Our records indicate that you already have an active [employer] account. You can access your account anytime at [email or web address]. [employer] Did you know that you can access your hospital and ER discharge instructions at any time in [employer]? You can also review all of your test results from your hospital stay or ER visit. Additional Information If you have questions, please visit the Frequently Asked Questions section of the [employer] website at [email or web address] Remember, [employer] is NOT to be used for urgent needs. For medical emergencies, dial 911. Now available from your iPhone and Android! Please provide this summary of care documentation to your next provider. Your primary care clinician is listed as Michael Howell. If you have any questions after today's visit, please call [phone].

## 2018-07-17 NOTE — PATIENT INSTRUCTIONS
PRESCRIPTION REFILL POLICY [facility] Statement to Patients April 1, 2014 In an effort to ensure the large volume of patient prescription refills is processed in the most efficient and expeditious manner, we are asking our patients to assist us by calling your Pharmacy for all prescription refills, this will include also your  Mail Order Pharmacy. The pharmacy will contact our office electronically to continue the refill process. Please do not wait until the last minute to call your pharmacy. We need at least 48 hours (2days) to fill prescriptions. We also encourage you to call your pharmacy before going to  your prescription to make sure it is ready. With regard to controlled substance prescription refill requests (narcotic refills) that need to be picked up at our office, we ask your cooperation by providing us with at least 72 hours (3days) notice that you will need a refill. We will not refill narcotic prescription refill requests after 4:00pm on any weekday, Monday through Thursday, or after 2:00pm on Fridays, or on the weekends. We encourage everyone to explore another way of getting your prescription refill request processed using [employer], our patient web portal through our electronic medical record system. [employer] is an efficient and effective way to communicate your medication request directly to the office and  downloadable as an linda on your smart phone . [employer] also features a review functionality that allows you to view your medication list as well as leave messages for your physician. Are you ready to get connected? If so please review the attatched instructions or speak to any of our staff to get you set up right away! Thank you so much for your cooperation. Should you have any questions please contact our Practice Administrator. The Physicians and Staff,  [facility] A Healthy Lifestyle: Care Instructions Your Care Instructions A healthy lifestyle can help you feel good, stay at a healthy weight, and have plenty of energy for both work and play. A healthy lifestyle is something you can share with your whole family. A healthy lifestyle also can lower your risk for serious health problems, such as high blood pressure, heart disease, and diabetes. You can follow a few steps listed below to improve your health and the health of your family. Follow-up care is a key part of your treatment and safety. Be sure to make and go to all appointments, and call your doctor if you are having problems. It's also a good idea to know your test results and keep a list of the medicines you take. How can you care for yourself at home? · Do not eat too much sugar, fat, or fast foods. You can still have dessert and treats now and then. The goal is moderation. · Start small to improve your eating habits. Pay attention to portion sizes, drink less juice and soda pop, and eat more fruits and vegetables. ¨ Eat a healthy amount of food. A 3-ounce serving of meat, for example, is about the size of a deck of cards. Fill the rest of your plate with vegetables and whole grains. ¨ Limit the amount of soda and sports drinks you have every day. Drink more water when you are thirsty. ¨ Eat at least 5 servings of fruits and vegetables every day. It may seem like a lot, but it is not hard to reach this goal. A serving or helping is 1 piece of fruit, 1 cup of vegetables, or 2 cups of leafy, raw vegetables. Have an apple or some carrot sticks as an afternoon snack instead of a candy bar. Try to have fruits and/or vegetables at every meal. 
· Make exercise part of your daily routine. You may want to start with simple activities, such as walking, bicycling, or slow swimming. Try to be active 30 to 60 minutes every day. You do not need to do all 30 to 60 minutes all at once. For example, you can exercise 3 times a day for 10 or 20 minutes.  Moderate exercise is safe for most people, but it is always a good idea to talk to your doctor before starting an exercise program. 
· Keep moving. Tad Anum the lawn, work in the garden, or [employer]. Take the stairs instead of the elevator at work. · If you smoke, quit. People who smoke have an increased risk for heart attack, stroke, cancer, and other lung illnesses. Quitting is hard, but there are ways to boost your chance of quitting tobacco for good. ¨ Use nicotine gum, patches, or lozenges. ¨ Ask your doctor about stop-smoking programs and medicines. ¨ Keep trying. In addition to reducing your risk of diseases in the future, you will notice some benefits soon after you stop using tobacco. If you have shortness of breath or asthma symptoms, they will likely get better within a few weeks after you quit. · Limit how much alcohol you drink. Moderate amounts of alcohol (up to 2 drinks a day for men, 1 drink a day for women) are okay. But drinking too much can lead to liver problems, high blood pressure, and other health problems. Family health If you have a family, there are many things you can do together to improve your health. · Eat meals together as a family as often as possible. · Eat healthy foods. This includes fruits, vegetables, lean meats and dairy, and whole grains. · Include your family in your fitness plan. Most people think of activities such as jogging or tennis as the way to fitness, but there are many ways you and your family can be more active. Anything that makes you breathe hard and gets your heart pumping is exercise. Here are some tips: 
¨ Walk to do errands or to take your child to school or the bus. ¨ Go for a family bike ride after dinner instead of watching TV. Where can you learn more? Go to http://geraldo-samuel.info/. Enter Z023 in the search box to learn more about \"A Healthy Lifestyle: Care Instructions. \" Current as of: December 7, 2017 Content Version: 11.7 
© 9129-5706 Healthwise, Incorporated. Care instructions adapted under license by [employer] (which disclaims liability or warranty for this information). If you have questions about a medical condition or this instruction, always ask your healthcare professional. [address] any warranty or liability for your use of this information.

## 2018-07-17 NOTE — PROGRESS NOTES
[address] [place] [address]. [address]   [place] [address] [address]   [facility]    [phone]   [phone] Fax               No chief complaint on file. Current Outpatient Prescriptions   Medication Sig Dispense Refill    SUMAtriptan succinate (IMITREX STATDOSE PEN) 6 mg/0.5 mL kit 6 mg by SubCUTAneous route once as needed for Migraine for up to 1 dose. 1 Kit 5    SUMAtriptan succinate (ZEMBRACE SYMTOUCH) 3 mg/0.5 mL pnij 1 at HA onset and repeat in 1 hour x 1 prn  Max 4 in 24 hours 8 Syringe 6    eletriptan (RELPAX) 40 mg tablet TAKE ONE TABLET BY MOUTH AT ONSET OF HEADACHE. MAY REPEAT IN 2 HOURS IF NEEDED. **DO NOT TAKE MORE THAN 2 TABELTS PER DAY** 9 Tab 3    citalopram (CELEXA) 20 mg tablet       EPINEPHrine (EPIPEN) 0.3 mg/0.3 mL injection       INFLIXIMAB (REMICADE IV) by IntraVENous route every six (6) weeks.  azelastine-fluticasone (DYMISTA) 137-50 mcg/spray spry by Nasal route.  MOVANTIK 25 mg tab tablet       losartan (COZAAR) 50 mg tablet TAKE ONE TABLET BY MOUTH DAILY 90 Tab 0    EPINEPHrine (EPIPEN JR) 0.15 mg/0.3 mL (1:2,000) injection 0.15 mg by IntraMUSCular route once as needed.  albuterol (PROVENTIL VENTOLIN) 2.5 mg /3 mL (0.083 %) nebulizer solution 3 mL by Nebulization route every four (4) hours as needed for Wheezing. 1 Package 4    VENTOLIN HFA 90 mcg/actuation inhaler INHALE TWO PUFFS BY MOUTH EVERY 4 HOURS AS NEEDED 1 Inhaler 4    cyclobenzaprine (FLEXERIL) 10 mg tablet Take 1 Tab by mouth three (3) times daily as needed. 90 Tab 1    fluticasone (FLOVENT HFA) 110 mcg/actuation inhaler Take 2 Puffs by inhalation two (2) times a day. 1 Inhaler 5    ascorbic acid (VITAMIN C) 500 mg tablet Take 1,000 mg by mouth daily.  cholecalciferol, vitamin D3, (VITAMIN D3) 2,000 unit Tab Take 2 tablets by mouth daily.  cyanocobalamin (VITAMIN B-12) 1,000 mcg tablet Take 1,000 mcg by mouth daily.         B.infantis-B.ani-B.long-B.bifi (PROBIOTIC 4X) 10-15 mg Tab Take 2 tablets by mouth daily. Allergies   Allergen Reactions    Azithromycin Rash    Carafate [Sucralfate] Swelling    Cephalexin Hcl Rash    Enbrel [Etanercept] Other (comments)     Blisters at injection sites    Penicillin G Rash    Protonix [Pantoprazole] Swelling    Tamiflu [Oseltamivir Phosphate] Swelling     Social History   Substance Use Topics    Smoking status: Never Smoker    Smokeless tobacco: Never Used    Alcohol use Yes      Comment: socially     Patient returns today for follow-up migraine headaches and left occipital neuralgia. In April we did a left occipital nerve block. She previously had good response to a supraorbital nerve block as well. Since her last visit in April she reports good resolution of her left occipital pain. Having some supraorbital pain on the left. Still using about 9 Imitrex per month for her severe headaches i.e. migraines and has been effective. Examination  Visit Vitals    /69    Pulse 81    Temp 98.4 °F (36.9 °C) (Oral)    Resp 16    Ht 5' 6\" (1.676 m)    Wt 63 kg (139 lb)    SpO2 98%    BMI 22.44 kg/m2     Awake, alert and oriented. Dress and grooming are appropriate. Affect appropriate. No icterus. CN intact 2-12 without nystagmus. No pronation or drift. Resists fully in all 4 extrems. DTR symmetric in all 4 extremities. No ataxia. Steady gait. Impression/Plan  Migraine headaches intractable and will institute Aimovig. Discussed administration, potential side effects, potential benefits. Demonstrated the autoinjector today. Left-sided occipital neuralgia/supraorbital neuralgia continue watchful approach. Repeat blocks as needed. Follow-up in 11-12 weeks so that we can have her on the Telecardia for about 2 months to see how that is doing. Continue with Relpax as needed. Brandon Johnson MD    This note was created using voice recognition software.  Despite editing, there may be syntax errors. This note will not be viewable in [address]

## 2018-09-04 ENCOUNTER — OFFICE VISIT (OUTPATIENT)
Dept: PRIMARY CARE CLINIC | Age: 58
End: 2018-09-04

## 2018-09-04 VITALS
HEIGHT: 66 IN | RESPIRATION RATE: 17 BRPM | HEART RATE: 103 BPM | DIASTOLIC BLOOD PRESSURE: 88 MMHG | WEIGHT: 139.6 LBS | SYSTOLIC BLOOD PRESSURE: 132 MMHG | TEMPERATURE: 98.8 F | BODY MASS INDEX: 22.43 KG/M2 | OXYGEN SATURATION: 96 %

## 2018-09-04 DIAGNOSIS — N30.00 ACUTE CYSTITIS WITHOUT HEMATURIA: Primary | ICD-10-CM

## 2018-09-04 LAB
BILIRUB UR QL STRIP: NEGATIVE
GLUCOSE UR-MCNC: NEGATIVE MG/DL
KETONES P FAST UR STRIP-MCNC: NEGATIVE MG/DL
PH UR STRIP: 6 [PH] (ref 4.6–8)
PROT UR QL STRIP: NORMAL
SP GR UR STRIP: 1.03 (ref 1–1.03)
UA UROBILINOGEN AMB POC: NORMAL (ref 0.2–1)
URINALYSIS CLARITY POC: NORMAL
URINALYSIS COLOR POC: NORMAL
URINE BLOOD POC: NEGATIVE
URINE LEUKOCYTES POC: NORMAL
URINE NITRITES POC: NEGATIVE

## 2018-09-04 RX ORDER — CITALOPRAM 20 MG/1
TABLET, FILM COATED ORAL
COMMUNITY
Start: 2018-02-05 | End: 2018-09-04 | Stop reason: SDUPTHER

## 2018-09-04 RX ORDER — MYCOPHENOLATE MOFETIL 500 MG/1
TABLET ORAL
COMMUNITY
Start: 2018-02-08 | End: 2019-12-16

## 2018-09-04 RX ORDER — LOSARTAN POTASSIUM 50 MG/1
TABLET ORAL
COMMUNITY
Start: 2018-01-18 | End: 2019-01-29 | Stop reason: SDUPTHER

## 2018-09-04 RX ORDER — HYDROCODONE BITARTRATE AND ACETAMINOPHEN 5; 325 MG/1; MG/1
1 TABLET ORAL
COMMUNITY
End: 2019-04-18 | Stop reason: ALTCHOICE

## 2018-09-04 RX ORDER — ALBUTEROL SULFATE 90 UG/1
AEROSOL, METERED RESPIRATORY (INHALATION)
COMMUNITY
Start: 2018-01-18 | End: 2018-09-04 | Stop reason: SDUPTHER

## 2018-09-04 RX ORDER — AZELASTINE HYDROCHLORIDE, FLUTICASONE PROPIONATE 137; 50 UG/1; UG/1
SPRAY, METERED NASAL
COMMUNITY
Start: 2018-01-18 | End: 2018-09-04 | Stop reason: SDUPTHER

## 2018-09-04 RX ORDER — DICLOFENAC SODIUM 20 MG/G
SOLUTION TOPICAL
COMMUNITY
Start: 2018-04-19 | End: 2018-09-04

## 2018-09-04 RX ORDER — NITROFURANTOIN 25; 75 MG/1; MG/1
100 CAPSULE ORAL 2 TIMES DAILY
Qty: 14 CAP | Refills: 0 | Status: SHIPPED | OUTPATIENT
Start: 2018-09-04 | End: 2018-09-11

## 2018-09-04 RX ORDER — CYCLOBENZAPRINE HCL 10 MG
10 TABLET ORAL
COMMUNITY
End: 2018-09-04 | Stop reason: SDUPTHER

## 2018-09-04 RX ORDER — FLUTICASONE PROPIONATE 110 UG/1
AEROSOL, METERED RESPIRATORY (INHALATION)
COMMUNITY
Start: 2018-01-18 | End: 2019-01-29 | Stop reason: SDUPTHER

## 2018-09-04 RX ORDER — ELETRIPTAN HYDROBROMIDE 40 MG/1
TABLET, FILM COATED ORAL
COMMUNITY
Start: 2018-03-22 | End: 2018-09-04 | Stop reason: SDUPTHER

## 2018-09-04 NOTE — PROGRESS NOTES
Chief Complaint Patient presents with  Urinary Burning C/o urinary burning,urinary frequency and pressure after voiding since Friday night. This note will not be viewable in [address]

## 2018-09-04 NOTE — PROGRESS NOTES
Subjective:  
 
Laurie Burnett is a 61 y.o. female who complains of dysuria, frequency for 3 days. Patient denies flank pain, nausea, vomiting, fever, abdominal pain, unusual vaginal discharge. Patient does have a history of recurrent UTI. Patient does not have a history of pyelonephritis. Pt endorses history of recurrent UTI and was seen by Dr. Kaylie Cuellar, Urology 2017. She was started on cranberry tabs and has done well since that time. Past Medical History:  
Diagnosis Date  Anxiety 10/25/2012  Fatigue  Headache   
 HTN (hypertension) 10/25/2012  Insomnia 10/25/2012  Lumbar back pain 10/25/2012 Steroid injections,  Dr. Murray   RA (rheumatoid arthritis) ([facility] [address] 10/25/2012  
 Skipped beats Past Surgical History:  
Procedure Laterality Date  HX BREAST REDUCTION    
 HX  SECTION    
 2  
 HX HEENT    
 deviated septum  HX ORTHOPAEDIC    
 R metatarsal joint fusion  NEUROLOGICAL PROCEDURE UNLISTED    
 lumbar radio frequency neurotomy Allergies Allergen Reactions  Azithromycin Rash  Carafate [Sucralfate] Swelling  Cephalexin Hcl Rash  Enbrel [Etanercept] Other (comments) Blisters at injection sites  Penicillin G Rash  Protonix [Pantoprazole] Swelling  Tamiflu [Oseltamivir Phosphate] Swelling Review of Systems Pertinent items are noted in HPI. Objective:  
 
Visit Vitals  /88 (BP 1 Location: Left arm, BP Patient Position: Sitting)  Pulse (!) 103  Temp 98.8 °F (37.1 °C) (Oral)  Resp 17  Ht 5' 6\" (1.676 m)  Wt 139 lb 9.6 oz (63.3 kg)  SpO2 96%  BMI 22.53 kg/m2 General:  alert, cooperative, no distress Abdomen: soft, nontender, nondistended, no masses or organomegaly. Back:  CVA tenderness absent :  defer exam  
 
Laboratory:  
Urine dipstick shows Results for orders placed or performed in visit on 18 AMB POC URINALYSIS DIP STICK AUTO W/O MICRO Result Value Ref Range Color (UA POC) Dark Yellow Clarity (UA POC) Slightly Cloudy Glucose (UA POC) Negative Negative Bilirubin (UA POC) Negative Negative Ketones (UA POC) Negative Negative Specific gravity (UA POC) 1.030 1.001 - 1.035 Blood (UA POC) Negative Negative pH (UA POC) 6.0 4.6 - 8.0 Protein (UA POC) Trace Negative Urobilinogen (UA POC) 0.2 mg/dL 0.2 - 1 Nitrites (UA POC) Negative Negative Leukocyte esterase (UA POC) 1+ Negative Assessment/Plan: ICD-10-CM ICD-9-CM 1. Acute cystitis without hematuria N30.00 595.0 CULTURE, URINE  
   AMB POC URINALYSIS DIP STICK AUTO W/O MICRO 1. nitrofurantoin 2. Maintain adequate hydration 3. Urine cx 4. Follow up if symptoms not improving, and prn. Angy Sessions, NP This note will not be viewable in [address]

## 2018-09-04 NOTE — PATIENT INSTRUCTIONS
Urine Culture: About This Test 
What is it? A urine culture is a test to find germs (such as bacteria) that can cause an infection. A sample of urine is added to a substance that promotes the growth of germs. If no germs grow, the culture is negative. If germs that can cause infection grow, the culture is positive. The type of germ may be identified using a microscope or chemical tests. Why is this test done? A urine culture may be done to: · Find the cause of a urinary tract infection (UTI). · Make decisions about the best treatment for a UTI. · Find out whether treatment for a UTI worked. How can you prepare for the test? 
You don't need to do anything before you have the test. If you are taking or have recently taken antibiotics, tell your doctor. What happens before the test? 
You will need to drink enough fluids and avoid urinating so that you will be able to collect a urine sample. What happens during the test? 
You will be asked to collect a clean-catch midstream urine sample for testing. The first urine of the day is best because bacteria levels will be higher. · Wash your hands before collecting the urine. · If the container has a lid, remove the lid of the container and set it down with the inner surface up. · Clean the area around your penis or vagina. · Begin urinating into the toilet or urinal. 
· After the urine has flowed for several seconds, place the collection container in the stream and collect about 2 ounces (a quarter cup) of this \"midstream\" urine without stopping the flow. · Don't touch the rim of the container to your genital area. · Finish urinating into the toilet or urinal. 
· Carefully replace the lid on the container. · Wash your hands. How long does the test take? · The test will take a few minutes. What happens after the test? 
· You will probably be able to go home right away. The results of a urine culture are usually available in 1 to 3 days. · You can go back to your usual activities right away. Follow-up care is a key part of your treatment and safety. Be sure to make and go to all appointments, and call your doctor if you are having problems. It's also a good idea to keep a list of the medicines you take. Ask your doctor when you can expect to have your test results. Where can you learn more? Go to http://geraldo-samuel.info/. Enter F051 in the search box to learn more about \"Urine Culture: About This Test.\" Current as of: October 9, 2017 Content Version: 11.7 © 0607-2258 [employer]. Care instructions adapted under license by [employer] (which disclaims liability or warranty for this information). If you have questions about a medical condition or this instruction, always ask your healthcare professional. [address] any warranty or liability for your use of this information.

## 2018-09-06 LAB
BACTERIA UR CULT: ABNORMAL
BACTERIA UR CULT: ABNORMAL

## 2018-09-07 ENCOUNTER — TELEPHONE (OUTPATIENT)
Dept: PRIMARY CARE CLINIC | Age: 58
End: 2018-09-07

## 2018-09-07 RX ORDER — CEFDINIR 300 MG/1
300 CAPSULE ORAL 2 TIMES DAILY
Qty: 14 CAP | Refills: 0 | Status: SHIPPED | OUTPATIENT
Start: 2018-09-07 | End: 2018-09-14

## 2018-09-07 NOTE — TELEPHONE ENCOUNTER
Called and left voicemail for parent to return call regarding lab results, still no return call as of 9/7/18 at [facility]

## 2018-09-07 NOTE — PROGRESS NOTES
Please inform pt that the urine culture confirms UTI and macrobid is resistant. Needs to stop macrobid and I will need to send in another antibiotic for her. Please ask pt if she can take Augmentin (amoxicillin based) Thanks.

## 2018-09-07 NOTE — TELEPHONE ENCOUNTER
----- Message from Dana Esquivel LPN sent at 1/1/0569  6:07 PM EDT -----  Called and spoke with patient, verified name and date of birth, advised that urine culture confirms uti and macrobid is resistant, advised to stop macrobid and pt will need to start new antibiotic, pt advised  she was unable to take augmentin due to allergy, advised after speaking with Sobia Nunn we would send over cipro for her, advised pt that if she starts developing any muscle tenderness or pain that she needs to stop taking medication immediately and to contact office.  pt verbalized understanding of all information and had no further questions, verified pharmacy as [facility] in [place]

## 2018-09-07 NOTE — PROGRESS NOTES
Called and spoke with patient, verified name and date of birth, advised that urine culture confirms uti and macrobid is resistant, advised to stop macrobid and pt will need to start new antibiotic, pt advised  she was unable to take augmentin due to allergy, advised after speaking with Ponce Neighbor we would send over cipro for her, advised pt that if she starts developing any muscle tenderness or pain that she needs to stop taking medication immediately and to contact office.  pt verbalized understanding of all information and had no further questions, verified pharmacy as [facility] in [place]

## 2018-09-07 NOTE — TELEPHONE ENCOUNTER
----- Message from Zari Amor LPN sent at 6/6/0285  6:07 PM EDT -----  Called and spoke with patient, verified name and date of birth, advised that urine culture confirms uti and macrobid is resistant, advised to stop macrobid and pt will need to start new antibiotic, pt advised  she was unable to take augmentin due to allergy, advised after speaking with [place] we would send over cipro for her, advised pt that if she starts developing any muscle tenderness or pain that she needs to stop taking medication immediately and to contact office.  pt verbalized understanding of all information and had no further questions, verified pharmacy as [facility] in [place]

## 2018-09-24 ENCOUNTER — OFFICE VISIT (OUTPATIENT)
Dept: PRIMARY CARE CLINIC | Age: 58
End: 2018-09-24

## 2018-09-24 VITALS
WEIGHT: 141.3 LBS | OXYGEN SATURATION: 98 % | SYSTOLIC BLOOD PRESSURE: 133 MMHG | HEIGHT: 66 IN | DIASTOLIC BLOOD PRESSURE: 90 MMHG | BODY MASS INDEX: 22.71 KG/M2 | TEMPERATURE: 98.3 F | HEART RATE: 74 BPM | RESPIRATION RATE: 17 BRPM

## 2018-09-24 DIAGNOSIS — I10 ESSENTIAL HYPERTENSION: ICD-10-CM

## 2018-09-24 DIAGNOSIS — N30.01 ACUTE CYSTITIS WITH HEMATURIA: Primary | ICD-10-CM

## 2018-09-24 LAB
BILIRUB UR QL STRIP: NEGATIVE
GLUCOSE UR-MCNC: NEGATIVE MG/DL
KETONES P FAST UR STRIP-MCNC: NEGATIVE MG/DL
PH UR STRIP: 5.5 [PH] (ref 4.6–8)
PROT UR QL STRIP: NEGATIVE
SP GR UR STRIP: 1.02 (ref 1–1.03)
UA UROBILINOGEN AMB POC: NORMAL (ref 0.2–1)
URINALYSIS CLARITY POC: CLEAR
URINALYSIS COLOR POC: YELLOW
URINE BLOOD POC: NORMAL
URINE LEUKOCYTES POC: NORMAL
URINE NITRITES POC: NEGATIVE

## 2018-09-24 RX ORDER — CEFDINIR 300 MG/1
300 CAPSULE ORAL 2 TIMES DAILY
Qty: 14 CAP | Refills: 0 | Status: SHIPPED | OUTPATIENT
Start: 2018-09-24 | End: 2018-10-01

## 2018-09-24 NOTE — PROGRESS NOTES
Chief Complaint Patient presents with  Urinary Burning  
pt c/o recurrent urinary burning and frequency x 3-4 days, pt states she took old script of macrobid to help with sx relief. This note will not be viewable in [address]

## 2018-09-24 NOTE — PROGRESS NOTES
Subjective:  
 
Gilda Copeland is a 60 y.o. female who complains of dysuria, frequency, urgency for 1-2 days. Symptoms include mild low back pain (L > R) but has history of this. Patient denies nausea, vomiting, fever, abdominal pain, unusual vaginal discharge. Patient does have a history of recurrent UTI. Patient does have a history of pyelonephritis. Pt has seen Dr. Milton Goodpasture, Urogynecology in past for recurrent UTI. Takes cranberry tabs. Pt recently had UTI  here. Initially was treated with macrobid but culture was resistant. She was switched to cipro which seemed to improve her symptoms. Pt did take macrobid x 2 doses over the weekend. Past Medical History:  
Diagnosis Date  Anxiety 10/25/2012  Fatigue  Headache   
 HTN (hypertension) 10/25/2012  Insomnia 10/25/2012  Lumbar back pain 10/25/2012 Steroid injections,  Dr. Feroz Chen  [facility]  
 Skipped beats Past Surgical History:  
Procedure Laterality Date  HX BREAST REDUCTION    
 HX  SECTION    
 2  
 HX HEENT    
 deviated septum  HX ORTHOPAEDIC    
 R metatarsal joint fusion  NEUROLOGICAL PROCEDURE UNLISTED    
 lumbar radio frequency neurotomy Allergies Allergen Reactions  Azithromycin Rash  Carafate [Sucralfate] Swelling  Cephalexin Hcl Rash  Enbrel [Etanercept] Other (comments) Blisters at injection sites  Penicillin G Rash  Protonix [Pantoprazole] Swelling  Tamiflu [Oseltamivir Phosphate] Swelling Review of Systems Pertinent items are noted in HPI. Objective:  
 
Visit Vitals  /90 (BP 1 Location: Left arm, BP Patient Position: Sitting)  Pulse 74  Temp 98.3 °F (36.8 °C) (Oral)  Resp 17  Ht 5' 6\" (1.676 m)  Wt 141 lb 4.8 oz (64.1 kg)  SpO2 98%  BMI 22.81 kg/m2 General:  alert, cooperative, no distress Abdomen: soft, nontender, nondistended, no masses or organomegaly. Back: CVA tenderness absent :  defer exam  
 
Laboratory:  
Urine dipstick shows Results for orders placed or performed in visit on 09/24/18 AMB POC URINALYSIS DIP STICK AUTO W/O MICRO Result Value Ref Range Color (UA POC) Yellow Clarity (UA POC) Clear Glucose (UA POC) Negative Negative Bilirubin (UA POC) Negative Negative Ketones (UA POC) Negative Negative Specific gravity (UA POC) 1.025 1.001 - 1.035 Blood (UA POC) 2+ Negative pH (UA POC) 5.5 4.6 - 8.0 Protein (UA POC) Negative Negative Urobilinogen (UA POC) 0.2 mg/dL 0.2 - 1 Nitrites (UA POC) Negative Negative Leukocyte esterase (UA POC) 1+ Negative Assessment/Plan: ICD-10-CM ICD-9-CM 1. Acute cystitis with hematuria N30.01 595.0 CULTURE, URINE  
   AMB POC URINALYSIS DIP STICK AUTO W/O MICRO 2. Essential hypertension I10 401.9   
-Pt reports compliance with antihypertensive meds. Continue to monitor prn. F/u with PCP if remains > 140/90. Orders Placed This Encounter  CULTURE, URINE  AMB POC URINALYSIS DIP STICK AUTO W/O MICRO  cefdinir (OMNICEF) 300 mg capsule Pt has multiple drug allergies and last C&S resistant to nitrofurantoin. Unable to use bactrim due to interaction with losartan. Will try cefdinir but cautioned about SE of rash. Increase hydration. Follow up if sx's not improving and prn. Gabriel Noyola NP This note will not be viewable in [address]

## 2018-09-24 NOTE — PATIENT INSTRUCTIONS
Urinary Tract Infection in Women: Care Instructions Your Care Instructions A urinary tract infection, or UTI, is a general term for an infection anywhere between the kidneys and the urethra (where urine comes out). Most UTIs are bladder infections. They often cause pain or burning when you urinate. UTIs are caused by bacteria and can be cured with antibiotics. Be sure to complete your treatment so that the infection goes away. Follow-up care is a key part of your treatment and safety. Be sure to make and go to all appointments, and call your doctor if you are having problems. It's also a good idea to know your test results and keep a list of the medicines you take. How can you care for yourself at home? · Take your antibiotics as directed. Do not stop taking them just because you feel better. You need to take the full course of antibiotics. · Drink extra water and other fluids for the next day or two. This may help wash out the bacteria that are causing the infection. (If you have kidney, heart, or liver disease and have to limit fluids, talk with your doctor before you increase your fluid intake.) · Avoid drinks that are carbonated or have caffeine. They can irritate the bladder. · Urinate often. Try to empty your bladder each time. · To relieve pain, take a hot bath or lay a heating pad set on low over your lower belly or genital area. Never go to sleep with a heating pad in place. To prevent UTIs · Drink plenty of water each day. This helps you urinate often, which clears bacteria from your system. (If you have kidney, heart, or liver disease and have to limit fluids, talk with your doctor before you increase your fluid intake.) · Urinate when you need to. · Urinate right after you have sex. · Change sanitary pads often. · Avoid douches, bubble baths, feminine hygiene sprays, and other feminine hygiene products that have deodorants. · After going to the bathroom, wipe from front to back. When should you call for help? Call your doctor now or seek immediate medical care if: 
  · Symptoms such as fever, chills, nausea, or vomiting get worse or appear for the first time.  
  · You have new pain in your back just below your rib cage. This is called flank pain.  
  · There is new blood or pus in your urine.  
  · You have any problems with your antibiotic medicine.  
 Watch closely for changes in your health, and be sure to contact your doctor if: 
  · You are not getting better after taking an antibiotic for 2 days.  
  · Your symptoms go away but then come back. Where can you learn more? Go to http://geraldo-samuel.info/. Enter E756 in the search box to learn more about \"Urinary Tract Infection in Women: Care Instructions. \" Current as of: May 12, 2017 Content Version: 11.7 © 7883-7215 [employer]. Care instructions adapted under license by [employer] (which disclaims liability or warranty for this information). If you have questions about a medical condition or this instruction, always ask your healthcare professional. [address] any warranty or liability for your use of this information.

## 2018-09-26 LAB — BACTERIA UR CULT: ABNORMAL

## 2018-09-28 ENCOUNTER — TELEPHONE (OUTPATIENT)
Dept: FAMILY MEDICINE CLINIC | Age: 58
End: 2018-09-28

## 2018-09-28 NOTE — TELEPHONE ENCOUNTER
----- Message from Minna Perez NP sent at 9/28/2018  9:00 AM EDT -----  Please inform pt that urine culture confirms UTI and antibiotic prescribed should be effective. Recommend she compete the course as prescribed. Thanks.

## 2018-09-28 NOTE — PROGRESS NOTES
Please inform pt that urine culture confirms UTI and antibiotic prescribed should be effective. Recommend she compete the course as prescribed. Thanks.

## 2018-11-07 NOTE — TELEPHONE ENCOUNTER
----- Message from Aletha Amanda sent at 11/7/2018 10:51 AM EST -----  Regarding: Dr. Tushar Blanchard  The patient is requesting that the doctor calls in a new prescription for Amovig into the pharmacy.  ([address] [facility] on file) [phone]

## 2018-11-15 ENCOUNTER — TELEPHONE (OUTPATIENT)
Dept: NEUROLOGY | Age: 58
End: 2018-11-15

## 2018-11-15 NOTE — TELEPHONE ENCOUNTER
Received [facility] denial letter regarding [facility] 70mg/ml #1/30 days  Reason: patient do not meet requirements     PA case closed   Clinical staff informed

## 2018-12-17 ENCOUNTER — HOSPITAL ENCOUNTER (OUTPATIENT)
Dept: GENERAL RADIOLOGY | Age: 58
Discharge: HOME | End: 2018-12-17
Payer: COMMERCIAL

## 2018-12-17 DIAGNOSIS — M25.559 HIP JOINT PAIN: ICD-10-CM

## 2018-12-17 DIAGNOSIS — M25.552 LEFT HIP PAIN: ICD-10-CM

## 2018-12-17 DIAGNOSIS — M25.551 RIGHT HIP PAIN: ICD-10-CM

## 2018-12-17 PROCEDURE — 73521 X-RAY EXAM HIPS BI 2 VIEWS: CPT

## 2019-01-29 ENCOUNTER — OFFICE VISIT (OUTPATIENT)
Dept: NEUROLOGY | Age: 59
End: 2019-01-29

## 2019-01-29 VITALS
WEIGHT: 142 LBS | SYSTOLIC BLOOD PRESSURE: 114 MMHG | RESPIRATION RATE: 16 BRPM | OXYGEN SATURATION: 98 % | HEIGHT: 66 IN | BODY MASS INDEX: 22.82 KG/M2 | HEART RATE: 79 BPM | DIASTOLIC BLOOD PRESSURE: 82 MMHG

## 2019-01-29 DIAGNOSIS — G43.001 MIGRAINE WITHOUT AURA AND WITH STATUS MIGRAINOSUS, NOT INTRACTABLE: ICD-10-CM

## 2019-01-29 RX ORDER — ELETRIPTAN HYDROBROMIDE 40 MG/1
TABLET, FILM COATED ORAL
Qty: 9 TAB | Refills: 11 | Status: SHIPPED | OUTPATIENT
Start: 2019-01-29 | End: 2019-01-29 | Stop reason: SDUPTHER

## 2019-01-29 RX ORDER — ELETRIPTAN HYDROBROMIDE 40 MG/1
TABLET, FILM COATED ORAL
Qty: 9 TAB | Refills: 11 | Status: SHIPPED | OUTPATIENT
Start: 2019-01-29 | End: 2019-12-16 | Stop reason: SDUPTHER

## 2019-01-29 NOTE — PROGRESS NOTES
Chief Complaint Patient presents with  Migraine f/u, doing better with Aimovig, occurring about 6-7 times per mo

## 2019-01-29 NOTE — PROGRESS NOTES
[facility] and [address] at [facility] at [address] [facility] [address] [facility] [place], [address] [facility] [address] [facility], [address] [place] [address] Chief Complaint Patient presents with  Migraine f/u, doing better with Aimovig, occurring about 6-7 times per mo Current Outpatient Medications Medication Sig Dispense Refill  erenumab-aooe (AIMOVIG AUTOINJECTOR) 70 mg/mL atIn 70 mg by SubCUTAneous route every month. 1 Syringe 5  
 HYDROcodone-acetaminophen (NORCO) 5-325 mg per tablet Take 1 Tab by mouth.  mycophenolate (CELLCEPT) 500 mg tablet  eletriptan (RELPAX) 40 mg tablet TAKE ONE TABLET BY MOUTH AT ONSET OF HEADACHE. MAY REPEAT IN 2 HOURS IF NEEDED. **DO NOT TAKE MORE THAN 2 TABELTS PER DAY** 9 Tab 11  
 SUMAtriptan succinate (ZEMBRACE SYMTOUCH) 3 mg/0.5 mL pnij 1 at HA onset and repeat in 1 hour x 1 prn  Max 4 in 24 hours 8 Syringe 6  citalopram (CELEXA) 20 mg tablet  INFLIXIMAB (REMICADE IV) by IntraVENous route every six (6) weeks.  azelastine-fluticasone (DYMISTA) 137-50 mcg/spray spry by Nasal route.  losartan (COZAAR) 50 mg tablet TAKE ONE TABLET BY MOUTH DAILY (Patient taking differently: TAKE 1/2 TABLET BY MOUTH DAILY) 90 Tab 0  
 VENTOLIN HFA 90 mcg/actuation inhaler INHALE TWO PUFFS BY MOUTH EVERY 4 HOURS AS NEEDED 1 Inhaler 4  cyclobenzaprine (FLEXERIL) 10 mg tablet Take 1 Tab by mouth three (3) times daily as needed. 90 Tab 1  
 fluticasone (FLOVENT HFA) 110 mcg/actuation inhaler Take 2 Puffs by inhalation two (2) times a day. 1 Inhaler 5  
 ascorbic acid (VITAMIN C) 500 mg tablet Take 1,000 mg by mouth daily.  cholecalciferol, vitamin D3, (VITAMIN D3) 2,000 unit Tab Take 2 tablets by mouth daily.  cyanocobalamin (VITAMIN B-12) 1,000 mcg tablet Take 1,000 mcg by mouth daily.  B.infantis-B.ani-B.long-B.bifi (PROBIOTIC 4X) 10-15 mg Tab Take 2 tablets by mouth daily.  albuterol (PROVENTIL VENTOLIN) 2.5 mg /3 mL (0.083 %) nebulizer solution 3 mL by Nebulization route every four (4) hours as needed for Wheezing. (Patient not taking: Reported on 1/29/2019) 1 Package 4 Allergies Allergen Reactions  Azithromycin Rash  Carafate [Sucralfate] Swelling  Cephalexin Hcl Rash  Enbrel [Etanercept] Other (comments) Blisters at injection sites  Penicillin G Rash  Protonix [Pantoprazole] Swelling  Tamiflu [Oseltamivir Phosphate] Swelling Social History Tobacco Use  Smoking status: Never Smoker  Smokeless tobacco: Never Used Substance Use Topics  Alcohol use: Yes Comment: socially  Drug use: No  
 
Patient returns today for follow-up migraine headaches. At her previous visit prior to institution of [facility] she was having  greater than 10 headaches per month. She indicates she is having about 6-7. Relpax is effective. She is happy with how they have decreased but she still is having them quite frequently. No focal deficits. No new complaint. No focality. Tolerating her medicines without difficulty. Examination Visit Vitals /82 (BP 1 Location: Left arm, BP Patient Position: Sitting) Pulse 79 Resp 16 Ht 5' 6\" (1.676 m) Wt 64.4 kg (142 lb) SpO2 98% BMI 22.92 kg/m² Very pleasant lady. Awake alert oriented and conversant. Normal speech-language cognition. Intact cranial nerves XII. Steady gait Impression/Plan Migraine HA better with Aimovig but still quite frequent. At least now she is not using all of her abortive medicines per month as she would previously run out and her headaches have improved. We discussed options. We will go ahead and increase Aimovig to a dose of 140 mg once monthly. We will see how that does over the next 3 months. Continue with Relpax. Follow in 3 months Deedee Engel MD 
 This note was created using voice recognition software. Despite editing, there may be syntax errors. This note will not be viewable in [address]

## 2019-02-08 ENCOUNTER — OFFICE VISIT (OUTPATIENT)
Dept: PRIMARY CARE CLINIC | Age: 59
End: 2019-02-08

## 2019-02-08 VITALS
OXYGEN SATURATION: 97 % | HEIGHT: 66 IN | SYSTOLIC BLOOD PRESSURE: 129 MMHG | TEMPERATURE: 98.2 F | BODY MASS INDEX: 22.4 KG/M2 | DIASTOLIC BLOOD PRESSURE: 80 MMHG | HEART RATE: 98 BPM | RESPIRATION RATE: 17 BRPM | WEIGHT: 139.4 LBS

## 2019-02-08 DIAGNOSIS — N30.01 ACUTE CYSTITIS WITH HEMATURIA: ICD-10-CM

## 2019-02-08 LAB
BILIRUB UR QL STRIP: NEGATIVE
GLUCOSE UR-MCNC: NEGATIVE MG/DL
KETONES P FAST UR STRIP-MCNC: NORMAL MG/DL
PH UR STRIP: 7 [PH] (ref 4.6–8)
PROT UR QL STRIP: NORMAL
SP GR UR STRIP: 1.03 (ref 1–1.03)
UA UROBILINOGEN AMB POC: NORMAL (ref 0.2–1)
URINALYSIS CLARITY POC: CLEAR
URINALYSIS COLOR POC: YELLOW
URINE BLOOD POC: NORMAL
URINE LEUKOCYTES POC: NORMAL
URINE NITRITES POC: NEGATIVE

## 2019-02-08 RX ORDER — SULFAMETHOXAZOLE AND TRIMETHOPRIM 800; 160 MG/1; MG/1
1 TABLET ORAL 2 TIMES DAILY
Qty: 14 TAB | Refills: 0 | Status: SHIPPED | OUTPATIENT
Start: 2019-02-08 | End: 2019-02-15

## 2019-02-08 RX ORDER — TITANIUM DIOXIDE, OCTINOXATE, ZINC OXIDE 4.61; 1.6; .78 G/40ML; G/40ML; G/40ML
CREAM TOPICAL
COMMUNITY
End: 2019-09-16

## 2019-02-08 RX ORDER — CIPROFLOXACIN 500 MG/1
500 TABLET ORAL 2 TIMES DAILY
Qty: 14 TAB | Refills: 0 | Status: CANCELLED | OUTPATIENT
Start: 2019-02-08 | End: 2019-02-15

## 2019-02-08 NOTE — PROGRESS NOTES
Chief Complaint Patient presents with  Urinary Frequency  Urinary Burning Symptoms started on Thursday.

## 2019-02-08 NOTE — PROGRESS NOTES
Subjective:  
 
Sol Friedman is a 60 y.o. female who complains of dysuria, frequency for 2 days. Patient denies flank pain, nausea, vomiting, fever, abdominal pain, unusual vaginal discharge. Patient does have a history of recurrent UTI. Patient does not have a history of pyelonephritis. Pt had several UTI's in 2018 and treated by Dr. Gordon [place] - Uro/GYN. After several courses of antibiotics, one finally worked. Pt not sure which one. .. Taking cranberry tabs. Past Medical History:  
Diagnosis Date  Anxiety 10/25/2012  Fatigue  Headache   
 HTN (hypertension) 10/25/2012  Insomnia 10/25/2012  Lumbar back pain 10/25/2012 Steroid injections,  Dr. Linda Perez  [facility]  
 Skipped beats Past Surgical History:  
Procedure Laterality Date  HX BREAST REDUCTION    
 HX  SECTION    
 2  
 HX HEENT    
 deviated septum  HX ORTHOPAEDIC    
 R metatarsal joint fusion  NEUROLOGICAL PROCEDURE UNLISTED    
 lumbar radio frequency neurotomy Allergies Allergen Reactions  Azithromycin Rash  Carafate [Sucralfate] Swelling  Cephalexin Hcl Rash  Enbrel [Etanercept] Other (comments) Blisters at injection sites  Penicillin G Rash  Protonix [Pantoprazole] Swelling  Tamiflu [Oseltamivir Phosphate] Swelling Review of Systems Pertinent items are noted in HPI. Objective:  
 
Visit Vitals /80 Pulse 98 Temp 98.2 °F (36.8 °C) (Oral) Resp 17 Ht 5' 6\" (1.676 m) Wt 139 lb 6.4 oz (63.2 kg) SpO2 97% BMI 22.50 kg/m² General:  alert, cooperative, no distress Abdomen: soft, nontender, nondistended, no masses or organomegaly. Back:  CVA tenderness absent :  defer exam  
 
Laboratory:  
Urine dipstick shows Results for orders placed or performed in visit on 19 AMB POC URINALYSIS DIP STICK AUTO W/O MICRO Result Value Ref Range Color (UA POC) Yellow Clarity (UA POC) Clear Glucose (UA POC) Negative Negative Bilirubin (UA POC) Negative Negative Ketones (UA POC) Trace Negative Specific gravity (UA POC) 1.030 1.001 - 1.035 Blood (UA POC) 3+ Negative pH (UA POC) 7.0 4.6 - 8.0 Protein (UA POC) 2+ Negative Urobilinogen (UA POC) 0.2 mg/dL 0.2 - 1 Nitrites (UA POC) Negative Negative Leukocyte esterase (UA POC) 1+ Negative Assessment/Plan: ICD-10-CM ICD-9-CM 1. Acute cystitis with hematuria N30.01 595.0 AMB POC URINALYSIS DIP STICK AUTO W/O MICRO  
   CULTURE, URINE  
   CANCELED: CULTURE, STREP THROAT  
 
-Nurse called office of Dr. Milly Gaitan (unable to get through to [facility]) and pt's last antibiotic was Bactrim. Will send in Bactrim. 1. TMP/SMX 2. Maintain adequate hydration 3. May use OTC pyridium as desired, which will turn urine orange/red color 4. Follow up if symptoms not improving, and prn. Live Gomez, NP This note will not be viewable in [address]

## 2019-02-08 NOTE — PATIENT INSTRUCTIONS
Urinary Tract Infection in Women: Care Instructions Your Care Instructions A urinary tract infection, or UTI, is a general term for an infection anywhere between the kidneys and the urethra (where urine comes out). Most UTIs are bladder infections. They often cause pain or burning when you urinate. UTIs are caused by bacteria and can be cured with antibiotics. Be sure to complete your treatment so that the infection goes away. Follow-up care is a key part of your treatment and safety. Be sure to make and go to all appointments, and call your doctor if you are having problems. It's also a good idea to know your test results and keep a list of the medicines you take. How can you care for yourself at home? · Take your antibiotics as directed. Do not stop taking them just because you feel better. You need to take the full course of antibiotics. · Drink extra water and other fluids for the next day or two. This may help wash out the bacteria that are causing the infection. (If you have kidney, heart, or liver disease and have to limit fluids, talk with your doctor before you increase your fluid intake.) · Avoid drinks that are carbonated or have caffeine. They can irritate the bladder. · Urinate often. Try to empty your bladder each time. · To relieve pain, take a hot bath or lay a heating pad set on low over your lower belly or genital area. Never go to sleep with a heating pad in place. To prevent UTIs · Drink plenty of water each day. This helps you urinate often, which clears bacteria from your system. (If you have kidney, heart, or liver disease and have to limit fluids, talk with your doctor before you increase your fluid intake.) · Urinate when you need to. · Urinate right after you have sex. · Change sanitary pads often. · Avoid douches, bubble baths, feminine hygiene sprays, and other feminine hygiene products that have deodorants. · After going to the bathroom, wipe from front to back. When should you call for help? Call your doctor now or seek immediate medical care if: 
  · Symptoms such as fever, chills, nausea, or vomiting get worse or appear for the first time.  
  · You have new pain in your back just below your rib cage. This is called flank pain.  
  · There is new blood or pus in your urine.  
  · You have any problems with your antibiotic medicine.  
 Watch closely for changes in your health, and be sure to contact your doctor if: 
  · You are not getting better after taking an antibiotic for 2 days.  
  · Your symptoms go away but then come back. Where can you learn more? Go to http://geraldo-samuel.info/. Enter D748 in the search box to learn more about \"Urinary Tract Infection in Women: Care Instructions. \" Current as of: March 20, 2018 Content Version: 11.9 © 7860-2358 [employer]. Care instructions adapted under license by [employer] (which disclaims liability or warranty for this information). If you have questions about a medical condition or this instruction, always ask your healthcare professional. [address] any warranty or liability for your use of this information.

## 2019-02-12 LAB — BACTERIA UR CULT: ABNORMAL

## 2019-02-12 NOTE — PROGRESS NOTES
Please inform pt that urine culture confirms UTI and Bactrim should be effective. Recommend she complete the course of antibiotics as prescribed. Thanks.

## 2019-02-13 NOTE — PROGRESS NOTES
Called and spoke with pt, advised of urine culture results, pt verbalized understanding of all information and had no further questions at this time

## 2019-03-01 ENCOUNTER — TELEPHONE (OUTPATIENT)
Dept: NEUROLOGY | Age: 59
End: 2019-03-01

## 2019-03-01 NOTE — TELEPHONE ENCOUNTER
Submitted PA for AIMOVIG. Per request formulary is Ajovy or Emgality. Explained pt has been using med with HA reduction. Will have to see what amena says.     PA Pending  Key# [id number]

## 2019-04-10 ENCOUNTER — TELEPHONE (OUTPATIENT)
Dept: NEUROLOGY | Age: 59
End: 2019-04-10

## 2019-04-10 DIAGNOSIS — G43.001 MIGRAINE WITHOUT AURA AND WITH STATUS MIGRAINOSUS, NOT INTRACTABLE: Primary | ICD-10-CM

## 2019-04-10 NOTE — TELEPHONE ENCOUNTER
Received fax from [address] [facility] advising that [place] is denied. Pt needs to try Ajovy or Emgality. Denial scanned to chart.

## 2019-04-18 ENCOUNTER — OFFICE VISIT (OUTPATIENT)
Dept: PRIMARY CARE CLINIC | Age: 59
End: 2019-04-18

## 2019-04-18 VITALS
BODY MASS INDEX: 22.98 KG/M2 | HEART RATE: 79 BPM | HEIGHT: 66 IN | DIASTOLIC BLOOD PRESSURE: 84 MMHG | SYSTOLIC BLOOD PRESSURE: 122 MMHG | WEIGHT: 143 LBS | TEMPERATURE: 98.2 F | OXYGEN SATURATION: 97 % | RESPIRATION RATE: 18 BRPM

## 2019-04-18 DIAGNOSIS — N30.01 ACUTE CYSTITIS WITH HEMATURIA: Primary | ICD-10-CM

## 2019-04-18 LAB
BILIRUB UR QL STRIP: NEGATIVE
GLUCOSE UR-MCNC: NEGATIVE MG/DL
KETONES P FAST UR STRIP-MCNC: NEGATIVE MG/DL
PH UR STRIP: 6 [PH] (ref 4.6–8)
PROT UR QL STRIP: NORMAL
SP GR UR STRIP: 1.02 (ref 1–1.03)
UA UROBILINOGEN AMB POC: NORMAL (ref 0.2–1)
URINALYSIS CLARITY POC: NORMAL
URINALYSIS COLOR POC: NORMAL
URINE BLOOD POC: NORMAL
URINE LEUKOCYTES POC: NORMAL
URINE NITRITES POC: POSITIVE

## 2019-04-18 RX ORDER — SULFAMETHOXAZOLE AND TRIMETHOPRIM 800; 160 MG/1; MG/1
1 TABLET ORAL 2 TIMES DAILY
Qty: 20 TAB | Refills: 0 | Status: SHIPPED | OUTPATIENT
Start: 2019-04-18 | End: 2019-04-23 | Stop reason: ALTCHOICE

## 2019-04-18 NOTE — PROGRESS NOTES
Chief Complaint Patient presents with  Urinary Burning  
pt c/o urinary burning and discomfort onset 1 day, pt denies taking anything otc to help with discomfort This note will not be viewable in [address]

## 2019-04-18 NOTE — PATIENT INSTRUCTIONS
Urinary Tract Infection in Women: Care Instructions Your Care Instructions A urinary tract infection, or UTI, is a general term for an infection anywhere between the kidneys and the urethra (where urine comes out). Most UTIs are bladder infections. They often cause pain or burning when you urinate. UTIs are caused by bacteria and can be cured with antibiotics. Be sure to complete your treatment so that the infection goes away. Follow-up care is a key part of your treatment and safety. Be sure to make and go to all appointments, and call your doctor if you are having problems. It's also a good idea to know your test results and keep a list of the medicines you take. How can you care for yourself at home? · Take your antibiotics as directed. Do not stop taking them just because you feel better. You need to take the full course of antibiotics. · Drink extra water and other fluids for the next day or two. This may help wash out the bacteria that are causing the infection. (If you have kidney, heart, or liver disease and have to limit fluids, talk with your doctor before you increase your fluid intake.) · Avoid drinks that are carbonated or have caffeine. They can irritate the bladder. · Urinate often. Try to empty your bladder each time. · To relieve pain, take a hot bath or lay a heating pad set on low over your lower belly or genital area. Never go to sleep with a heating pad in place. To prevent UTIs · Drink plenty of water each day. This helps you urinate often, which clears bacteria from your system. (If you have kidney, heart, or liver disease and have to limit fluids, talk with your doctor before you increase your fluid intake.) · Urinate when you need to. · Urinate right after you have sex. · Change sanitary pads often. · Avoid douches, bubble baths, feminine hygiene sprays, and other feminine hygiene products that have deodorants. · After going to the bathroom, wipe from front to back. When should you call for help? Call your doctor now or seek immediate medical care if: 
  · Symptoms such as fever, chills, nausea, or vomiting get worse or appear for the first time.  
  · You have new pain in your back just below your rib cage. This is called flank pain.  
  · There is new blood or pus in your urine.  
  · You have any problems with your antibiotic medicine.  
 Watch closely for changes in your health, and be sure to contact your doctor if: 
  · You are not getting better after taking an antibiotic for 2 days.  
  · Your symptoms go away but then come back. Where can you learn more? Go to http://geraldo-samuel.info/. Enter I886 in the search box to learn more about \"Urinary Tract Infection in Women: Care Instructions. \" Current as of: March 20, 2018 Content Version: 11.9 © 0407-5095 [employer]. Care instructions adapted under license by [employer] (which disclaims liability or warranty for this information). If you have questions about a medical condition or this instruction, always ask your healthcare professional. [address] any warranty or liability for your use of this information.

## 2019-04-18 NOTE — PROGRESS NOTES
Subjective:  
 
Ashley Ya is a 60 y.o. female who complains of dysuria, frequency, urgency for 2 days. Patient denies flank pain, vomiting, fever, unusual vaginal discharge. Patient does have a history of recurrent UTI. Patient does not have a history of pyelonephritis. Patient Active Problem List  
Diagnosis Code  
 HTN (hypertension) I10  
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  
 Diagnosis Date Noted  Occipital pain 04/04/2018  Pancytopenia ([address] 09/16/2017  Atrophic arthritis ([address] 09/16/2017  Urinary tract infection without hematuria 09/16/2017  Temporal pain 06/27/2016  Supraorbital neuralgia 03/21/2016  Chronic sinusitis 01/26/2016  Asthma 11/20/2015  
 HTN (hypertension) 10/25/2012  Cervical disc herniation 10/25/2012  RA (rheumatoid arthritis) ([facility] 10/25/2012  Insomnia 10/25/2012  Lumbar back pain 10/25/2012  Gluten-sensitive enteropathy 10/25/2012 Current Outpatient Medications Medication Sig Dispense Refill  trimethoprim-sulfamethoxazole (BACTRIM DS, SEPTRA DS) 160-800 mg per tablet Take 1 Tab by mouth two (2) times a day for 10 days. 20 Tab 0  
 fremanezumab-vfrm (AJOVY) 225 mg/1.5 mL syrg 675 mg by SubCUTAneous route every three (3) months. Indications: Migraine Prevention 3 Syringe 1  cranberry 400 mg cap Take  by mouth.  eletriptan (RELPAX) 40 mg tablet TAKE ONE TABLET BY MOUTH AT ONSET OF HEADACHE. MAY REPEAT IN 2 HOURS IF NEEDED. **DO NOT TAKE MORE THAN 2 TABELTS PER DAY** 9 Tab 11  
 mycophenolate (CELLCEPT) 500 mg tablet  SUMAtriptan succinate (ZEMBRACE SYMTOUCH) 3 mg/0.5 mL pnij 1 at HA onset and repeat in 1 hour x 1 prn  Max 4 in 24 hours 8 Syringe 6  citalopram (CELEXA) 20 mg tablet  INFLIXIMAB (REMICADE IV) by IntraVENous route every six (6) weeks.  azelastine-fluticasone (DYMISTA) 137-50 mcg/spray spry by Nasal route.  losartan (COZAAR) 50 mg tablet TAKE ONE TABLET BY MOUTH DAILY (Patient taking differently: TAKE 1/2 TABLET BY MOUTH DAILY) 90 Tab 0  
 VENTOLIN HFA 90 mcg/actuation inhaler INHALE TWO PUFFS BY MOUTH EVERY 4 HOURS AS NEEDED 1 Inhaler 4  
 fluticasone (FLOVENT HFA) 110 mcg/actuation inhaler Take 2 Puffs by inhalation two (2) times a day. 1 Inhaler 5  
 ascorbic acid (VITAMIN C) 500 mg tablet Take 1,000 mg by mouth daily.  cholecalciferol, vitamin D3, (VITAMIN D3) 2,000 unit Tab Take 2 tablets by mouth daily.  cyanocobalamin (VITAMIN B-12) 1,000 mcg tablet Take 1,000 mcg by mouth daily.  B.infantis-B.ani-B.long-B.bifi (PROBIOTIC 4X) 10-15 mg Tab Take 2 tablets by mouth daily.  erenumab-aooe (AIMOVIG AUTOINJECTOR) 70 mg/mL atIn 140 mg by SubCUTAneous route every month. 2 Syringe 5  
 albuterol (PROVENTIL VENTOLIN) 2.5 mg /3 mL (0.083 %) nebulizer solution 3 mL by Nebulization route every four (4) hours as needed for Wheezing. 1 Package 4  cyclobenzaprine (FLEXERIL) 10 mg tablet Take 1 Tab by mouth three (3) times daily as needed. 90 Tab 1 Allergies Allergen Reactions  Azithromycin Rash  Carafate [Sucralfate] Swelling  Cephalexin Hcl Rash  Enbrel [Etanercept] Other (comments) Blisters at injection sites  Penicillin G Rash  Protonix [Pantoprazole] Swelling  Tamiflu [Oseltamivir Phosphate] Swelling Past Medical History:  
Diagnosis Date  Anxiety 10/25/2012  Fatigue  Headache   
 HTN (hypertension) 10/25/2012  Insomnia 10/25/2012  Lumbar back pain 10/25/2012 Steroid injections,  Dr. Louise Murdock  RA (rheumatoid arthritis) ([facility] 10/25/2012  
 Skipped beats Past Surgical History:  
Procedure Laterality Date  HX BREAST REDUCTION    
 HX  SECTION    
 2  
 HX HEENT    
 deviated septum  HX ORTHOPAEDIC    
 R metatarsal joint fusion  NEUROLOGICAL PROCEDURE UNLISTED    
 lumbar radio frequency neurotomy Family History Problem Relation Age of Onset  Hypertension Mother  Asthma Mother  Arthritis-rheumatoid Mother  Asthma Maternal Aunt  Diabetes Maternal Grandfather  Asthma Other  Other Other   
     scleroderma  Cancer Other  Migraines Other  Stroke Other Social History Tobacco Use  Smoking status: Never Smoker  Smokeless tobacco: Never Used Substance Use Topics  Alcohol use: Yes Comment: socially Review of Systems Pertinent items are noted in HPI. Objective:  
 
Visit Vitals /84 (BP 1 Location: Left arm, BP Patient Position: Sitting) Pulse 79 Temp 98.2 °F (36.8 °C) (Oral) Resp 18 Ht 5' 6\" (1.676 m) Wt 143 lb (64.9 kg) SpO2 97% BMI 23.08 kg/m² General:  alert, cooperative, no distress Abdomen: soft, nontender, nondistended, no masses or organomegaly. Back:  CVA tenderness absent :  defer exam  
 
Laboratory:  
Urine dipstick shows positive for RBC's, positive for protein, positive for nitrates, positive for leukocytes and positive for ketones. Micro exam: not done. Sent to the lab Assessment/Plan:  
 
Acute cystitis, recurrent 1. TMP/SMX given for 10 full days due to the high incidence of incomplete clearing of previous UTI's and frequent need to return for a second treatment. The last 3 UTI's were proteus mirabilis and sensitive to Bactrim on culture. 2. Maintain adequate hydration 3. May use OTC pyridium as desired, which will turn urine orange/red color 4. Follow up if symptoms not improving, and prn. I have encouraged her to return to her urologist for a follow up visit and also to her GYN for evaluation and visit. She is in agreement. ICD-10-CM ICD-9-CM 1. Acute cystitis with hematuria N30.01 595.0 trimethoprim-sulfamethoxazole (BACTRIM DS, SEPTRA DS) 160-800 mg per tablet CULTURE, URINE  
   AMB POC URINALYSIS DIP STICK MANUAL W/O GEORGE Kim

## 2019-04-20 LAB
BACTERIA UR CULT: ABNORMAL
BACTERIA UR CULT: ABNORMAL

## 2019-04-23 DIAGNOSIS — N30.01 ACUTE CYSTITIS WITH HEMATURIA: Primary | ICD-10-CM

## 2019-04-23 RX ORDER — NITROFURANTOIN 25; 75 MG/1; MG/1
100 CAPSULE ORAL 2 TIMES DAILY
Qty: 14 CAP | Refills: 0 | Status: SHIPPED | OUTPATIENT
Start: 2019-04-23 | End: 2019-04-30

## 2019-04-23 NOTE — PROGRESS NOTES
Called patient with patient, Patient advised of urine culture results and recommendations per NP Caroline Valdez, Pt verbalized understanding. Pharmacy of choice [facility] as listed in patient's chart.

## 2019-05-29 ENCOUNTER — TELEPHONE (OUTPATIENT)
Dept: NEUROLOGY | Age: 59
End: 2019-05-29

## 2019-05-29 NOTE — TELEPHONE ENCOUNTER
Pt notified that Emeka Gray was sent to [facility] with [facility] information. Instructions given to pt regarding this being a syringe instead of pen injector and she would have to inject 3 at one time for a 3 mo at a time dose.   Pt agrees to try

## 2019-07-25 ENCOUNTER — OFFICE VISIT (OUTPATIENT)
Dept: NEUROLOGY | Age: 59
End: 2019-07-25

## 2019-07-25 VITALS
RESPIRATION RATE: 16 BRPM | WEIGHT: 137.8 LBS | OXYGEN SATURATION: 98 % | SYSTOLIC BLOOD PRESSURE: 130 MMHG | HEIGHT: 66 IN | HEART RATE: 87 BPM | BODY MASS INDEX: 22.14 KG/M2 | DIASTOLIC BLOOD PRESSURE: 80 MMHG

## 2019-07-25 DIAGNOSIS — G43.001 MIGRAINE WITHOUT AURA AND WITH STATUS MIGRAINOSUS, NOT INTRACTABLE: Primary | ICD-10-CM

## 2019-07-25 NOTE — PROGRESS NOTES
[facility] and [address] at [facility] Neurology Clinics at [facility] [address] [place], [id number] [place] [address] [address] [facility], [address] [place] [address]   [phone]              Chief Complaint   Patient presents with    Headache     changed from [facility] to [facility].  had 3 inj one mo ago (approx) and has noticed increased HA within last 2 wks     Current Outpatient Medications   Medication Sig Dispense Refill    fremanezumab-vfrm (AJOVY) 225 mg/1.5 mL syrg 675 mg by SubCUTAneous route every three (3) months. Indications: Migraine Prevention 3 Syringe 1    cranberry 400 mg cap Take  by mouth.  eletriptan (RELPAX) 40 mg tablet TAKE ONE TABLET BY MOUTH AT ONSET OF HEADACHE. MAY REPEAT IN 2 HOURS IF NEEDED. **DO NOT TAKE MORE THAN 2 TABELTS PER DAY** 9 Tab 11    mycophenolate (CELLCEPT) 500 mg tablet       SUMAtriptan succinate (ZEMBRACE SYMTOUCH) 3 mg/0.5 mL pnij 1 at HA onset and repeat in 1 hour x 1 prn  Max 4 in 24 hours 8 Syringe 6    citalopram (CELEXA) 20 mg tablet       INFLIXIMAB (REMICADE IV) by IntraVENous route every six (6) weeks.  azelastine-fluticasone (DYMISTA) 137-50 mcg/spray spry by Nasal route.  losartan (COZAAR) 50 mg tablet TAKE ONE TABLET BY MOUTH DAILY (Patient taking differently: TAKE 1/2 TABLET BY MOUTH DAILY) 90 Tab 0    VENTOLIN HFA 90 mcg/actuation inhaler INHALE TWO PUFFS BY MOUTH EVERY 4 HOURS AS NEEDED 1 Inhaler 4    fluticasone (FLOVENT HFA) 110 mcg/actuation inhaler Take 2 Puffs by inhalation two (2) times a day. 1 Inhaler 5    ascorbic acid (VITAMIN C) 500 mg tablet Take 1,000 mg by mouth daily.  cholecalciferol, vitamin D3, (VITAMIN D3) 2,000 unit Tab Take 2 tablets by mouth daily.  cyanocobalamin (VITAMIN B-12) 1,000 mcg tablet Take 1,000 mcg by mouth daily.  B.infantis-B.ani-B.long-B.bifi (PROBIOTIC 4X) 10-15 mg Tab Take 2 tablets by mouth daily. Allergies   Allergen Reactions    Azithromycin Rash    Carafate [Sucralfate] Swelling    Cephalexin Hcl Rash    Enbrel [Etanercept] Other (comments)     Blisters at injection sites    Penicillin G Rash    Protonix [Pantoprazole] Swelling    Tamiflu [Oseltamivir Phosphate] Swelling     Social History     Tobacco Use    Smoking status: Never Smoker    Smokeless tobacco: Never Used   Substance Use Topics    Alcohol use: Yes     Comment: socially    Drug use: No     Patient returns today for follow-up intractable migraine headaches. She was doing really well on Aimovig 140 mg monthly. Her insurance company however insisted she change to Ajovy. She had 3 injections of Ajovy about a month ago and her headaches have returned. They are not at their worst but they are much more frequent. She is run out of her Zembrace injections. No focal deficits. No fever chills. She does think the weather has something to do with it but not everything. She did tolerate the Ajovy. No focal weakness. No loss or alteration in consciousness. Palpitations or chest pain    Examination  Visit Vitals  /80 (BP 1 Location: Left arm, BP Patient Position: Sitting)   Pulse 87   Resp 16   Ht 5' 6\" (1.676 m)   Wt 62.5 kg (137 lb 12.8 oz)   SpO2 98%   BMI 22.24 kg/m²   Very pleasant lady. Awake alert oriented and conversant. Normal speech and lines. Steady gait    Impression/Plan  Intractable migraines worse since her insurance company forced her to change from Aimovig 140 mg over to Ajovy. We will let the next couple of months of Ajovy see if it takes hold as she is on the quarterly dosing. We will see her back middle of September which is prior to her next CGRP inhibitor dose and if she is doing well we will maintain Ajovy. If she is not doing well we will give her a sample of the Aimovig 140 mg and then go back to [place] and argue that the Ajovy is not as effective as Aimovig. Refill Zembrace.   Follow-up middle of September    Jose De Jesus Ramos MD    Total time: 25 min   Counseling / coordination time: 15 min   > 50% counseling / coordination?: Yes re: as documented above        This note was created using voice recognition software. Despite editing, there may be syntax errors. This note will not be viewable in [address]

## 2019-09-16 ENCOUNTER — OFFICE VISIT (OUTPATIENT)
Dept: NEUROLOGY | Age: 59
End: 2019-09-16

## 2019-09-16 VITALS
OXYGEN SATURATION: 96 % | HEART RATE: 76 BPM | BODY MASS INDEX: 22.5 KG/M2 | SYSTOLIC BLOOD PRESSURE: 126 MMHG | HEIGHT: 66 IN | RESPIRATION RATE: 16 BRPM | WEIGHT: 140 LBS | DIASTOLIC BLOOD PRESSURE: 86 MMHG

## 2019-09-16 DIAGNOSIS — G43.001 MIGRAINE WITHOUT AURA AND WITH STATUS MIGRAINOSUS, NOT INTRACTABLE: Primary | ICD-10-CM

## 2019-09-16 NOTE — PROGRESS NOTES
[facility] Neurology Clinics and [address] at [facility] Neurology Clinics at [address] [facility], [id number] [place] [address] [address] [facility], [address] [place] [address]   [phone]              Chief Complaint   Patient presents with    Migraine     freq approx 5-6/mo which is down. This last month approx 4-5.  will have 2nd round of Ajovy 675mg 9/17/19     Current Outpatient Medications   Medication Sig Dispense Refill    SUMAtriptan succinate (ZEMBRACE SYMTOUCH) 3 mg/0.5 mL pnij 1 at HA onset and repeat in 1 hour x 1 prn  Max 4 in 24 hours 8 Syringe 6    fremanezumab-vfrm (AJOVY) 225 mg/1.5 mL syrg 675 mg by SubCUTAneous route every three (3) months. Indications: Migraine Prevention 3 Syringe 1    eletriptan (RELPAX) 40 mg tablet TAKE ONE TABLET BY MOUTH AT ONSET OF HEADACHE. MAY REPEAT IN 2 HOURS IF NEEDED. **DO NOT TAKE MORE THAN 2 TABELTS PER DAY** 9 Tab 11    mycophenolate (CELLCEPT) 500 mg tablet       citalopram (CELEXA) 20 mg tablet Take 20 mg by mouth daily.  INFLIXIMAB (REMICADE IV) by IntraVENous route every six (6) weeks.  azelastine-fluticasone (DYMISTA) 137-50 mcg/spray spry by Nasal route.  losartan (COZAAR) 50 mg tablet TAKE ONE TABLET BY MOUTH DAILY (Patient taking differently: TAKE 1/2 TABLET BY MOUTH DAILY) 90 Tab 0    VENTOLIN HFA 90 mcg/actuation inhaler INHALE TWO PUFFS BY MOUTH EVERY 4 HOURS AS NEEDED 1 Inhaler 4    fluticasone (FLOVENT HFA) 110 mcg/actuation inhaler Take 2 Puffs by inhalation two (2) times a day. 1 Inhaler 5    ascorbic acid (VITAMIN C) 500 mg tablet Take 1,000 mg by mouth daily.  cholecalciferol, vitamin D3, (VITAMIN D3) 2,000 unit Tab Take 2 tablets by mouth daily.  cyanocobalamin (VITAMIN B-12) 1,000 mcg tablet Take 1,000 mcg by mouth daily.  B.infantis-B.ani-B.long-B.bifi (PROBIOTIC 4X) 10-15 mg Tab Take 2 tablets by mouth daily.         Allergies   Allergen Reactions    Azithromycin Rash    Carafate [Sucralfate] Swelling    Cephalexin Hcl Rash    Enbrel [Etanercept] Other (comments)     Blisters at injection sites    Penicillin G Rash    Protonix [Pantoprazole] Swelling    Tamiflu [Oseltamivir Phosphate] Swelling     Social History     Tobacco Use    Smoking status: Never Smoker    Smokeless tobacco: Never Used   Substance Use Topics    Alcohol use: Yes     Comment: socially    Drug use: No   Patient returns today for follow-up intractable migraines. She is on Ajovy. She was on Aimovig but that had to be changed due to her insurance. She was having greater than 10 headaches per month prior to the institution of CGRP inhibition. She is having for maybe 5 a month. Relpax is typically effective. She had to use one Zembrace. Overall she is very happy with how she is doing. Examination  Visit Vitals  /86 (BP 1 Location: Left arm, BP Patient Position: Sitting)   Pulse 76   Resp 16   Ht 5' 6\" (1.676 m)   Wt 63.5 kg (140 lb)   SpO2 96%   BMI 22.60 kg/m²     She looks really good today. She is awake alert oriented and conversant. Normal speech and language. Steady gait    Impression/Plan  Intractable migraines much better with the Marcus. Continue this. Continue Relpax and Zembrace in a toolbox type fashion. We will have her follow-up in 3 months and if she still doing really well at that point we will relegate her to q. 6-month follow-up    Po Mendosa MD      This note was created using voice recognition software. Despite editing, there may be syntax errors. This note will not be viewable in [address]

## 2019-09-20 ENCOUNTER — TELEPHONE (OUTPATIENT)
Dept: NEUROLOGY | Age: 59
End: 2019-09-20

## 2019-09-20 NOTE — TELEPHONE ENCOUNTER
Prior Auth APPROVED for Ajovy by [employer]. Effective dates 09/20/19 - 09/20/20. Case #[id number]. Approval will be scanned into media for review. Please send Rx to patient's preferred pharmacy (if necessary).

## 2019-09-20 NOTE — TELEPHONE ENCOUNTER
Prior Auth submitted for Ajovy to [employer] via Cover My Meds. Status Pending. Allow 24-72 hours for response.     [address] Key: [id number]  Case #: [id number]

## 2019-11-01 ENCOUNTER — OFFICE VISIT (OUTPATIENT)
Dept: PRIMARY CARE CLINIC | Age: 59
End: 2019-11-01

## 2019-11-01 VITALS
OXYGEN SATURATION: 98 % | RESPIRATION RATE: 16 BRPM | SYSTOLIC BLOOD PRESSURE: 130 MMHG | DIASTOLIC BLOOD PRESSURE: 84 MMHG | WEIGHT: 144.8 LBS | BODY MASS INDEX: 23.27 KG/M2 | TEMPERATURE: 98.2 F | HEART RATE: 83 BPM | HEIGHT: 66 IN

## 2019-11-01 DIAGNOSIS — J01.10 ACUTE FRONTAL SINUSITIS, RECURRENCE NOT SPECIFIED: ICD-10-CM

## 2019-11-01 DIAGNOSIS — J01.00 ACUTE MAXILLARY SINUSITIS, RECURRENCE NOT SPECIFIED: Primary | ICD-10-CM

## 2019-11-01 RX ORDER — DOXYCYCLINE 100 MG/1
100 CAPSULE ORAL 2 TIMES DAILY
Qty: 14 CAP | Refills: 0 | Status: SHIPPED | OUTPATIENT
Start: 2019-11-01 | End: 2019-11-08

## 2019-11-01 RX ORDER — CETIRIZINE HCL 10 MG
TABLET ORAL
COMMUNITY
End: 2021-10-05

## 2019-11-01 RX ORDER — HYDROCODONE BITARTRATE AND ACETAMINOPHEN 5; 325 MG/1; MG/1
1 TABLET ORAL
COMMUNITY
Start: 2019-09-28

## 2019-11-01 NOTE — PATIENT INSTRUCTIONS
Sinusitis: Care Instructions Your Care Instructions Sinusitis is an infection of the lining of the sinus cavities in your head. Sinusitis often follows a cold. It causes pain and pressure in your head and face. In most cases, sinusitis gets better on its own in 1 to 2 weeks. But some mild symptoms may last for several weeks. Sometimes antibiotics are needed. Follow-up care is a key part of your treatment and safety. Be sure to make and go to all appointments, and call your doctor if you are having problems. It's also a good idea to know your test results and keep a list of the medicines you take. How can you care for yourself at home? · Take an over-the-counter pain medicine, such as acetaminophen (Tylenol), ibuprofen (Advil, Motrin), or naproxen (Aleve). Read and follow all instructions on the label. · If the doctor prescribed antibiotics, take them as directed. Do not stop taking them just because you feel better. You need to take the full course of antibiotics. · Be careful when taking over-the-counter cold or flu medicines and Tylenol at the same time. Many of these medicines have acetaminophen, which is Tylenol. Read the labels to make sure that you are not taking more than the recommended dose. Too much acetaminophen (Tylenol) can be harmful. · Breathe warm, moist air from a steamy shower, a hot bath, or a sink filled with hot water. Avoid cold, dry air. Using a humidifier in your home may help. Follow the directions for cleaning the machine. · Use saline (saltwater) nasal washes to help keep your nasal passages open and wash out mucus and bacteria. You can buy saline nose drops at a grocery store or drugstore. Or you can make your own at home by adding 1 teaspoon of salt and 1 teaspoon of baking soda to 2 cups of distilled water. If you make your own, fill a bulb syringe with the solution, insert the tip into your nostril, and squeeze gently. Lee Crea your nose. · Put a hot, wet towel or a warm gel pack on your face 3 or 4 times a day for 5 to 10 minutes each time. · Try a decongestant nasal spray like oxymetazoline (Afrin). Do not use it for more than 3 days in a row. Using it for more than 3 days can make your congestion worse. When should you call for help? Call your doctor now or seek immediate medical care if: 
  · You have new or worse swelling or redness in your face or around your eyes.  
  · You have a new or higher fever.  
 Watch closely for changes in your health, and be sure to contact your doctor if: 
  · You have new or worse facial pain.  
  · The mucus from your nose becomes thicker (like pus) or has new blood in it.  
  · You are not getting better as expected. Where can you learn more? Go to http://geraldo-samuel.info/. Enter V741 in the search box to learn more about \"Sinusitis: Care Instructions. \" Current as of: October 21, 2018 Content Version: 12.2 © 9539-5581 [employer]. Care instructions adapted under license by [employer] (which disclaims liability or warranty for this information). If you have questions about a medical condition or this instruction, always ask your healthcare professional. [place] [address] any warranty or liability for your use of this information.

## 2019-11-01 NOTE — PROGRESS NOTES
Subjective:   Linda Diaz is a 60 y.o. female who complains of congestion, sore throat (due to drainage), post nasal drip, dry cough, bilateral sinus pain and bilateral ear pressure for 2.5 weeks, gradually worsening since that time. Denies any fevers. She denies a history of shortness of breath and wheezing. Evaluation to date: none. Treatment to date: OTC products. Patient does not smoke cigarettes. Relevant PMH:   Past Medical History:   Diagnosis Date    Anxiety 10/25/2012    Fatigue     Headache     HTN (hypertension) 10/25/2012    Insomnia 10/25/2012    Lumbar back pain 10/25/2012    Steroid injections,  Dr. Ara Rock RA (rheumatoid arthritis) ([facility] [address] 10/25/2012    Skipped beats      Past Surgical History:   Procedure Laterality Date    HX BREAST REDUCTION      HX  SECTION      2    HX HEENT      deviated septum    HX ORTHOPAEDIC      R metatarsal joint fusion    NEUROLOGICAL PROCEDURE UNLISTED      lumbar radio frequency neurotomy     Allergies   Allergen Reactions    Azithromycin Rash    Carafate [Sucralfate] Swelling    Cephalexin Hcl Rash    Enbrel [Etanercept] Other (comments)     Blisters at injection sites    Penicillin G Rash    Protonix [Pantoprazole] Swelling    Tamiflu [Oseltamivir Phosphate] Swelling         Review of Systems  Pertinent items are noted in HPI.     Objective:     Visit Vitals  /84 (BP 1 Location: Left arm, BP Patient Position: Sitting)   Pulse 83   Temp 98.2 °F (36.8 °C) (Oral)   Resp 16   Ht 5' 6\" (1.676 m)   Wt 144 lb 12.8 oz (65.7 kg)   SpO2 98%   BMI 23.37 kg/m²     General:  alert, cooperative, no distress   Eyes: negative   Ears: normal TM's and external ear canals AU   Sinuses: tenderness over bilateral maxillary and frontal   Mouth:  Lips, mucosa, and tongue normal. Teeth and gums normal and normal findings: oropharynx pink & moist without lesions or evidence of thrush   Neck: supple, symmetrical, trachea midline and no adenopathy. Heart: S1 and S2 normal, no murmurs noted. Lungs: clear to auscultation bilaterally        Assessment/Plan:       ICD-10-CM ICD-9-CM    1. Acute maxillary sinusitis, recurrence not specified J01.00 461.0    2. Acute frontal sinusitis, recurrence not specified J01.10 461.1      Orders Placed This Encounter    HYDROcodone-acetaminophen (NORCO) 5-325 mg per tablet    cetirizine (ZYRTEC) 10 mg tablet    doxycycline (MONODOX) 100 mg capsule     Discussed the dx and tx of sinusitis. Suggested symptomatic OTC remedies. Nasal saline sprays for congestion. Antibiotics per orders. RTC prn. Luis Angel Alas NP  This note will not be viewable in [address]

## 2019-11-01 NOTE — PROGRESS NOTES
RM 6    Chief Complaint   Patient presents with    Cold Symptoms     nasal congestion, headache ,chest congestion, clogged ears x 2 weeks, dry cough x 2 days       Visit Vitals  /84 (BP 1 Location: Left arm, BP Patient Position: Sitting)   Pulse 83   Temp 98.2 °F (36.8 °C) (Oral)   Resp 16   Ht 5' 6\" (1.676 m)   Wt 144 lb 12.8 oz (65.7 kg)   SpO2 98%   BMI 23.37 kg/m²

## 2019-11-19 ENCOUNTER — OFFICE VISIT (OUTPATIENT)
Dept: PRIMARY CARE CLINIC | Age: 59
End: 2019-11-19

## 2019-11-19 VITALS
TEMPERATURE: 98.1 F | HEART RATE: 79 BPM | OXYGEN SATURATION: 96 % | RESPIRATION RATE: 16 BRPM | DIASTOLIC BLOOD PRESSURE: 78 MMHG | BODY MASS INDEX: 23.21 KG/M2 | SYSTOLIC BLOOD PRESSURE: 113 MMHG | WEIGHT: 144.4 LBS | HEIGHT: 66 IN

## 2019-11-19 DIAGNOSIS — N30.01 ACUTE CYSTITIS WITH HEMATURIA: Primary | ICD-10-CM

## 2019-11-19 LAB
BILIRUB UR QL STRIP: NEGATIVE
GLUCOSE UR-MCNC: NEGATIVE MG/DL
KETONES P FAST UR STRIP-MCNC: NEGATIVE MG/DL
PH UR STRIP: 7 [PH] (ref 4.6–8)
PROT UR QL STRIP: NORMAL
SP GR UR STRIP: 1.02 (ref 1–1.03)
UA UROBILINOGEN AMB POC: NORMAL (ref 0.2–1)
URINALYSIS CLARITY POC: CLEAR
URINALYSIS COLOR POC: YELLOW
URINE BLOOD POC: NORMAL
URINE LEUKOCYTES POC: NORMAL
URINE NITRITES POC: POSITIVE

## 2019-11-19 RX ORDER — BENZONATATE 100 MG/1
CAPSULE ORAL
COMMUNITY
Start: 2019-11-04 | End: 2019-12-16 | Stop reason: ALTCHOICE

## 2019-11-19 RX ORDER — AZELASTINE HYDROCHLORIDE, FLUTICASONE PROPIONATE 137; 50 UG/1; UG/1
SPRAY, METERED NASAL
COMMUNITY
Start: 2018-01-18 | End: 2019-11-19 | Stop reason: SDUPTHER

## 2019-11-19 RX ORDER — MYCOPHENOLATE MOFETIL 500 MG/1
TABLET ORAL
COMMUNITY
Start: 2018-02-08 | End: 2019-11-19 | Stop reason: SDUPTHER

## 2019-11-19 RX ORDER — EPINEPHRINE 0.3 MG/.3ML
INJECTION SUBCUTANEOUS
COMMUNITY
Start: 2019-11-04 | End: 2020-03-16

## 2019-11-19 RX ORDER — NITROFURANTOIN 25; 75 MG/1; MG/1
100 CAPSULE ORAL 2 TIMES DAILY
Qty: 14 CAP | Refills: 0 | Status: SHIPPED | OUTPATIENT
Start: 2019-11-19 | End: 2019-12-16 | Stop reason: ALTCHOICE

## 2019-11-19 RX ORDER — ALBUTEROL SULFATE 0.83 MG/ML
SOLUTION RESPIRATORY (INHALATION)
COMMUNITY
Start: 2019-11-04

## 2019-11-19 RX ORDER — CODEINE PHOSPHATE AND GUAIFENESIN 10; 100 MG/5ML; MG/5ML
SOLUTION ORAL
COMMUNITY
Start: 2019-11-04 | End: 2019-12-16 | Stop reason: ALTCHOICE

## 2019-11-19 RX ORDER — LOSARTAN POTASSIUM 25 MG/1
TABLET ORAL
COMMUNITY
Start: 2019-10-01 | End: 2019-11-19 | Stop reason: SDUPTHER

## 2019-11-19 RX ORDER — HYDROXYCHLOROQUINE SULFATE 200 MG/1
200 TABLET, FILM COATED ORAL DAILY
COMMUNITY
Start: 2019-10-01

## 2019-11-19 RX ORDER — METHYLPREDNISOLONE 4 MG/1
TABLET ORAL
COMMUNITY
Start: 2019-11-04 | End: 2019-12-16 | Stop reason: ALTCHOICE

## 2019-11-19 RX ORDER — DOXYCYCLINE 100 MG/1
CAPSULE ORAL
COMMUNITY
Start: 2019-11-04 | End: 2019-12-16 | Stop reason: ALTCHOICE

## 2019-11-19 RX ORDER — FLUTICASONE PROPIONATE 110 UG/1
AEROSOL, METERED RESPIRATORY (INHALATION)
COMMUNITY
Start: 2018-01-18 | End: 2019-11-19 | Stop reason: SDUPTHER

## 2019-11-19 RX ORDER — DICLOFENAC SODIUM 20 MG/G
SOLUTION TOPICAL
COMMUNITY
Start: 2018-04-19 | End: 2019-12-16 | Stop reason: ALTCHOICE

## 2019-11-19 NOTE — PROGRESS NOTES
Yolanda Zhao is a 60 y.o. female    Room:4    Chief Complaint   Patient presents with    Abdominal Pain     Pt States \" UTI, i get them arounf this time of year, burning when go to the bathroom , started yesterday but got worse today, pressure, and urgency\". Visit Vitals  /78 (BP 1 Location: Left arm, BP Patient Position: Sitting)   Pulse 79   Temp 98.1 °F (36.7 °C) (Oral)   Resp 16   Ht 5' 6\" (1.676 m)   Wt 144 lb 6.4 oz (65.5 kg)   SpO2 96%   BMI 23.31 kg/m²       Pain Scale: 5/10    1. Have you been to the ER, urgent care clinic since your last visit? Hospitalized since your last visit? No    2. Have you seen or consulted any other health care providers outside of the [address] [place] [address] since your last visit? Include any pap smears or colon screening.  No

## 2019-11-19 NOTE — PATIENT INSTRUCTIONS
Urinary Tract Infection in Women: Care Instructions  Your Care Instructions    A urinary tract infection, or UTI, is a general term for an infection anywhere between the kidneys and the urethra (where urine comes out). Most UTIs are bladder infections. They often cause pain or burning when you urinate. UTIs are caused by bacteria and can be cured with antibiotics. Be sure to complete your treatment so that the infection goes away. Follow-up care is a key part of your treatment and safety. Be sure to make and go to all appointments, and call your doctor if you are having problems. It's also a good idea to know your test results and keep a list of the medicines you take. How can you care for yourself at home? · Take your antibiotics as directed. Do not stop taking them just because you feel better. You need to take the full course of antibiotics. · Drink extra water and other fluids for the next day or two. This may help wash out the bacteria that are causing the infection. (If you have kidney, heart, or liver disease and have to limit fluids, talk with your doctor before you increase your fluid intake.)  · Avoid drinks that are carbonated or have caffeine. They can irritate the bladder. · Urinate often. Try to empty your bladder each time. · To relieve pain, take a hot bath or lay a heating pad set on low over your lower belly or genital area. Never go to sleep with a heating pad in place. To prevent UTIs  · Drink plenty of water each day. This helps you urinate often, which clears bacteria from your system. (If you have kidney, heart, or liver disease and have to limit fluids, talk with your doctor before you increase your fluid intake.)  · Urinate when you need to. · Urinate right after you have sex. · Change sanitary pads often. · Avoid douches, bubble baths, feminine hygiene sprays, and other feminine hygiene products that have deodorants.   · After going to the bathroom, wipe from front to back.  When should you call for help? Call your doctor now or seek immediate medical care if:    · Symptoms such as fever, chills, nausea, or vomiting get worse or appear for the first time.     · You have new pain in your back just below your rib cage. This is called flank pain.     · There is new blood or pus in your urine.     · You have any problems with your antibiotic medicine.    Watch closely for changes in your health, and be sure to contact your doctor if:    · You are not getting better after taking an antibiotic for 2 days.     · Your symptoms go away but then come back. Where can you learn more? Go to http://geraldo-samuel.info/. Enter C675 in the search box to learn more about \"Urinary Tract Infection in Women: Care Instructions. \"  Current as of: December 19, 2018  Content Version: 12.2  © 2936-7360 [employer]. Care instructions adapted under license by [employer] (which disclaims liability or warranty for this information). If you have questions about a medical condition or this instruction, always ask your healthcare professional. [address] any warranty or liability for your use of this information.

## 2019-11-21 NOTE — PROGRESS NOTES
Subjective:     Sirisha Elena is a 60 y.o. female who complains of dysuria, frequency, urgency for 2 days. Patient denies flank pain, vomiting, fever, unusual vaginal discharge. Patient does not have a history of recurrent UTI. Patient does not have a history of pyelonephritis. 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    Temporal pain R51    Pancytopenia (HCC) D61.818    Atrophic arthritis ([address] M06.9    Urinary tract infection without hematuria N39.0    Occipital pain R51     Patient Active Problem List    Diagnosis Date Noted    Occipital pain 04/04/2018    Pancytopenia ([address] 09/16/2017    Atrophic arthritis ([facility] [address] 09/16/2017    Urinary tract infection without hematuria 09/16/2017    Temporal pain 06/27/2016    Supraorbital neuralgia 03/21/2016    Chronic sinusitis 01/26/2016    Asthma 11/20/2015    HTN (hypertension) 10/25/2012    Cervical disc herniation 10/25/2012    RA (rheumatoid arthritis) ([facility] [address] 10/25/2012    Insomnia 10/25/2012    Lumbar back pain 10/25/2012    Gluten-sensitive enteropathy 10/25/2012     Current Outpatient Medications   Medication Sig Dispense Refill    SUMAtriptan succinate (ZEMBRACE SYMTOUCH) 3 mg/0.5 mL pnij       EPINEPHrine (EPIPEN) 0.3 mg/0.3 mL injection       hydroxychloroquine (PLAQUENIL) 200 mg tablet       albuterol (PROVENTIL VENTOLIN) 2.5 mg /3 mL (0.083 %) nebu       nitrofurantoin, macrocrystal-monohydrate, (MACROBID) 100 mg capsule Take 1 Cap by mouth two (2) times a day. 14 Cap 0    HYDROcodone-acetaminophen (NORCO) 5-325 mg per tablet       cetirizine (ZYRTEC) 10 mg tablet Take  by mouth.       SUMAtriptan succinate (ZEMBRACE SYMTOUCH) 3 mg/0.5 mL pnij 1 at HA onset and repeat in 1 hour x 1 prn  Max 4 in 24 hours 8 Syringe 6    fremanezumab-vfrm (AJOVY) 225 mg/1.5 mL syrg 675 mg by SubCUTAneous route every three (3) months. Indications: Migraine Prevention 3 Syringe 1    eletriptan (RELPAX) 40 mg tablet TAKE ONE TABLET BY MOUTH AT ONSET OF HEADACHE. MAY REPEAT IN 2 HOURS IF NEEDED. **DO NOT TAKE MORE THAN 2 TABELTS PER DAY** 9 Tab 11    mycophenolate (CELLCEPT) 500 mg tablet       citalopram (CELEXA) 20 mg tablet Take 20 mg by mouth daily.  INFLIXIMAB (REMICADE IV) by IntraVENous route every six (6) weeks.  azelastine-fluticasone (DYMISTA) 137-50 mcg/spray spry by Nasal route.  losartan (COZAAR) 50 mg tablet TAKE ONE TABLET BY MOUTH DAILY (Patient taking differently: TAKE 1/2 TABLET BY MOUTH DAILY) 90 Tab 0    VENTOLIN HFA 90 mcg/actuation inhaler INHALE TWO PUFFS BY MOUTH EVERY 4 HOURS AS NEEDED 1 Inhaler 4    fluticasone (FLOVENT HFA) 110 mcg/actuation inhaler Take 2 Puffs by inhalation two (2) times a day. 1 Inhaler 5    ascorbic acid (VITAMIN C) 500 mg tablet Take 1,000 mg by mouth daily.  cholecalciferol, vitamin D3, (VITAMIN D3) 2,000 unit Tab Take 2 tablets by mouth daily.  cyanocobalamin (VITAMIN B-12) 1,000 mcg tablet Take 1,000 mcg by mouth daily.  B.infantis-B.ani-B.long-B.bifi (PROBIOTIC 4X) 10-15 mg Tab Take 2 tablets by mouth daily.  benzonatate (TESSALON) 100 mg capsule       guaiFENesin-codeine (ROBITUSSIN AC) 100-10 mg/5 mL solution       diclofenac sodium (PENNSAID) 20 mg/gram /actuation(2 %) sopm Apply 2 pumps BID on affected area. Dispense as written.       doxycycline (VIBRAMYCIN) 100 mg capsule       methylPREDNISolone (MEDROL DOSEPACK) 4 mg tablet        Allergies   Allergen Reactions    Azithromycin Rash    Carafate [Sucralfate] Swelling    Cephalexin Hcl Rash    Enbrel [Etanercept] Other (comments)     Blisters at injection sites    Penicillin G Rash    Protonix [Pantoprazole] Swelling    Tamiflu [Oseltamivir Phosphate] Swelling     Past Medical History:   Diagnosis Date    Anxiety 10/25/2012    Fatigue     Headache     HTN (hypertension) 10/25/2012    Insomnia 10/25/2012    Lumbar back pain 10/25/2012    Steroid injections,  Dr. Mateo García RA (rheumatoid arthritis) ([facility] [address] 10/25/2012    Skipped beats      Past Surgical History:   Procedure Laterality Date    HX BREAST REDUCTION      HX  SECTION      2    HX HEENT      deviated septum    HX ORTHOPAEDIC      R metatarsal joint fusion    NEUROLOGICAL PROCEDURE UNLISTED      lumbar radio frequency neurotomy     Family History   Problem Relation Age of Onset    Hypertension Mother    [facility] Asthma Mother     Arthritis-rheumatoid Mother     Asthma Maternal Aunt     Diabetes Maternal Grandfather     Asthma Other     Other Other         scleroderma    Cancer Other     Migraines Other     Stroke Other      Social History     Tobacco Use    Smoking status: Never Smoker    Smokeless tobacco: Never Used   Substance Use Topics    Alcohol use: Yes     Comment: socially        Review of Systems  Pertinent items are noted in HPI. Objective:     Visit Vitals  /78 (BP 1 Location: Left arm, BP Patient Position: Sitting)   Pulse 79   Temp 98.1 °F (36.7 °C) (Oral)   Resp 16   Ht 5' 6\" (1.676 m)   Wt 144 lb 6.4 oz (65.5 kg)   SpO2 96%   BMI 23.31 kg/m²     General:  alert, cooperative, no distress   Abdomen: soft, nontender, nondistended, no masses or organomegaly. Back:  CVA tenderness absent   :  defer exam     Laboratory:   Urine dipstick shows positive for RBC's and positive for leukocytes. Micro exam: not done. Assessment/Plan:     Acute cystitis     1. nitrofurantoin  2. Maintain adequate hydration  3. May use OTC pyridium as desired, which will turn urine orange/red color  4. Follow up if symptoms not improving, and prn. ICD-10-CM ICD-9-CM    1.  Acute cystitis with hematuria N30.01 595.0 nitrofurantoin, macrocrystal-monohydrate, (MACROBID) 100 mg capsule      AMB POC URINALYSIS DIP STICK AUTO W/O MICRO      CULTURE, URINE   .

## 2019-11-22 LAB — BACTERIA UR CULT: ABNORMAL

## 2019-12-16 ENCOUNTER — OFFICE VISIT (OUTPATIENT)
Dept: NEUROLOGY | Age: 59
End: 2019-12-16

## 2019-12-16 VITALS
OXYGEN SATURATION: 98 % | RESPIRATION RATE: 16 BRPM | HEART RATE: 79 BPM | DIASTOLIC BLOOD PRESSURE: 90 MMHG | WEIGHT: 146 LBS | SYSTOLIC BLOOD PRESSURE: 140 MMHG | BODY MASS INDEX: 23.46 KG/M2 | HEIGHT: 66 IN

## 2019-12-16 DIAGNOSIS — G43.011 INTRACTABLE MIGRAINE WITHOUT AURA AND WITH STATUS MIGRAINOSUS: ICD-10-CM

## 2019-12-16 RX ORDER — ELETRIPTAN HYDROBROMIDE 40 MG/1
TABLET, FILM COATED ORAL
Qty: 9 TAB | Refills: 11 | Status: SHIPPED | OUTPATIENT
Start: 2019-12-16 | End: 2020-01-28

## 2019-12-16 NOTE — PROGRESS NOTES
[facility] and [address] at [facility] at [address] [facility], [address] [facility] [address] [facility], [address] [place] [address]   [phone]              Chief Complaint   Patient presents with    Migraine     3 mo after having 675 mg Ajovy dose. just took 3rd round  in Oct.  States she is not doing as well this go round  Migraines started increasing around Nov. (approx 10 since first week of Nov)     Current Outpatient Medications   Medication Sig Dispense Refill    EPINEPHrine (EPIPEN) 0.3 mg/0.3 mL injection       hydroxychloroquine (PLAQUENIL) 200 mg tablet Take 200 mg by mouth daily.  albuterol (PROVENTIL VENTOLIN) 2.5 mg /3 mL (0.083 %) nebu       HYDROcodone-acetaminophen (NORCO) 5-325 mg per tablet Take 1 Tab by mouth every four (4) hours as needed.  cetirizine (ZYRTEC) 10 mg tablet Take  by mouth.  SUMAtriptan succinate (ZEMBRACE SYMTOUCH) 3 mg/0.5 mL pnij 1 at HA onset and repeat in 1 hour x 1 prn  Max 4 in 24 hours 8 Syringe 6    fremanezumab-vfrm (AJOVY) 225 mg/1.5 mL syrg 675 mg by SubCUTAneous route every three (3) months. Indications: Migraine Prevention 3 Syringe 1    eletriptan (RELPAX) 40 mg tablet TAKE ONE TABLET BY MOUTH AT ONSET OF HEADACHE. MAY REPEAT IN 2 HOURS IF NEEDED. **DO NOT TAKE MORE THAN 2 TABELTS PER DAY** 9 Tab 11    INFLIXIMAB (REMICADE IV) by IntraVENous route every six (6) weeks.  azelastine-fluticasone (DYMISTA) 137-50 mcg/spray spry by Nasal route.  losartan (COZAAR) 50 mg tablet TAKE ONE TABLET BY MOUTH DAILY (Patient taking differently: TAKE 1/2 TABLET BY MOUTH DAILY) 90 Tab 0    VENTOLIN HFA 90 mcg/actuation inhaler INHALE TWO PUFFS BY MOUTH EVERY 4 HOURS AS NEEDED 1 Inhaler 4    fluticasone (FLOVENT HFA) 110 mcg/actuation inhaler Take 2 Puffs by inhalation two (2) times a day.  1 Inhaler 5    ascorbic acid (VITAMIN C) 500 mg tablet Take 1,000 mg by mouth daily.  cholecalciferol, vitamin D3, (VITAMIN D3) 2,000 unit Tab Take 2 tablets by mouth daily.  cyanocobalamin (VITAMIN B-12) 1,000 mcg tablet Take 1,000 mcg by mouth daily.  B.infantis-B.ani-B.long-B.bifi (PROBIOTIC 4X) 10-15 mg Tab Take 2 tablets by mouth daily.  citalopram (CELEXA) 20 mg tablet Take 20 mg by mouth daily. Allergies   Allergen Reactions    Azithromycin Rash    Carafate [Sucralfate] Swelling    Cephalexin Hcl Rash    Enbrel [Etanercept] Other (comments)     Blisters at injection sites    Penicillin G Rash    Protonix [Pantoprazole] Swelling    Tamiflu [Oseltamivir Phosphate] Swelling     Social History     Tobacco Use    Smoking status: Never Smoker    Smokeless tobacco: Never Used   Substance Use Topics    Alcohol use: Yes     Comment: socially    Drug use: No     Patient returns today for follow-up migraine headaches. Last visit she was doing quite well with the [address] [place] [address] Avenue for prevention as well as Relpax and Zembrace for abortive therapy. She had at least a 50% reduction in her headaches and was having about 5/month. She comes today and reports she has had increasing frequency of her migraines despite using the Ajovy. Last shot sequence was around October 12 and she does to quarterly dosing. She says since the beginning of November she has had 10 migraines which still equates to about 5/month. She also had some bronchitis and was sick recently. No difficulty with the injections. The Relpax is typically effective although she has had a couple of times where it failed and she had to resort to using Zembrace. Tolerates all medicines without difficulty. Examination  Visit Vitals  /90 (BP 1 Location: Left arm, BP Patient Position: Sitting)   Pulse 79   Resp 16   Ht 5' 6\" (1.676 m)   Wt 66.2 kg (146 lb)   SpO2 98%   BMI 23.57 kg/m²     Pleasant lady. Awake alert oriented and conversant. Normal speech and language.   Normal cognition. Intact cranial nerves. No motor focality. No ataxia. Steady gait    Impression/Plan  Intractable migraine headaches a bit worse and this has been historically her worst time a year. She also was sick and other variables. For that reason we will continue with our current regimen for now. We will have her undergo her next quarterly injections of Ajovy in January and I will see her in March    Marvin Sterling MD      This note was created using voice recognition software. Despite editing, there may be syntax errors. This note will not be viewable in [address]

## 2019-12-21 ENCOUNTER — OFFICE VISIT (OUTPATIENT)
Dept: PRIMARY CARE CLINIC | Age: 59
End: 2019-12-21

## 2019-12-21 VITALS
HEIGHT: 66 IN | WEIGHT: 145.8 LBS | HEART RATE: 79 BPM | OXYGEN SATURATION: 96 % | TEMPERATURE: 98.1 F | RESPIRATION RATE: 14 BRPM | SYSTOLIC BLOOD PRESSURE: 126 MMHG | DIASTOLIC BLOOD PRESSURE: 80 MMHG | BODY MASS INDEX: 23.43 KG/M2

## 2019-12-21 DIAGNOSIS — J01.00 ACUTE MAXILLARY SINUSITIS, RECURRENCE NOT SPECIFIED: Primary | ICD-10-CM

## 2019-12-21 RX ORDER — DOXYCYCLINE 100 MG/1
100 TABLET ORAL 2 TIMES DAILY
Qty: 20 TAB | Refills: 0 | Status: SHIPPED | OUTPATIENT
Start: 2019-12-21 | End: 2020-04-15 | Stop reason: ALTCHOICE

## 2019-12-21 NOTE — PROGRESS NOTES
Flavio Burnham  Identified pt with two pt identifiers(name and ). Chief Complaint   Patient presents with    Sinus Infection     Room 5// sx's began yesterday - head pressure, productive cough, ear pressure, daily Zyrtec and Beandryl PRN // denies fever // dx w/ bronchitis on         1. Have you been to the ER, urgent care clinic since your last visit? Hospitalized since your last visit? NO    2. Have you seen or consulted any other health care providers outside of the [address] [place] [address] since your last visit? Include any pap smears or colon screening. NO      Provider notified of reason for visit, vitals and flowsheets obtained on patients.      Patient received paperwork for advance directive during previous visit but has not completed at this time     Reviewed record In preparation for visit, huddled with provider and have obtained necessary documentation      Health Maintenance Due   Topic    DTaP/Tdap/Td series (1 - Tdap)    PAP AKA CERVICAL CYTOLOGY     BREAST CANCER SCRN MAMMOGRAM     Influenza Age 5 to Adult        Wt Readings from Last 3 Encounters:   19 145 lb 12.8 oz (66.1 kg)   19 146 lb (66.2 kg)   19 144 lb 6.4 oz (65.5 kg)     Temp Readings from Last 3 Encounters:   19 98.1 °F (36.7 °C) (Oral)   19 98.2 °F (36.8 °C) (Oral)   19 98.2 °F (36.8 °C) (Oral)     BP Readings from Last 3 Encounters:   19 140/90   19 113/78   19 130/84     Pulse Readings from Last 3 Encounters:   19 79   19 79   19 83     Vitals:    19 0943   Resp: 14   Weight: 145 lb 12.8 oz (66.1 kg)   Height: 5' 6\" (1.676 m)   PainSc:   0 - No pain         Learning Assessment:  :     Learning Assessment 2018   PRIMARY LEARNER Patient Patient Patient Patient Patient   HIGHEST LEVEL OF EDUCATION - PRIMARY LEARNER  - 4 YEARS OF COLLEGE - > 4 YEARS OF COLLEGE > 4 YEARS OF COLLEGE   BARRIERS PRIMARY LEARNER - NONE - NONE NONE   CO-LEARNER CAREGIVER - No - No No   PRIMARY LANGUAGE ENGLISH ENGLISH ENGLISH ENGLISH ENGLISH   LEARNER PREFERENCE PRIMARY READING READING READING PICTURES DEMONSTRATION     - - - - READING   ANSWERED BY patient patient patient self self   RELATIONSHIP SELF SELF SELF SELF SELF       Depression Screening:  :     3 most recent PHQ Screens 11/19/2019   Little interest or pleasure in doing things Not at all   Feeling down, depressed, irritable, or hopeless Not at all   Total Score PHQ 2 0       Fall Risk Assessment:  :     No flowsheet data found. Abuse Screening:  :     No flowsheet data found. ADL Screening:  :     ADL Assessment 12/21/2019   Feeding yourself No Help Needed   Getting from bed to chair No Help Needed   Getting dressed No Help Needed   Bathing or showering No Help Needed   Walk across the room (includes cane/walker) No Help Needed   Using the telphone No Help Needed   Taking your medications No Help Needed   Preparing meals No Help Needed   Managing money (expenses/bills) No Help Needed   Moderately strenuous housework (laundry) No Help Needed   Shopping for personal items (toiletries/medicines) No Help Needed   Shopping for groceries No Help Needed   Driving No Help Needed   Climbing a flight of stairs No Help Needed   Getting to places beyond walking distances No Help Needed         Medication reconciliation up to date and corrected with patient at this time.

## 2019-12-21 NOTE — PROGRESS NOTES
Subjective:   Abram Doyle is a 60 y.o. female who complains of congestion, sore throat, nasal blockage, post nasal drip, dry cough, headache and bilateral sinus pain for 5 days, gradually worsening since that time. She denies a history of shortness of breath and wheezing. Evaluation to date: none. Treatment to date: OTC products. Patient does not smoke cigarettes. Relevant PMH: No pertinent additional PMH. 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    Temporal pain R51    Pancytopenia ([facility]) D61.818    Atrophic arthritis ([address] M06.9    Urinary tract infection without hematuria N39.0    Occipital pain R51     Patient Active Problem List    Diagnosis Date Noted    Occipital pain 04/04/2018    Pancytopenia ([address] 09/16/2017    Atrophic arthritis ([address] 09/16/2017    Urinary tract infection without hematuria 09/16/2017    Temporal pain 06/27/2016    Supraorbital neuralgia 03/21/2016    Chronic sinusitis 01/26/2016    Asthma 11/20/2015    HTN (hypertension) 10/25/2012    Cervical disc herniation 10/25/2012    RA [facility]heumatoid arthritis) [facility] 10/25/2012    Insomnia 10/25/2012    Lumbar back pain 10/25/2012    Gluten-sensitive enteropathy 10/25/2012     Current Outpatient Medications   Medication Sig Dispense Refill    doxycycline (ADOXA) 100 mg tablet Take 1 Tab by mouth two (2) times a day. 20 Tab 0    SUMAtriptan succinate (ZEMBRACE SYMTOUCH) 3 mg/0.5 mL pnij 1 at HA onset and repeat in 1 hour x 1 prn  Max 4 in 24 hours 8 Syringe 6    fremanezumab-vfrm (AJOVY) 225 mg/1.5 mL syrg 675 mg by SubCUTAneous route every three (3) months.  Indications: Migraine Prevention 3 Syringe 1    eletriptan (RELPAX) 40 mg tablet TAKE ONE TABLET BY MOUTH AT ONSET OF HEADACHE. MAY REPEAT IN 2 HOURS IF NEEDED. **DO NOT TAKE MORE THAN 2 TABELTS PER DAY** 9 Tab 11    losartan (COZAAR) 50 mg tablet TAKE ONE TABLET BY MOUTH DAILY (Patient taking differently: TAKE 1/2 TABLET BY MOUTH DAILY) 90 Tab 0    VENTOLIN HFA 90 mcg/actuation inhaler INHALE TWO PUFFS BY MOUTH EVERY 4 HOURS AS NEEDED 1 Inhaler 4    fluticasone (FLOVENT HFA) 110 mcg/actuation inhaler Take 2 Puffs by inhalation two (2) times a day. 1 Inhaler 5    EPINEPHrine (EPIPEN) 0.3 mg/0.3 mL injection       hydroxychloroquine (PLAQUENIL) 200 mg tablet Take 200 mg by mouth daily.  albuterol (PROVENTIL VENTOLIN) 2.5 mg /3 mL (0.083 %) nebu       HYDROcodone-acetaminophen (NORCO) 5-325 mg per tablet Take 1 Tab by mouth every four (4) hours as needed.  cetirizine (ZYRTEC) 10 mg tablet Take  by mouth.  citalopram (CELEXA) 20 mg tablet Take 20 mg by mouth daily.  INFLIXIMAB (REMICADE IV) by IntraVENous route every six (6) weeks.  azelastine-fluticasone (DYMISTA) 137-50 mcg/spray spry by Nasal route.  ascorbic acid (VITAMIN C) 500 mg tablet Take 1,000 mg by mouth daily.  cholecalciferol, vitamin D3, (VITAMIN D3) 2,000 unit Tab Take 2 tablets by mouth daily.  cyanocobalamin (VITAMIN B-12) 1,000 mcg tablet Take 1,000 mcg by mouth daily.  B.infantis-B.ani-B.long-B.bifi (PROBIOTIC 4X) 10-15 mg Tab Take 2 tablets by mouth daily.        Allergies   Allergen Reactions    Azithromycin Rash    Carafate [Sucralfate] Swelling    Cephalexin Hcl Rash    Enbrel [Etanercept] Other (comments)     Blisters at injection sites    Penicillin G Rash    Protonix [Pantoprazole] Swelling    Tamiflu [Oseltamivir Phosphate] Swelling     Past Medical History:   Diagnosis Date    Anxiety 10/25/2012    Fatigue     Headache     HTN (hypertension) 10/25/2012    Insomnia 10/25/2012    Lumbar back pain 10/25/2012    Steroid injections,  Dr. Roxie Cazares      RA [facility]) ([facility] 10/25/2012    Skipped beats      Past Surgical History:   Procedure Laterality Date    HX BREAST REDUCTION      HX  SECTION      2    HX HEENT      deviated septum    HX ORTHOPAEDIC      R metatarsal joint fusion    NEUROLOGICAL PROCEDURE UNLISTED      lumbar radio frequency neurotomy     Family History   Problem Relation Age of Onset    Hypertension Mother    [facility] Asthma Mother     Arthritis-rheumatoid Mother     Asthma Maternal Aunt     Diabetes Maternal Grandfather     Asthma Other     Other Other         scleroderma    Cancer Other     Migraines Other     Stroke Other      Social History     Tobacco Use    Smoking status: Never Smoker    Smokeless tobacco: Never Used   Substance Use Topics    Alcohol use: Yes     Comment: socially        Review of Systems  Pertinent items are noted in HPI. Objective:     Visit Vitals  /80 (BP 1 Location: Left arm, BP Patient Position: Sitting)   Pulse 79   Temp 98.1 °F (36.7 °C) (Oral)   Resp 14   Ht 5' 6\" (1.676 m)   Wt 145 lb 12.8 oz (66.1 kg)   SpO2 96%   BMI 23.53 kg/m²     General:  alert, cooperative, no distress   Eyes: negative   Ears: abnormal TM AD - erythematous, bulging, abnormal TM AS - erythematous, bulging   Sinuses: tenderness over bilateral maxillary   Mouth:  Lips, mucosa, and tongue normal. Teeth and gums normal and abnormal findings: marked oropharyngeal erythema   Neck: supple, symmetrical, trachea midline and no adenopathy. Heart: S1 and S2 normal, no murmurs noted. Lungs: clear to auscultation bilaterally   Abdomen: soft, non-tender. Bowel sounds normal. No masses,  no organomegaly        Assessment/Plan:   sinusitis  Discussed the dx and tx of sinusitis. Suggested symptomatic OTC remedies. Antibiotics per orders. RTC prn. ICD-10-CM ICD-9-CM    1. Acute maxillary sinusitis, recurrence not specified J01.00 461.0 doxycycline (ADOXA) 100 mg tablet   .

## 2019-12-21 NOTE — PATIENT INSTRUCTIONS
Stopping Smoking: Care Instructions  Your Care Instructions  Cigarette smokers crave the nicotine in cigarettes. Giving it up is much harder than simply changing a habit. Your body has to stop craving the nicotine. It is hard to quit, but you can do it. There are many tools that people use to quit smoking. You may find that combining tools works best for you. There are several steps to quitting. First you get ready to quit. Then you get support to help you. After that, you learn new skills and behaviors to become a nonsmoker. For many people, a necessary step is getting and using medicine. Your doctor will help you set up the plan that best meets your needs. You may want to attend a smoking cessation program to help you quit smoking. When you choose a program, look for one that has proven success. Ask your doctor for ideas. You will greatly increase your chances of success if you take medicine as well as get counseling or join a cessation program.  Some of the changes you feel when you first quit tobacco are uncomfortable. Your body will miss the nicotine at first, and you may feel short-tempered and grumpy. You may have trouble sleeping or concentrating. Medicine can help you deal with these symptoms. You may struggle with changing your smoking habits and rituals. The last step is the tricky one: Be prepared for the smoking urge to continue for a time. This is a lot to deal with, but keep at it. You will feel better. Follow-up care is a key part of your treatment and safety. Be sure to make and go to all appointments, and call your doctor if you are having problems. It's also a good idea to know your test results and keep a list of the medicines you take. How can you care for yourself at home? · Ask your family, friends, and coworkers for support. You have a better chance of quitting if you have help and support.   · Join a support group, such as Nicotine Anonymous, for people who are trying to quit smoking. · Consider signing up for a smoking cessation program, such as the American Lung Association's Freedom from Smoking program.  · Get text messaging support. Go to the website at www.smokefree. gov to sign up for the [facility] program.  · Set a quit date. Pick your date carefully so that it is not right in the middle of a big deadline or stressful time. Once you quit, do not even take a puff. Get rid of all ashtrays and lighters after your last cigarette. Clean your house and your clothes so that they do not smell of smoke. · Learn how to be a nonsmoker. Think about ways you can avoid those things that make you reach for a cigarette. ? Avoid situations that put you at greatest risk for smoking. For some people, it is hard to have a drink with friends without smoking. For others, they might skip a coffee break with coworkers who smoke. ? Change your daily routine. Take a different route to work or eat a meal in a different place. · Cut down on stress. Calm yourself or release tension by doing an activity you enjoy, such as reading a book, taking a hot bath, or gardening. · Talk to your doctor or pharmacist about nicotine replacement therapy, which replaces the nicotine in your body. You still get nicotine but you do not use tobacco. Nicotine replacement products help you slowly reduce the amount of nicotine you need. These products come in several forms, many of them available over-the-counter:  ? Nicotine patches  ? Nicotine gum and lozenges  ? Nicotine inhaler  · Ask your doctor about bupropion (Wellbutrin) or varenicline (Chantix), which are prescription medicines. They do not contain nicotine. They help you by reducing withdrawal symptoms, such as stress and anxiety. · Some people find hypnosis, acupuncture, and massage helpful for ending the smoking habit. · Eat a healthy diet and get regular exercise. Having healthy habits will help your body move past its craving for nicotine.   · Be prepared to keep trying. Most people are not successful the first few times they try to quit. Do not get mad at yourself if you smoke again. Make a list of things you learned and think about when you want to try again, such as next week, next month, or next year. Where can you learn more? Go to http://geraldo-samuel.info/. Enter P167 in the search box to learn more about \"Stopping Smoking: Care Instructions. \"  Current as of: September 26, 2018  Content Version: 12.2  © 0547-1649 [employer]. Care instructions adapted under license by [employer] (which disclaims liability or warranty for this information). If you have questions about a medical condition or this instruction, always ask your healthcare professional. [address] any warranty or liability for your use of this information. Sinusitis: Care Instructions  Your Care Instructions    Sinusitis is an infection of the lining of the sinus cavities in your head. Sinusitis often follows a cold. It causes pain and pressure in your head and face. In most cases, sinusitis gets better on its own in 1 to 2 weeks. But some mild symptoms may last for several weeks. Sometimes antibiotics are needed. Follow-up care is a key part of your treatment and safety. Be sure to make and go to all appointments, and call your doctor if you are having problems. It's also a good idea to know your test results and keep a list of the medicines you take. How can you care for yourself at home? · Take an over-the-counter pain medicine, such as acetaminophen (Tylenol), ibuprofen (Advil, Motrin), or naproxen (Aleve). Read and follow all instructions on the label. · If the doctor prescribed antibiotics, take them as directed. Do not stop taking them just because you feel better. You need to take the full course of antibiotics. · Be careful when taking over-the-counter cold or flu medicines and Tylenol at the same time.  Many of these medicines have acetaminophen, which is Tylenol. Read the labels to make sure that you are not taking more than the recommended dose. Too much acetaminophen (Tylenol) can be harmful. · Breathe warm, moist air from a steamy shower, a hot bath, or a sink filled with hot water. Avoid cold, dry air. Using a humidifier in your home may help. Follow the directions for cleaning the machine. · Use saline (saltwater) nasal washes to help keep your nasal passages open and wash out mucus and bacteria. You can buy saline nose drops at a grocery store or drugstore. Or you can make your own at home by adding 1 teaspoon of salt and 1 teaspoon of baking soda to 2 cups of distilled water. If you make your own, fill a bulb syringe with the solution, insert the tip into your nostril, and squeeze gently. Marcellina Artur your nose. · Put a hot, wet towel or a warm gel pack on your face 3 or 4 times a day for 5 to 10 minutes each time. · Try a decongestant nasal spray like oxymetazoline (Afrin). Do not use it for more than 3 days in a row. Using it for more than 3 days can make your congestion worse. When should you call for help? Call your doctor now or seek immediate medical care if:    · You have new or worse swelling or redness in your face or around your eyes.     · You have a new or higher fever.    Watch closely for changes in your health, and be sure to contact your doctor if:    · You have new or worse facial pain.     · The mucus from your nose becomes thicker (like pus) or has new blood in it.     · You are not getting better as expected. Where can you learn more? Go to http://geraldo-samuel.info/. Enter W546 in the search box to learn more about \"Sinusitis: Care Instructions. \"  Current as of: October 21, 2018  Content Version: 12.2  © 2851-2360 [employer]. Care instructions adapted under license by [employer] (which disclaims liability or warranty for this information).  If you have questions about a medical condition or this instruction, always ask your healthcare professional. [place] [address] any warranty or liability for your use of this information.

## 2020-01-02 ENCOUNTER — TELEPHONE (OUTPATIENT)
Dept: NEUROLOGY | Age: 60
End: 2020-01-02

## 2020-01-02 NOTE — TELEPHONE ENCOUNTER
Pharmacy called to check the dose/directions  for  fremanezumab-vfrm (AJOVY) 225 mg/1.5 mL syrg [[id number]]     pharmacy stated that this is the dose and directions-- 675 mg by SubCUTAneous route every three (3) months.  Indications: Migraine Prevention   if the nurse could call pharmacy

## 2020-01-03 NOTE — TELEPHONE ENCOUNTER
S/w pharmacy and verified Ajovy dose to be 675 mg every 3 mo.   They state PA will be needed for this dose

## 2020-01-08 NOTE — TELEPHONE ENCOUNTER
Prior Authorization NOT REQUIRED for Ajovy, according to [facility] via [facility] Med[facility]. [facility] system responded to auth request with message stating, \"Your PA has been resolved, no additional PA is required. \"    [facility] Key:  [id number]

## 2020-01-08 NOTE — TELEPHONE ENCOUNTER
Prior Authorization submitted for Ajovy to [employer] via Cover My Meds. Status Pending. Allow 24-72 hours for response.      CM Key: [id number]

## 2020-01-20 DIAGNOSIS — G43.011 INTRACTABLE MIGRAINE WITHOUT AURA AND WITH STATUS MIGRAINOSUS: ICD-10-CM

## 2020-01-20 RX ORDER — FREMANEZUMAB-VFRM 225 MG/1.5ML
INJECTION SUBCUTANEOUS
Qty: 4.5 ML | Refills: 0 | Status: SHIPPED | OUTPATIENT
Start: 2020-01-20 | End: 2020-01-30 | Stop reason: SDUPTHER

## 2020-01-28 DIAGNOSIS — G43.011 INTRACTABLE MIGRAINE WITHOUT AURA AND WITH STATUS MIGRAINOSUS: ICD-10-CM

## 2020-01-28 RX ORDER — ELETRIPTAN HYDROBROMIDE 40 MG/1
TABLET, FILM COATED ORAL
Qty: 6 TAB | Refills: 10 | Status: SHIPPED | OUTPATIENT
Start: 2020-01-28 | End: 2021-04-10

## 2020-01-30 ENCOUNTER — TELEPHONE (OUTPATIENT)
Dept: NEUROLOGY | Age: 60
End: 2020-01-30

## 2020-01-30 DIAGNOSIS — G43.011 INTRACTABLE MIGRAINE WITHOUT AURA AND WITH STATUS MIGRAINOSUS: ICD-10-CM

## 2020-03-16 ENCOUNTER — OFFICE VISIT (OUTPATIENT)
Dept: NEUROLOGY | Age: 60
End: 2020-03-16

## 2020-03-16 VITALS
WEIGHT: 145 LBS | DIASTOLIC BLOOD PRESSURE: 84 MMHG | TEMPERATURE: 98.4 F | HEIGHT: 66 IN | OXYGEN SATURATION: 98 % | SYSTOLIC BLOOD PRESSURE: 130 MMHG | HEART RATE: 76 BPM | BODY MASS INDEX: 23.3 KG/M2 | RESPIRATION RATE: 16 BRPM

## 2020-03-16 DIAGNOSIS — G43.001 MIGRAINE WITHOUT AURA AND WITH STATUS MIGRAINOSUS, NOT INTRACTABLE: ICD-10-CM

## 2020-03-16 DIAGNOSIS — R20.2 TINGLING IN EXTREMITIES: Primary | ICD-10-CM

## 2020-03-16 DIAGNOSIS — R20.2 TINGLING IN EXTREMITIES: ICD-10-CM

## 2020-03-16 RX ORDER — ERENUMAB-AOOE 140 MG/ML
140 INJECTION, SOLUTION SUBCUTANEOUS
Qty: 1 EACH | Refills: 5 | Status: SHIPPED | OUTPATIENT
Start: 2020-03-16 | End: 2021-01-25

## 2020-03-16 RX ORDER — GABAPENTIN 100 MG/1
1 CAPSULE ORAL DAILY
COMMUNITY
Start: 2020-03-13 | End: 2021-09-21

## 2020-03-16 NOTE — ADDENDUM NOTE
Addended by: Cisco Joy on: 3/16/2020 04:26 PM     Modules accepted: Orders Alert and oriented to person, place and time

## 2020-03-16 NOTE — LETTER
3/16/20 Patient: Luciano Cruz YOB: 1960 Date of Visit: 3/16/2020 Michael Howell MD 
[id number] [facility] [id number] VIA Facsimile: [phone] Dear Michael Howell MD, Thank you for referring Ms. Augustus Shin to [facility] for evaluation. My notes for this consultation are attached. If you have questions, please do not hesitate to call me. I look forward to following your patient along with you. Sincerely, Ron Bloch, MD

## 2020-03-16 NOTE — PROGRESS NOTES
Chief Complaint   Patient presents with    Migraine     3 mo      Just started gabapentin for neuropathy    Migraines doing okay still has about 9 migraines per mo, has had two rounds of Ajovy 675 mg.   Last dose almost 3 mo ago, last Zembrace dose was about 1.5 wks ago

## 2020-03-16 NOTE — PROGRESS NOTES
[facility] and [address] at [facility] Neurology Clinics at [address] [facility], [address] [facility] [address] [facility], [address] [place] [address]   [phone]              Chief Complaint   Patient presents with    Migraine     3 mo      Current Outpatient Medications   Medication Sig Dispense Refill    fremanezumab-vfrm (AJOVY) 225 mg/1.5 mL syrg 675 mg by SubCUTAneous route every three (3) months for 180 days. 3 Syringe 1    eletriptan (RELPAX) 40 mg tablet TAKE ONE TABLET BY MOUTH AT ONSET OF HEADACHE; MAY REPEAT ONE TABLET IN 2 HOURS IF NEEDED. MAX OF 2 TABLETS A DAY 6 Tab 10    SUMAtriptan succinate (ZEMBRACE SYMTOUCH) 3 mg/0.5 mL pnij 1 at HA onset and repeat in 1 hour x 1 prn  Max 4 in 24 hours 8 Syringe 6    hydroxychloroquine (PLAQUENIL) 200 mg tablet Take 200 mg by mouth daily.  albuterol (PROVENTIL VENTOLIN) 2.5 mg /3 mL (0.083 %) nebu       HYDROcodone-acetaminophen (NORCO) 5-325 mg per tablet Take 1 Tab by mouth every four (4) hours as needed.  cetirizine (ZYRTEC) 10 mg tablet Take  by mouth.  INFLIXIMAB (REMICADE IV) by IntraVENous route every six (6) weeks.  azelastine-fluticasone (DYMISTA) 137-50 mcg/spray spry by Nasal route.  VENTOLIN HFA 90 mcg/actuation inhaler INHALE TWO PUFFS BY MOUTH EVERY 4 HOURS AS NEEDED 1 Inhaler 4    fluticasone (FLOVENT HFA) 110 mcg/actuation inhaler Take 2 Puffs by inhalation two (2) times a day. 1 Inhaler 5    ascorbic acid (VITAMIN C) 500 mg tablet Take 1,000 mg by mouth daily.  cholecalciferol, vitamin D3, (VITAMIN D3) 2,000 unit Tab Take 2 tablets by mouth daily.  gabapentin (NEURONTIN) 100 mg capsule Take 1 Cap by mouth daily.  doxycycline (ADOXA) 100 mg tablet Take 1 Tab by mouth two (2) times a day.  20 Tab 0    losartan (COZAAR) 50 mg tablet TAKE ONE TABLET BY MOUTH DAILY (Patient taking differently: Take 50 mg by mouth daily.) 90 Tab 0    B.infantis-B.ani-B.long-B.bifi (PROBIOTIC 4X) 10-15 mg Tab Take 2 tablets by mouth daily. Allergies   Allergen Reactions    Azithromycin Rash    Carafate [Sucralfate] Swelling    Cephalexin Hcl Rash    Enbrel [Etanercept] Other (comments)     Blisters at injection sites    Penicillin G Rash    Protonix [Pantoprazole] Swelling    Tamiflu [Oseltamivir Phosphate] Swelling     Social History     Tobacco Use    Smoking status: Never Smoker    Smokeless tobacco: Never Used   Substance Use Topics    Alcohol use: Yes     Comment: socially    Drug use: No   Patient returns today for follow-up. She is a nice 54-year-old lady who I have been following for intractable migraine headaches. On her last visit in December we continued her Ajovy. At that point she was Having about 5 headache days per month and was a bit worse and we decided to stay the course with quarterly injections of Ajovy the next dose being due in January and then I was to see her in March and we would decide how to proceed depending upon her clinical course. She returns today and her migraines are no better. She still going through all of her abortive medicine per month. She is using more injections. She is just not getting the same effect she had with Aimovig. Her insurance company at first would not cover the Ivanna Stanton. We discussed that since she has failed Ajovy we will try to get that reauthorize. She has new complaint today and that is numbness and tingling in her arms and legs and feet. This has been progressive over the last 6 months. She was just started on some Neurontin by rheumatology. She was told that it was thought she may have neuropathy. She has not had EMG. No weakness. No hemibody numbness or tingling. No palpitations or chest pain. No shortness of breath. No fever. No chills. No rash.     Review of systems  Pertinent positives and negatives as noted with remainder of comprehensive review negative    Examination  Visit Vitals  /84 (BP 1 Location: Left arm, BP Patient Position: Sitting)   Pulse 76   Temp 98.4 °F (36.9 °C) (Oral)   Resp 16   Ht 5' 6\" (1.676 m)   Wt 65.8 kg (145 lb)   SpO2 98%   BMI 23.40 kg/m²   Lady. Awake alert oriented and conversant. Speech and language normal.  No icterus. No edema. She has intact cranial nerves II-XII. No ataxia. Graded sensory loss to above the ankles bilaterally and symmetrically to primary modalities. Impression/Plan  Intractable migraine headaches refractory to Ajovy but had a good response to Aimovig in the past.  We can go and give her 140 mg Aimovig injection in the office today. We will then have that sent to her pharmacy to trigger a prior Auth and we can argue that she has failed Ajovy with a 6-month trial and see if they will cover that. If not hopefully the Aimovig savings card will cover that for her. Continue with our current abortive strategy    New complaint today of numbness and tingling in her extremities. We will get an EMG. If the EMG is normal then we will proceed with a skin biopsy and autonomic nervous system testing looking for small fiber neuropathy. If the EMG does indeed demonstrate peripheral neuropathy then we will send her for a multitude of laboratory analyses looking for potential etiology although this could perhaps be on the basis of her rheumatoid. Discussed all this today with her at length. She will follow-up with us after her EMG    Odette Favre, MD      This note was created using voice recognition software. Despite editing, there may be syntax errors. This note will not be viewable in [address]

## 2020-03-19 ENCOUNTER — OFFICE VISIT (OUTPATIENT)
Dept: NEUROLOGY | Age: 60
End: 2020-03-19

## 2020-03-19 VITALS
TEMPERATURE: 98.8 F | BODY MASS INDEX: 23.31 KG/M2 | SYSTOLIC BLOOD PRESSURE: 110 MMHG | DIASTOLIC BLOOD PRESSURE: 78 MMHG | WEIGHT: 145.06 LBS | RESPIRATION RATE: 14 BRPM | HEIGHT: 66 IN | OXYGEN SATURATION: 98 % | HEART RATE: 84 BPM

## 2020-03-19 VITALS — TEMPERATURE: 98.2 F

## 2020-03-19 DIAGNOSIS — R20.2 TINGLING IN EXTREMITIES: Primary | ICD-10-CM

## 2020-03-19 NOTE — PROCEDURES
EMG/ NCS Report  DRUG REHABILITATION  - DAY ONE [facility]IDENCE  [facility]  [facility], [address] [place] [address] [address], [address]   Ph: [phone]   FAX: [phone]/ [phone]  Test Date:  3/19/2020    Patient: Gwendolyn Riley : 1960 Physician: Tamanna Green MD   Sex: Female Height: ' \" Ref Phys: Nena Lang MD   ID#: [id number] Weight:  lbs. Technician: Dave Winston     Patient History / Exam:  Patient is coming for 6 months intermittent numbness of both upper and lower extremities. (-) weakness (+) rheumatoid arthritis. Patient is coming for neuropathy evaluation. EMG & NCV Findings:  Evaluation of the left Fibular motor and the right Fibular motor nerves showed normal distal onset latency (L5.0, R3.4 ms), normal amplitude (L4.4, R6.7 mV), normal conduction velocity (B Fib-Ankle, L47, R46 m/s), and normal conduction velocity (Poplt-B Fib, L45, R59 m/s). The left tibial motor and the right tibial motor nerves showed normal distal onset latency (L3.5, R3.0 ms), normal amplitude (L10.3, R12.3 mV), and normal conduction velocity (Knee-Ankle, L47, R46 m/s). The left Sup Fibular sensory and the right Sup Fibular sensory nerves showed normal distal peak latency (L2.6, R2.3 ms), normal amplitude (L4.8, R5.2 µV), and normal conduction velocity (Lower leg-Lat ankle, L43, R59 m/s). The left sural sensory and the right sural sensory nerves showed normal distal peak latency (L3.8, R3.7 ms) and normal amplitude (L10.6, R16.3 µV). All F Wave latencies were within normal limits. All F Wave left vs. right side latency differences were within normal limits. All H Reflex left vs. right side latency differences were within normal limits. All examined muscles (as indicated in the following table) showed no evidence of electrical instability.         Impression:    Extensive electrodiagnostic examination of the right and left lower extremities is normal.    Specifically, there is no evidence of a peripheral neuropathy or lumbosacral motor radiculopathy.       Josi No MD  Diplomate, American Board of Psychiatry and Neurology  Diplomate, Neuromuscular Medicine  Diplomate, American Board of Electrodiagnostic Medicine  Director, [address] [place] [address] Accredited Laboratory with Exemplary Status          Nerve Conduction Studies  Anti Sensory Summary Table     Stim Site NR Peak (ms) Norm Peak (ms) P-T Amp (µV) Norm P-T Amp Site1 Site2 Dist (cm)   Left Sup Fibular Anti Sensory (Lat ankle)  29.3°C   Lower leg    2.6 <4.6 4.8 >4 Lower leg Lat ankle 10.0   Right Sup Fibular Anti Sensory (Lat ankle)  29.3°C   Lower leg    2.3 <4.6 5.2 >4 Lower leg Lat ankle 10.0   Left Sural Anti Sensory (Lat Mall)  30°C   Calf    3.8 <4.5 10.6 >4.0 Calf Lat Mall 14.0   Right Sural Anti Sensory (Lat Mall)  30°C   Calf    3.7 <4.5 16.3 >4.0 Calf Lat Mall 14.0     Motor Summary Table     Stim Site NR Onset (ms) Norm Onset (ms) O-P Amp (mV) Norm O-P Amp Amp (Prev) (%) Site1 Site2 Dist (cm) Mikhail (m/s) Norm Mikhail (m/s)   Left Fibular Motor (Ext Dig Brev)  29.1°C   Ankle    5.0 <6.5 4.4 >1.1 100.0 Ankle Ext Dig Brev 8.0     B Fib    11.6  4.0  90.9 B Fib Ankle 31.0 47 >38   Poplt    13.8  3.9  97.5 Poplt B Fib 10.0 45 >42   Right Fibular Motor (Ext Dig Brev)  29.3°C   Ankle    3.4 <6.5 6.7 >1.1 100.0 Ankle Ext Dig Brev 8.0     B Fib    9.9  6.0  89.6 B Fib Ankle 30.0 46 >38   Poplt    11.6  5.9  98.3 Poplt B Fib 10.0 59 >42   Left Tibial Motor (Abd Suarez Brev)  28.8°C   Ankle    3.5 <6.1 10.3 >1.1 100.0 Ankle Abd Suarez Brev 8.0     Knee    11.6  8.5  82.5 Knee Ankle 38.0 47 >39   Right Tibial Motor (Abd Suarez Brev)  29.1°C   Ankle    3.0 <6.1 12.3 >1.1 100.0 Ankle Abd Suarez Brev 8.0     Knee    11.3  10.4  84.6 Knee Ankle 38.0 46 >39     F Wave Studies     NR F-Lat (ms) Lat Norm (ms) L-R F-Lat (ms) L-R Lat Norm   Left Tibial (Mrkrs) (Abd Hallucis)  28.8°C      53.48 <56 2.23 <5.7   Right Tibial (Mrkrs) (Abd Hallucis)  29°C      51.25 <56 2.23 <5.7     H Reflex Studies     NR H-Lat (ms) L-R H-Lat (ms) L-R Lat Norm   Left Tibial (Gastroc)  28.8°C      31.04 0.00 <2.0   Right Tibial (Gastroc)  29°C      31.04 0.00 <2.0     EMG     Side Muscle Nerve Root Ins Act Fibs Psw Recrt Duration Amp Poly Comment   Left Ext Dig Brev Dp Br Peron L5, S1 Nml Nml Nml Nml Nml Nml Nml    Left AbdHallucis MedPlantar S1-2 Nml Nml Nml Nml Nml Nml Nml    Left AntTibialis Dp Br Peron L4-5 Nml Nml Nml Nml Nml Nml Nml    Left MedGastroc Tibial S1-2 Nml Nml Nml Nml Nml Nml Nml    Left VastusLat Femoral L2-4 Nml Nml Nml Nml Nml Nml Nml    Right Ext Dig Brev Dp Br Peron L5, S1 Nml Nml Nml Nml Nml Nml Nml    Right AbdHallucis MedPlantar S1-2 Nml Nml Nml Nml Nml Nml Nml    Right AntTibialis Dp Br Peron L4-5 Nml Nml Nml Nml Nml Nml Nml    Right MedGastroc Tibial S1-2 Nml Nml Nml Nml Nml Nml Nml    Right VastusLat Femoral L2-4 Nml Nml Nml Nml Nml Nml Nml    Right GluteusMed SupGluteal L4-S1 Nml Nml Nml Nml Nml Nml Nml    Right Lower Lumb Parasp Rami L5,S1 Nml Nml Nml Nml Nml Nml Nml                Nerve Conduction Studies  Anti Sensory Left/Right Comparison     Stim Site L Lat (ms) R Lat (ms) L-R Lat (ms) L Amp (µV) R Amp (µV) L-R Amp (%) Site1 Site2 L Mikhail (m/s) R Mikhail (m/s) L-R Mikhail (m/s)   Sup Fibular Anti Sensory (Lat ankle)  29.3°C   Lower leg 2.3 1.7 0.6 4.8 5.2 7.7 Lower leg Lat ankle 43 59 16   Sural Anti Sensory (Lat Mall)  30°C   Calf 3.1 2.9 0.2 10.6 16.3 35.0 Calf Lat Mall 45 48 3     Motor Left/Right Comparison     Stim Site L Lat (ms) R Lat (ms) L-R Lat (ms) L Amp (mV) R Amp (mV) L-R Amp (%) Site1 Site2 L Mikhail (m/s) R Mikhail (m/s) L-R Mikhail (m/s)   Fibular Motor (Ext Dig Brev)  29.1°C   Ankle 5.0 3.4 1.6 4.4 6.7 34.3 Ankle Ext Dig Brev      B Fib 11.6 9.9 1.7 4.0 6.0 33.3 B Fib Ankle 47 46 1   Poplt 13.8 11.6 2.2 3.9 5.9 33.9 Poplt B Fib 45 59 14   Tibial Motor (Abd Suarez Brev)  28.8°C   Ankle 3.5 3.0 0.5 10.3 12.3 16.3 Ankle Abd Suarez Brev      Knee 11.6 11.3 0.3 8.5 10.4 18.3 Knee Ankle 47 46 1 Waveforms:

## 2020-03-19 NOTE — PROGRESS NOTES
[address] [facility] Neurology Clinics and [address] at [facility] Neurology Clinics at 42 [facility], [address] [place] [address] [address] [facility], [address] [place] [address]   [phone]              Chief Complaint   Patient presents with    Results     emg     Current Outpatient Medications   Medication Sig Dispense Refill    gabapentin (NEURONTIN) 100 mg capsule Take 1 Cap by mouth daily.  erenumab-aooe (Aimovig Autoinjector) 140 mg/mL injection 1 mL by SubCUTAneous route every thirty (30) days. 1 Each 5    eletriptan (RELPAX) 40 mg tablet TAKE ONE TABLET BY MOUTH AT ONSET OF HEADACHE; MAY REPEAT ONE TABLET IN 2 HOURS IF NEEDED. MAX OF 2 TABLETS A DAY 6 Tab 10    doxycycline (ADOXA) 100 mg tablet Take 1 Tab by mouth two (2) times a day. 20 Tab 0    SUMAtriptan succinate (ZEMBRACE SYMTOUCH) 3 mg/0.5 mL pnij 1 at HA onset and repeat in 1 hour x 1 prn  Max 4 in 24 hours 8 Syringe 6    hydroxychloroquine (PLAQUENIL) 200 mg tablet Take 200 mg by mouth daily.  albuterol (PROVENTIL VENTOLIN) 2.5 mg /3 mL (0.083 %) nebu       HYDROcodone-acetaminophen (NORCO) 5-325 mg per tablet Take 1 Tab by mouth every four (4) hours as needed.  cetirizine (ZYRTEC) 10 mg tablet Take  by mouth.  INFLIXIMAB (REMICADE IV) by IntraVENous route every six (6) weeks.  azelastine-fluticasone (DYMISTA) 137-50 mcg/spray spry by Nasal route.  losartan (COZAAR) 50 mg tablet TAKE ONE TABLET BY MOUTH DAILY (Patient taking differently: Take 50 mg by mouth daily.) 90 Tab 0    VENTOLIN HFA 90 mcg/actuation inhaler INHALE TWO PUFFS BY MOUTH EVERY 4 HOURS AS NEEDED 1 Inhaler 4    fluticasone (FLOVENT HFA) 110 mcg/actuation inhaler Take 2 Puffs by inhalation two (2) times a day. 1 Inhaler 5    ascorbic acid (VITAMIN C) 500 mg tablet Take 1,000 mg by mouth daily.  cholecalciferol, vitamin D3, (VITAMIN D3) 2,000 unit Tab Take 2 tablets by mouth daily.  B.infantis-B.ani-B.long-B.bifi (PROBIOTIC 4X) 10-15 mg Tab Take 2 tablets by mouth daily. Allergies   Allergen Reactions    Azithromycin Rash    Carafate [Sucralfate] Swelling    Cephalexin Hcl Rash    Enbrel [Etanercept] Other (comments)     Blisters at injection sites    Penicillin G Rash    Protonix [Pantoprazole] Swelling    Tamiflu [Oseltamivir Phosphate] Swelling     Social History     Tobacco Use    Smoking status: Never Smoker    Smokeless tobacco: Never Used   Substance Use Topics    Alcohol use: Yes     Comment: socially    Drug use: No     Patient returns today for follow-up. She is a 58-year-old lady who I last saw a few days ago and follow regularly for migraine. At that visit she had a new complaint of tingling in her arms and legs that was progressive. She just had her EMG and I discussed that with Dr Neil España and it was normal.  Still getting dysesthesias described as tingling in her feet as well as her hands. No inciting factor. No weakness    Examination  Visit Vitals  /78 (BP 1 Location: Left arm, BP Patient Position: Sitting)   Pulse 84   Temp 98.8 °F (37.1 °C) (Oral)   Resp 14   Ht 5' 6\" (1.676 m)   Wt 65.8 kg (145 lb 1 oz)   SpO2 98%   BMI 23.41 kg/m²     Pleasant lady. Awake alert oriented and conversant. Normal speech-language. Steady    Impression/Plan  Dysesthesias in the extremities without evidence of a large fiber neuropathy. This does not exclude a small fiber neuropathy. We discussed this. We discussed the next that would be to evaluate for small fiber neuropathy with skin biopsy and autonomic nervous system testing. If that is normal then we will proceed with a multitude of laboratory analyses looking for etiology. If it is abnormal then it will be something she will just have to live with.   She was given some gabapentin and we discussed not using that because we are not certain exactly what we are dealing with and she notes that she would rather not take something in addition to what she is already taking right now as her symptoms are somewhat tolerable. Ra Barry MD      This note was created using voice recognition software. Despite editing, there may be syntax errors. This note will not be viewable in [address]

## 2020-03-20 ENCOUNTER — TELEPHONE (OUTPATIENT)
Dept: NEUROLOGY | Age: 60
End: 2020-03-20

## 2020-03-20 NOTE — TELEPHONE ENCOUNTER
Aimovig 140 mg [place] initiated through Cover my meds (Key: [facility])  Rx #: [id number]    Approved until 6/20/20

## 2020-04-15 ENCOUNTER — OFFICE VISIT (OUTPATIENT)
Dept: PRIMARY CARE CLINIC | Age: 60
End: 2020-04-15

## 2020-04-15 VITALS
TEMPERATURE: 98.3 F | RESPIRATION RATE: 16 BRPM | OXYGEN SATURATION: 98 % | DIASTOLIC BLOOD PRESSURE: 96 MMHG | HEIGHT: 66 IN | HEART RATE: 77 BPM | SYSTOLIC BLOOD PRESSURE: 136 MMHG | WEIGHT: 140 LBS | BODY MASS INDEX: 22.5 KG/M2

## 2020-04-15 DIAGNOSIS — M79.645 PAIN OF LEFT THUMB: ICD-10-CM
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
## 2020-04-15 DIAGNOSIS — M25.532 LEFT WRIST PAIN: ICD-10-CM

## 2020-04-15 DIAGNOSIS — M18.12 OSTEOARTHRITIS OF LEFT THUMB: ICD-10-CM

## 2020-04-15 DIAGNOSIS — S60.222A CONTUSION OF LEFT HAND, INITIAL ENCOUNTER: Primary | ICD-10-CM

## 2020-04-15 RX ORDER — PREDNISONE 20 MG/1
TABLET ORAL
Qty: 10 TAB | Refills: 0 | Status: SHIPPED | OUTPATIENT
Start: 2020-04-15 | End: 2021-09-21

## 2020-04-15 NOTE — PATIENT INSTRUCTIONS
Arthritis: Care Instructions Your Care Instructions Arthritis, also called osteoarthritis, is a breakdown of the cartilage that cushions your joints. When the cartilage wears down, your bones rub against each other. This causes pain and stiffness. Many people have some arthritis as they age. Arthritis most often affects the joints of the spine, hands, hips, knees, or feet. You can take simple measures to protect your joints, ease your pain, and help you stay active. Follow-up care is a key part of your treatment and safety. Be sure to make and go to all appointments, and call your doctor if you are having problems. It's also a good idea to know your test results and keep a list of the medicines you take. How can you care for yourself at home? · Stay at a healthy weight. Being overweight puts extra strain on your joints. · Talk to your doctor or physical therapist about exercises that will help ease joint pain. ? Stretch. You may enjoy gentle forms of yoga to help keep your joints and muscles flexible. ? Walk instead of jog. Other types of exercise that are less stressful on the joints include riding a bicycle, swimming, aicha chi, or water exercise. ? Lift weights. Strong muscles help reduce stress on your joints. Stronger thigh muscles, for example, take some of the stress off of the knees and hips. Learn the right way to lift weights so you do not make joint pain worse. · Take your medicines exactly as prescribed. Call your doctor if you think you are having a problem with your medicine. · Take pain medicines exactly as directed. ? If the doctor gave you a prescription medicine for pain, take it as prescribed. ? If you are not taking a prescription pain medicine, ask your doctor if you can take an over-the-counter medicine. · Use a cane, crutch, walker, or another device if you need help to get around. These can help rest your joints.  You also can use other things to make life easier, such as a higher toilet seat and padded handles on kitchen utensils. · Do not sit in low chairs, which can make it hard to get up. · Put heat or cold on your sore joints as needed. Use whichever helps you most. You also can take turns with hot and cold packs. ? Apply heat 2 or 3 times a day for 20 to 30 minutesusing a heating pad, hot shower, or hot packto relieve pain and stiffness. ? Put ice or a cold pack on your sore joint for 10 to 20 minutes at a time. Put a thin cloth between the ice and your skin. When should you call for help? Call your doctor now or seek immediate medical care if: 
  · You have sudden swelling, warmth, or pain in any joint.  
  · You have joint pain and a fever or rash.  
  · You have such bad pain that you cannot use a joint.  
 Watch closely for changes in your health, and be sure to contact your doctor if: 
  · You have mild joint symptoms that continue even with more than 6 weeks of care at home.  
  · You have stomach pain or other problems with your medicine. Where can you learn more? Go to http://geraldo-samuel.info/ Enter F580 in the search box to learn more about \"Arthritis: Care Instructions. \" Current as of: December 8, 2019Content Version: 12.4 © 7808-2820 Healthwise, Incorporated. Care instructions adapted under license by [employer] (which disclaims liability or warranty for this information). If you have questions about a medical condition or this instruction, always ask your healthcare professional. [place] [address] any warranty or liability for your use of this information. Hand Bruises: Care Instructions Your Care Instructions Bruises, or contusions, can happen as a result of an impact or fall. Most people think of a bruise as a black-and-blue spot. This happens when small blood vessels get torn and leak blood under the skin.  The bruise may turn purplish black, reddish blue, or yellowish green as it heals. But bones and muscles can also get bruised. This may damage the hand but not cause a bruise that you can see. Most bruises aren't serious and will go away on their own in 2 to 4 weeks. But sometimes a more serious hand injury might not heal on its own. Tell your doctor if you have new symptoms or your injury is not getting better over time. You may have tests to see if you have bone or nerve damage. These tests may include X-rays, a CT scan, or an MRI. If you damaged bones or muscles, you may need more treatment. The doctor has checked you carefully, but problems can develop later. If you notice any problems or new symptoms, get medical treatment right away. Follow-up care is a key part of your treatment and safety. Be sure to make and go to all appointments, and call your doctor if you are having problems. It's also a good idea to know your test results and keep a list of the medicines you take. How can you care for yourself at home? · Put ice or a cold pack on the hand for 10 to 20 minutes at a time. Put a thin cloth between the ice and your skin. · Prop up your hand on a pillow when you ice it or anytime you sit or lie down during the next 3 days. Try to keep your hand above the level of your heart. This will help reduce swelling. · Be safe with medicines. Read and follow all instructions on the label. ? If the doctor gave you a prescription medicine for pain, take it as prescribed. ? If you are not taking a prescription pain medicine, ask your doctor if you can take an over-the-counter medicine. · Be sure to follow your doctor's advice about moving and exercising your injured hand. When should you call for help? Call your doctor now or seek immediate medical care if: 
  · Your pain gets worse.  
  · You have new or worse swelling.  
  · You have tingling, weakness, or numbness in the area near the bruise.   · The area near the bruise is cold or pale.  
  · You have symptoms of infection, such as: 
? Increased pain, swelling, warmth, or redness. ? Red streaks leading from the area. ? Pus draining from the area. ? A fever.  
 Watch closely for changes in your health, and be sure to contact your doctor if: 
  · You do not get better as expected. Where can you learn more? Go to http://geraldo-samuel.info/ Enter O184 in the search box to learn more about \"Hand Bruises: Care Instructions. \" Current as of: June 26, 2019Content Version: 12.4 © 4475-9783 Healthwise, Incorporated. Care instructions adapted under license by [employer] (which disclaims liability or warranty for this information). If you have questions about a medical condition or this instruction, always ask your healthcare professional. [address] any warranty or liability for your use of this information.

## 2020-04-15 NOTE — PROGRESS NOTES
RM 4    Chief Complaint   Patient presents with    Wrist Pain     left wrist x 1 1/2 weeks fell on wrist and thumb.  Hurts to twist wrist       Visit Vitals  BP (!) 136/96 (BP 1 Location: Left arm, BP Patient Position: Sitting)   Pulse 77   Temp 98.3 °F (36.8 °C) (Oral)   Resp 16   Ht 5' 6\" (1.676 m)   Wt 140 lb (63.5 kg)   SpO2 98%   BMI 22.60 kg/m²

## 2020-04-15 NOTE — PROGRESS NOTES
Subjective:      Pili Rudd is a 60 y.o. female seen for evaluation and treatment of a left wrist injury. This is evaluated as a personal injury. The injury was sustained 10 days ago, and occurred while stepping off the bottom step of a step ladder. As she was stepping off she stepped on a medicine (fitness) ball causing her to fall. The ball went behind her and she fell forward catching herself on her left wrist and left hip. She did not hear or sense a pop or snap at the time of the injury. The patient notes pain and mild swelling since the injury. Mostly notices the pain and mild weakness with grasping and gripping. She has not injured this site in the past.  She is not currently taking any meds for pain. Using wrist brace with lifting. Past Medical History:   Diagnosis Date    Anxiety 10/25/2012    Fatigue     Headache     HTN (hypertension) 10/25/2012    Insomnia 10/25/2012    Lumbar back pain 10/25/2012    Steroid injections,  Dr. Linda King RA (rheumatoid arthritis) ([facility] [address] 10/25/2012    Skipped beats      Past Surgical History:   Procedure Laterality Date    HX BREAST REDUCTION      HX  SECTION      2    HX HEENT      deviated septum    HX ORTHOPAEDIC      R metatarsal joint fusion    NEUROLOGICAL PROCEDURE UNLISTED      lumbar radio frequency neurotomy     Allergies   Allergen Reactions    Azithromycin Rash    Carafate [Sucralfate] Swelling    Cephalexin Hcl Rash    Enbrel [Etanercept] Other (comments)     Blisters at injection sites    Penicillin G Rash    Protonix [Pantoprazole] Swelling    Tamiflu [Oseltamivir Phosphate] Swelling          Objective:     Visit Vitals  BP (!) 136/96 (BP 1 Location: Left arm, BP Patient Position: Sitting)   Pulse 77   Temp 98.3 °F (36.8 °C) (Oral)   Resp 16   Ht 5' 6\" (1.676 m)   Wt 140 lb (63.5 kg)   SpO2 98%   BMI 22.60 kg/m²      Appearance: alert, well appearing, and in no distress.    Musculoskeletal exam: abnormal exam of left wrist and hand: contusion and mild swelling over thenar eminence of left hand and medial wrist.  Tenderness is also noted. Normal ROM but with pain. Imaging  EXAM: XR WRIST LT AP/LAT/OBL MIN 3V, XR HAND LT MIN 3 V     INDICATION:  Wrist and hand pain.     COMPARISON: None.     FINDINGS: 3 views of the left wrist. 3 views of the left hand. No acute fracture  or dislocation. There is mild osteoarthritis in the STT joint. There is mild  osteoarthritis in the second MCP joint. The soft tissues appear unremarkable.     IMPRESSION  IMPRESSION: No acute abnormality.          Assessment/Plan:       ICD-10-CM ICD-9-CM    1. Contusion of left hand, initial encounter S60.222A 923.20    2. Left wrist pain M25.532 719.43 XR WRIST LT AP/LAT/OBL MIN 3V   3. Pain of left thumb M79.645 729.5 XR HAND LT MIN 3 V   4. Fall, initial encounter [address] XXXA E888.9 XR HAND LT MIN 3 V      XR WRIST LT AP/LAT/OBL MIN 3V   5. Osteoarthritis of left thumb M18.12 715.34      Orders Placed This Encounter    XR HAND LT MIN 3 V    XR WRIST LT AP/LAT/OBL    predniSONE (DELTASONE) 20 mg tablet     The patient is advised to rest, apply cold or ice intermittently and elevate affected extremity. Prednisone as directed then tylenol prn.  F/u if no improvement and prn. Carlitos Smith NP  This note will not be viewable in [address]

## 2020-08-24 ENCOUNTER — TELEPHONE (OUTPATIENT)
Dept: NEUROLOGY | Age: 60
End: 2020-08-24

## 2020-08-24 NOTE — TELEPHONE ENCOUNTER
Prior Authorization submitted for Aimovig to [facility] via Cover My Meds. Status Pending. Allow 24-72 hours for response.     [facility]: [id number]    Case #: [id number]

## 2020-08-25 NOTE — TELEPHONE ENCOUNTER
Prior Authorization APPROVED for Aimovig 140mg by [employer]. Effective dates 08/24/20 - 08/24/21. Case #[id number]    Approval will be scanned into media.  Pharmacy notified of approval.

## 2021-01-25 RX ORDER — ERENUMAB-AOOE 140 MG/ML
INJECTION, SOLUTION SUBCUTANEOUS
Qty: 1 ML | Refills: 5 | Status: SHIPPED | OUTPATIENT
Start: 2021-01-25 | End: 2021-09-21 | Stop reason: SDUPTHER

## 2021-04-10 DIAGNOSIS — G43.011 INTRACTABLE MIGRAINE WITHOUT AURA AND WITH STATUS MIGRAINOSUS: ICD-10-CM

## 2021-04-10 RX ORDER — ELETRIPTAN HYDROBROMIDE 40 MG/1
TABLET, FILM COATED ORAL
Qty: 6 TAB | Refills: 9 | Status: SHIPPED | OUTPATIENT
Start: 2021-04-10 | End: 2021-10-05 | Stop reason: SDUPTHER

## 2021-04-26 ENCOUNTER — TRANSCRIBE ORDER (OUTPATIENT)
Dept: SCHEDULING | Age: 61
End: 2021-04-26

## 2021-04-26 DIAGNOSIS — M47.816 LUMBAR SPONDYLOSIS: Primary | ICD-10-CM

## 2021-05-22 ENCOUNTER — HOSPITAL ENCOUNTER (OUTPATIENT)
Dept: MRI IMAGING | Age: 61
Discharge: HOME | End: 2021-05-22
Attending: SPECIALIST
Payer: COMMERCIAL

## 2021-05-22 DIAGNOSIS — M47.816 LUMBAR SPONDYLOSIS: ICD-10-CM

## 2021-05-22 PROCEDURE — 72148 MRI LUMBAR SPINE W/O DYE: CPT

## 2021-09-21 ENCOUNTER — TELEPHONE (OUTPATIENT)
Dept: NEUROLOGY | Age: 61
End: 2021-09-21

## 2021-09-21 DIAGNOSIS — G43.011 INTRACTABLE MIGRAINE WITHOUT AURA AND WITH STATUS MIGRAINOSUS: Primary | ICD-10-CM

## 2021-09-21 RX ORDER — ERENUMAB-AOOE 140 MG/ML
INJECTION, SOLUTION SUBCUTANEOUS
Qty: 1 ML | Refills: 0 | Status: SHIPPED | OUTPATIENT
Start: 2021-09-21 | End: 2021-10-05 | Stop reason: SDUPTHER

## 2021-09-21 NOTE — TELEPHONE ENCOUNTER
----- Message from Pauline Bailey sent at 9/20/2021  4:37 PM EDT -----  Regarding: Dr. Ruby Enriquez / Shaka Bruner (if not patient): self      Relationship of caller (if not patient): na      Best contact number(s): [phone]      Name of medication and dosage if known: Aimovig Autoinjector 140 mg/mL injection       Is patient out of this medication (yes/no): yes      Pharmacy name: [facility] listed in chart? (yes/no): yes  Pharmacy phone number: [phone]      Details to clarify the request: pt scheduled an appt for 12/01/2021 but will need a refill on the Aimovig Autoinjector 140 mg/mL injection.       Pauline Bailey

## 2021-10-05 ENCOUNTER — TELEPHONE (OUTPATIENT)
Dept: NEUROLOGY | Age: 61
End: 2021-10-05

## 2021-10-05 ENCOUNTER — OFFICE VISIT (OUTPATIENT)
Dept: NEUROLOGY | Age: 61
End: 2021-10-05
Payer: COMMERCIAL

## 2021-10-05 VITALS
HEART RATE: 69 BPM | RESPIRATION RATE: 16 BRPM | TEMPERATURE: 97.8 F | OXYGEN SATURATION: 98 % | DIASTOLIC BLOOD PRESSURE: 70 MMHG | SYSTOLIC BLOOD PRESSURE: 112 MMHG | BODY MASS INDEX: 22.92 KG/M2 | WEIGHT: 142 LBS

## 2021-10-05 DIAGNOSIS — G43.011 INTRACTABLE MIGRAINE WITHOUT AURA AND WITH STATUS MIGRAINOSUS: ICD-10-CM

## 2021-10-05 PROCEDURE — 99213 OFFICE O/P EST LOW 20 MIN: CPT | Performed by: PSYCHIATRY & NEUROLOGY

## 2021-10-05 RX ORDER — SUMATRIPTAN 3 MG/1
INJECTION, SOLUTION SUBCUTANEOUS
Qty: 4 ML | Refills: 5 | Status: SHIPPED | OUTPATIENT
Start: 2021-10-05 | End: 2022-04-06 | Stop reason: SDUPTHER

## 2021-10-05 RX ORDER — ELETRIPTAN HYDROBROMIDE 40 MG/1
TABLET, FILM COATED ORAL
Qty: 6 TABLET | Refills: 9 | Status: SHIPPED | OUTPATIENT
Start: 2021-10-05 | End: 2022-04-06 | Stop reason: SDUPTHER

## 2021-10-05 RX ORDER — ESTRADIOL 10 UG/1
INSERT VAGINAL
COMMUNITY
End: 2021-10-05

## 2021-10-05 RX ORDER — ERENUMAB-AOOE 140 MG/ML
INJECTION, SOLUTION SUBCUTANEOUS
Qty: 1 ML | Refills: 5 | Status: SHIPPED | OUTPATIENT
Start: 2021-10-05 | End: 2022-02-21

## 2021-10-05 RX ORDER — SUMATRIPTAN 3 MG/1
INJECTION, SOLUTION SUBCUTANEOUS
Qty: 0.5 ML | Refills: 0 | Status: SHIPPED | COMMUNITY
Start: 2021-10-05 | End: 2022-02-21

## 2021-10-05 RX ORDER — INFLIXIMAB-AXXQ 100 MG/10ML
INJECTION, POWDER, LYOPHILIZED, FOR SOLUTION INTRAVENOUS
COMMUNITY
End: 2021-10-05

## 2021-10-05 NOTE — PROGRESS NOTES
[facility] and [address] at [facility] at [address] [place] [address], [address] [facility] [address] [facility], [address] [place] [address]   [phone]              Chief Complaint   Patient presents with    Migraine     still frequent. Pancho Cook has helped with severity     Current Outpatient Medications   Medication Sig Dispense Refill    erenumab-aooe (Aimovig Autoinjector) 140 mg/mL injection INJECT ONE ML SUBCUTANEOUSLY EVERY 30 DAYS 1 mL 0    eletriptan (RELPAX) 40 mg tablet TAKE ONE TABLET BY MOUTH AT ONSET OF HEADACHE; MAY REPEAT ONE TABLET IN 2 HOURS IF NEEDED. FOR 30 DAYS - MAX OF 2 TABLETS PER DAY. 6 Tab 9    SUMAtriptan succinate (ZEMBRACE SYMTOUCH) 3 mg/0.5 mL pnij 1 at HA onset and repeat in 1 hour x 1 prn  Max 4 in 24 hours 8 Syringe 6    hydroxychloroquine (PLAQUENIL) 200 mg tablet Take 200 mg by mouth daily.  albuterol (PROVENTIL VENTOLIN) 2.5 mg /3 mL (0.083 %) nebu       HYDROcodone-acetaminophen (NORCO) 5-325 mg per tablet Take 1 Tab by mouth every four (4) hours as needed.  azelastine-fluticasone (DYMISTA) 137-50 mcg/spray spry by Nasal route.  losartan (COZAAR) 50 mg tablet TAKE ONE TABLET BY MOUTH DAILY (Patient taking differently: Take 50 mg by mouth daily.) 90 Tab 0    VENTOLIN HFA 90 mcg/actuation inhaler INHALE TWO PUFFS BY MOUTH EVERY 4 HOURS AS NEEDED 1 Inhaler 4    fluticasone (FLOVENT HFA) 110 mcg/actuation inhaler Take 2 Puffs by inhalation two (2) times a day. 1 Inhaler 5    ascorbic acid (VITAMIN C) 500 mg tablet Take 1,000 mg by mouth daily.  cholecalciferol, vitamin D3, (VITAMIN D3) 2,000 unit Tab Take 2 tablets by mouth daily.         Allergies   Allergen Reactions    Azithromycin Rash    Carafate [Sucralfate] Swelling    Cephalexin Hcl Rash    Enbrel [Etanercept] Other (comments)     Blisters at injection sites    Penicillin G Rash    Protonix [Pantoprazole] Swelling    Tamiflu [Oseltamivir Phosphate] Swelling     Social History     Tobacco Use    Smoking status: Never Smoker    Smokeless tobacco: Never Used   Substance Use Topics    Alcohol use: Yes     Comment: socially    Drug use: No     57-year-old lady returns today for follow-up intractable migraine headaches. Last visit with me was in March and at that time she was following up for EMG. In terms of her migraine she was started on Aimovig 140 mg. She uses the [facility] for abortive therapy and she also has some Relpax. She notes that going back to the Aimovig from the [address] [place] [address] has made a big difference. Migraines still occur but they are not out of control like they were on Ajovy. Happy with the response    Examination  Visit Vitals  /70 (BP 1 Location: Left upper arm, BP Patient Position: Sitting, BP Cuff Size: Adult)   Pulse 69   Temp 97.8 °F (36.6 °C)   Resp 16   Wt 64.4 kg (142 lb)   SpO2 98%   BMI 22.92 kg/m²     Pleasant lady. She is awake alert oriented and conversant. Normal speech and language. Normal cognition. No ataxia    Impression/Plan  Intractable migraines better on Aimovig having failed Ajovy  Continue Aimovig 140 mg  Continue Zembrace injections as needed  Continue Relpax    As long as she continues in the tolerable state in terms of migraines follow-up in 6 months, sooner should circumstances dictate    Lamont Talbot MD        This note was created using voice recognition software. Despite editing, there may be syntax errors.

## 2021-10-06 NOTE — TELEPHONE ENCOUNTER
RE [address] [place] [address] Drive FAX EFFECTIVE 10-6-21 TO 10-6-22 [facility] [phone]

## 2021-10-08 ENCOUNTER — TELEPHONE (OUTPATIENT)
Dept: NEUROLOGY | Age: 61
End: 2021-10-08

## 2021-10-08 NOTE — TELEPHONE ENCOUNTER
----- Message from Libertad Fisher sent at 10/8/2021 12:16 PM EDT -----  Regarding: Dr. Salina Crawford Message/Vendor Calls    Caller's first and last name:patient       Reason for call:medication error       Callback required yes/no and why:yes       Best contact number(s):[phone]      Details to clarify the request:patient said that her aimovig was sent to the wrong pharmacy  she needs it to be sent to the pharmacy on her chart       Libertad Fisher

## 2021-10-08 NOTE — TELEPHONE ENCOUNTER
S/w pt, this was sent to [facility] on [facility] which is the pharmacy that was verified during [address] She states [facility] rec'd call from [address] [facility] stating they rec'd rx for her. She will call Ines.

## 2022-01-27 ENCOUNTER — OFFICE VISIT (OUTPATIENT)
Dept: PRIMARY CARE CLINIC | Age: 62
End: 2022-01-27

## 2022-01-27 DIAGNOSIS — B34.9 VIRAL ILLNESS: Primary | ICD-10-CM

## 2022-01-27 DIAGNOSIS — J02.9 SORE THROAT: ICD-10-CM

## 2022-01-27 LAB
S PYO AG THROAT QL: NEGATIVE
VALID INTERNAL CONTROL?: YES

## 2022-01-27 PROCEDURE — 99441 PR PHYS/QHP TELEPHONE EVALUATION 5-10 MIN: CPT | Performed by: NURSE PRACTITIONER

## 2022-01-27 PROCEDURE — 87880 STREP A ASSAY W/OPTIC: CPT | Performed by: NURSE PRACTITIONER

## 2022-01-27 NOTE — PROGRESS NOTES
Del Askew is a 57 y.o. female evaluated via telephone on 2022. Consent:  She and/or health care decision maker is aware that that she may receive a bill for this telephone service, depending on her insurance coverage, and has provided verbal consent to proceed: Yes      Documentation:  HPI  The patient arrived at clinic with viral symptoms which included:sinus pressure, sore throat, fatigue, neck aches, headache, congestion      X  3  days. 2022. Patient was seen by her doctor last evening. Was diagnosed with a sinus infection and given doxycycline antibiotic. It was recommended by her doctor to have COVID testing as well but was not able to be done at that visit. The patient   was  vaccinated. They report a possible exposure. Works in school system. The patient was tested for COVID 19 test in their vehicle. She was also tested for strep. ROS  All negative except for those mentioned in HPI    [facility]  Past Surgical History:   Procedure Laterality Date    HX BREAST REDUCTION      HX  SECTION      2    HX HEENT      deviated septum    HX ORTHOPAEDIC      R metatarsal joint fusion    NEUROLOGICAL PROCEDURE UNLISTED      lumbar radio frequency neurotomy     Diagnosis and Plan    ICD-10-CM ICD-9-CM    1. Viral illness  B34.9 079.99 AMB POC RAPID STREP A   2. Sore throat  J02.9 462 NOVEL CORONAVIRUS (COVID-19)      AMB POC RAPID STREP A       COVID screening questions scanned into chart. I communicated with the patient and/or health care decision maker about   1-2 day lb turn around time. Negative strep testing.   Results for orders placed or performed in visit on 22   AMB POC RAPID STREP A   Result Value Ref Range    VALID INTERNAL CONTROL POC Yes     Group A Strep Ag Negative Negative     Quarantine  Quarantine if you have been in close contact (within 6 feet of someone for a cumulative total of 15 minutes or more over a 24-hour period) with someone who has COVID-19, unless you have been fully vaccinated. People who are fully vaccinated do NOT need to quarantine after contact with someone who had COVID-19 unless they have symptoms. However, fully vaccinated people should get tested 3-5 days after their exposure, even if they dont have symptoms and wear a mask indoors in public for 14 days following exposure or until their test result is negative. What to do  Stay home for 10 days after your last contact with a person who has COVID-19. Watch for fever (100. 4? F), cough, shortness of breath, or other symptoms of COVID-19. If possible, stay away from people you live with, especially people who are at higher risk for getting very sick from COVID-19. After quarantine  Watch for symptoms until 10 days after exposure. If you have symptoms, immediately self-isolate and contact your local public health authority or healthcare provider. You may be able to shorten your quarantine  Your local public health authorities make the final decisions about how long quarantine should last, based on local conditions and needs. Follow the recommendations of your local public health department if you need to quarantine. Options they will consider include stopping quarantine    After day 10 without testing  After day 7 after receiving a negative test result (test must occur on day 5 or later)    Details of this discussion including any medical advice provided:  recommend quarantine until resulted. I have spent 5-10 minutes obtaining and reviewing COVID screening questions and discussing test results with the patient regarding the diagnosis and importance of compliance with the treatment plan as well as documenting on the day of the visit. I affirm this is a Patient Initiated Episode with an Patient who has not had a related appointment within my department in the past 7 days or scheduled within the next 24 hours.     Total Time: minutes: 5-10 minutes          Zuly Scott, FNP

## 2022-01-29 LAB
SARS-COV-2, NAA 2 DAY TAT: NORMAL
SARS-COV-2, NAA: NOT DETECTED

## 2022-02-21 ENCOUNTER — OFFICE VISIT (OUTPATIENT)
Dept: PRIMARY CARE CLINIC | Age: 62
End: 2022-02-21

## 2022-02-21 VITALS
DIASTOLIC BLOOD PRESSURE: 93 MMHG | TEMPERATURE: 97.3 F | SYSTOLIC BLOOD PRESSURE: 134 MMHG | HEIGHT: 66 IN | OXYGEN SATURATION: 99 % | HEART RATE: 80 BPM | BODY MASS INDEX: 22.66 KG/M2 | RESPIRATION RATE: 14 BRPM | WEIGHT: 141 LBS

## 2022-02-21 DIAGNOSIS — R07.89 CHEST TIGHTNESS: Primary | ICD-10-CM

## 2022-02-21 DIAGNOSIS — J45.30 MILD PERSISTENT ASTHMA, UNSPECIFIED WHETHER COMPLICATED: ICD-10-CM

## 2022-02-21 PROCEDURE — 93000 ELECTROCARDIOGRAM COMPLETE: CPT | Performed by: NURSE PRACTITIONER

## 2022-02-21 PROCEDURE — 99213 OFFICE O/P EST LOW 20 MIN: CPT | Performed by: NURSE PRACTITIONER

## 2022-02-21 RX ORDER — PREDNISOLONE ACETATE 10 MG/ML
SUSPENSION/ DROPS OPHTHALMIC
COMMUNITY
Start: 2022-02-16 | End: 2022-04-06 | Stop reason: ALTCHOICE

## 2022-02-21 RX ORDER — GABAPENTIN 300 MG/1
CAPSULE ORAL
COMMUNITY
Start: 2021-12-07 | End: 2022-04-06 | Stop reason: ALTCHOICE

## 2022-02-21 RX ORDER — INFLIXIMAB-AXXQ 100 MG/10ML
INJECTION, POWDER, LYOPHILIZED, FOR SOLUTION INTRAVENOUS
COMMUNITY

## 2022-02-21 RX ORDER — METHYLPREDNISOLONE 4 MG/1
TABLET ORAL
Qty: 1 DOSE PACK | Refills: 0 | Status: SHIPPED | OUTPATIENT
Start: 2022-02-21 | End: 2022-04-06

## 2022-02-21 NOTE — PATIENT INSTRUCTIONS
Learning About Coronavirus [phone])  What is coronavirus (COVID-19)? COVID-19 is a disease caused by a type of coronavirus. This illness was first found in December 2019. It has since spread worldwide. Coronaviruses are a large group of viruses. They cause the common cold. They also cause more serious illnesses like Middle East respiratory syndrome (MERS) and severe acute respiratory syndrome (SARS). COVID-19 is caused by a novel coronavirus. That means it's a new type that has not been seen in people before. What are the symptoms? COVID-19 symptoms may include:  · Fever. · Cough. · Trouble breathing. · Chills or repeated shaking with chills. · Muscle and body aches. · Headache. · Sore throat. · New loss of taste or smell. · Vomiting. · Diarrhea. In severe cases, COVID-19 can cause pneumonia and make it hard to breathe without help from a machine. It can cause death. How is it diagnosed? COVID-19 is diagnosed with a viral test. This may also be called a PCR test or antigen test. It looks for evidence of the virus in your breathing passages or lungs (respiratory system). The test is most often done on a sample from the nose, throat, or lungs. It's sometimes done on a sample of saliva. One way a sample is collected is by putting a long swab into the back of your nose. How is it treated? Mild cases of COVID-19 can be treated at home. Serious cases need treatment in the hospital. Treatment may include medicines to reduce symptoms, plus breathing support such as oxygen therapy or a ventilator. Some people may be placed on their belly to help their oxygen levels. Treatments that may help people who have COVID-19 include:  Antiviral medicines. These medicines treat viral infections. Remdesivir is an example. Immune-based therapy. These medicines help the immune system fight COVID-19. Examples include monoclonal antibodies. Blood thinners. These medicines help prevent blood clots. People with severe illness are at risk for blood clots. How can you protect yourself and others? The best way to protect yourself from getting sick is to:  · Get vaccinated. · Avoid sick people. · If you are not fully vaccinated:  ? Wear a mask if you have to go to public areas. ? Avoid crowds and try to stay at least 6 feet away from other people. · Cover your mouth with a tissue when you cough or sneeze. · Wash your hands often, especially after you cough or sneeze. Use soap and water, and scrub for at least 20 seconds. If soap and water aren't available, use an alcohol-based hand . · Avoid touching your mouth, nose, and eyes. To help avoid spreading the virus to others:  · Get vaccinated. · Cover your mouth with a tissue when you cough or sneeze. · Wash your hands often, especially after you cough or sneeze. Use soap and water, and scrub for at least 20 seconds. If soap and water aren't available, use an alcohol-based hand . · If you have been exposed to the virus and are not fully vaccinated:  ? Stay home. Don't go to school, work, or public areas. And don't use public transportation, ride-shares, or taxis unless you have no choice. ? Wear a mask if you have to go to public areas, like the pharmacy. · If you're sick:  ? Leave your home only if you need to get medical care. But call the doctor's office first so they know you're coming. And wear a mask. ? Wear a mask whenever you're around other people. ? Limit contact with pets and people in your home. If possible, stay in a separate bedroom and use a separate bathroom. ? Clean and disinfect your home every day. Use household  and disinfectant wipes or sprays. Take special care to clean things that you touch with your hands. How can you self-isolate when you have COVID-19? If you have COVID-19, there are things you can do to help avoid spreading the virus to others. · Limit contact with people in your home.  If possible, stay in a separate bedroom and use a separate bathroom. · Wear a mask when you are around other people. · If you have to leave home, avoid crowds and try to stay at least 6 feet away from other people. · Avoid contact with pets and other animals. · Cover your mouth and nose with a tissue when you cough or sneeze. Then throw it in the trash right away. · Wash your hands often, especially after you cough or sneeze. Use soap and water, and scrub for at least 20 seconds. If soap and water aren't available, use an alcohol-based hand . · Don't share personal household items. These include bedding, towels, cups and glasses, and eating utensils. · [address] in the warmest water allowed for the fabric type, and dry it completely. It's okay to wash other people's laundry with yours. · Clean and disinfect your home. Use household  and disinfectant wipes or sprays. When should you call for help? Call 911 anytime you think you may need emergency care. For example, call if you have life-threatening symptoms, such as:    · You have severe trouble breathing. (You can't talk at all.)     · You have constant chest pain or pressure.     · You are severely dizzy or lightheaded.     · You are confused or can't think clearly.     · You have pale, gray, or blue-colored skin or lips.     · You pass out (lose consciousness) or are very hard to wake up. Call your doctor now or seek immediate medical care if:    · You have moderate trouble breathing. (You can't speak a full sentence.)     · You are coughing up blood (more than about 1 teaspoon).     · You have signs of low blood pressure. These include feeling lightheaded; being too weak to stand; and having cold, pale, clammy skin.    Watch closely for changes in your health, and be sure to contact your doctor if:    · Your symptoms get worse.     · You are not getting better as expected.     · You have new or worse symptoms of anxiety, depression, nightmares, or flashbacks. Call before you go to the doctor's office. Follow their instructions. And wear a mask. Current as of: July 1, 2021               Content Version: 13.0  © 2006-2021 [employer]. Care instructions adapted under license by [employer] (which disclaims liability or warranty for this information). If you have questions about a medical condition or this instruction, always ask your healthcare professional. [place] [address] any warranty or liability for your use of this information.

## 2022-02-21 NOTE — PROGRESS NOTES
Subjective:   Manjit Garcia presents for evaluation of Shortness of Breath     This started 9 days ago, and is unchanged since that time. She also reports chest tightness/heaviness in her chest.  C/o SOB at rest and SHORT. Diagnosed with Covid-19 on 2/12 but symptom onset 2/9 or 2/10. No fevers in the last 7 days. Denies any cough or wheezing. Pt denies hospitalization and recovered at home. She has not sought treatment for the above with PCP or elsewhere. Treatments have included: albuterol nebs and inhaler. Taking Flovent inhaler. Relevant PMH: Asthma, recurrent sinusitis and RA. Of note, she had cataract surgery 2/9.      BP (!) 134/93 (BP 1 Location: Right upper arm, BP Patient Position: Sitting, BP Cuff Size: Adult)   Pulse 80   Temp 97.3 °F (36.3 °C) (Temporal)   Resp 14   Ht 5' 6\" (1.676 m)   Wt 141 lb (64 kg)   SpO2 99%   BMI 22.76 kg/m²     Physical Exam  General: alert, cooperative, no distress  Eye exam: positive findings: conjunctivae: 1+ injection  Ear exam: normal TM's and external ear canals AU  Sinus exam: Normal paranasal sinuses without tenderness  Oropharynx exam: Lips, mucosa, and tongue normal. Teeth and gums normal and normal findings: oropharynx pink & moist without lesions or evidence of thrush  Neck exam: supple, symmetrical, trachea midline and no adenopathy  Heart exam: normal rate, regular rhythm, normal S1, S2, no murmurs, rubs, clicks or gallops  Lung exam: clear to auscultation bilaterally, no acute distress, respirations are even and unlabored, speaking in full and complete sentences. Oxygen saturation 98-99%    12 lead EKG - SR, some artifact but nurse cannot get better tracing    Assessment/Plan:   1. Chest tightness  -     XR CHEST PA LAT;  Future  -     AMB POC EKG ROUTINE W/ 12 LEADS, INTER & REP  2. Mild persistent asthma, unspecified whether complicated    Orders Placed This Encounter    XR CHEST PA LAT    AMB POC EKG ROUTINE W/ 12 LEADS, INTER & REP    gabapentin (NEURONTIN) 300 mg capsule    inFLIXimab-axxq (Avsola) 100 mg injection    prednisoLONE acetate (PRED FORTE) 1 % ophthalmic suspension    methylPREDNISolone (MEDROL DOSEPACK) 4 mg tablet     Continue use of inhaler and nebs prn. The above diagnosis is a new problem. We discussed expected course, resolution, and complications of diagnosis in detail. No follow-ups on file. An electronic signature was used to authenticate this note.   -- Franck Guadalupe NP

## 2022-03-18 PROBLEM — D61.818 PANCYTOPENIA (HCC): Status: ACTIVE | Noted: 2017-09-16

## 2022-03-19 PROBLEM — N39.0 URINARY TRACT INFECTION WITHOUT HEMATURIA: Status: ACTIVE | Noted: 2017-09-16

## 2022-03-19 PROBLEM — R51.9 OCCIPITAL PAIN: Status: ACTIVE | Noted: 2018-04-04

## 2022-03-19 PROBLEM — M06.9 ATROPHIC ARTHRITIS (HCC): Status: ACTIVE | Noted: 2017-09-16

## 2022-04-06 ENCOUNTER — TELEPHONE (OUTPATIENT)
Dept: NEUROLOGY | Age: 62
End: 2022-04-06

## 2022-04-06 ENCOUNTER — OFFICE VISIT (OUTPATIENT)
Dept: NEUROLOGY | Age: 62
End: 2022-04-06
Payer: COMMERCIAL

## 2022-04-06 VITALS
DIASTOLIC BLOOD PRESSURE: 86 MMHG | HEART RATE: 75 BPM | TEMPERATURE: 98.2 F | BODY MASS INDEX: 22.84 KG/M2 | OXYGEN SATURATION: 100 % | RESPIRATION RATE: 16 BRPM | WEIGHT: 141.5 LBS | SYSTOLIC BLOOD PRESSURE: 140 MMHG

## 2022-04-06 DIAGNOSIS — G43.011 INTRACTABLE MIGRAINE WITHOUT AURA AND WITH STATUS MIGRAINOSUS: ICD-10-CM

## 2022-04-06 PROCEDURE — 99214 OFFICE O/P EST MOD 30 MIN: CPT | Performed by: PSYCHIATRY & NEUROLOGY

## 2022-04-06 RX ORDER — ELETRIPTAN HYDROBROMIDE 40 MG/1
TABLET, FILM COATED ORAL
Qty: 6 TABLET | Refills: 11 | Status: SHIPPED | OUTPATIENT
Start: 2022-04-06 | End: 2022-05-08

## 2022-04-06 RX ORDER — ERENUMAB-AOOE 140 MG/ML
INJECTION, SOLUTION SUBCUTANEOUS
Qty: 1 ML | Refills: 11 | Status: SHIPPED | OUTPATIENT
Start: 2022-04-06 | End: 2022-10-16

## 2022-04-06 RX ORDER — SUMATRIPTAN 3 MG/1
INJECTION, SOLUTION SUBCUTANEOUS
Qty: 4 ML | Refills: 11 | Status: SHIPPED | OUTPATIENT
Start: 2022-04-06

## 2022-04-06 NOTE — TELEPHONE ENCOUNTER
Needs PA for Aimovig (tried Ajovy and multiple preventatives w/o success) and Zymbrace tried multiple triptans) .

## 2022-04-06 NOTE — PROGRESS NOTES
[facility] and [address] at [facility] Neurology [facility]ics at [address] [facility], [address] [facility] [address] [facility], [address] [place] [address]   [phone]              Chief Complaint   Patient presents with    Migraine     not doing so well, mix of sinus and migraine issues, almost daily. having CT of sinuses tmr but ENT feels this is migrainous pain going on.  has not had Aimovig in months d/t insurance denial and PA was not done as the then-PA coordinator left office and no msg was taken since to initiate one. Current Outpatient Medications   Medication Sig Dispense Refill    inFLIXimab-axxq (Avsola) 100 mg injection Avsola 100 mg intravenous solution   Inject every 2 months by intravenous route.  eletriptan (RELPAX) 40 mg tablet TAKE ONE TABLET BY MOUTH AT ONSET OF HEADACHE; MAY REPEAT ONE TABLET IN 2 HOURS IF NEEDED. FOR 30 DAYS - MAX OF 2 TABLETS PER DAY. 6 Tablet 9    SUMAtriptan succinate (Zembrace Symtouch) 3 mg/0.5 mL pnij 1 at HA onset and repeat in 1 hour x 1 prn  Max 4 in 24 hours 4 mL 5    hydroxychloroquine (PLAQUENIL) 200 mg tablet Take 200 mg by mouth daily.  albuterol (PROVENTIL VENTOLIN) 2.5 mg /3 mL (0.083 %) nebu       HYDROcodone-acetaminophen (NORCO) 5-325 mg per tablet Take 1 Tab by mouth every four (4) hours as needed.  azelastine-fluticasone (DYMISTA) 137-50 mcg/spray spry by Nasal route.  losartan (COZAAR) 50 mg tablet TAKE ONE TABLET BY MOUTH DAILY (Patient taking differently: Take 50 mg by mouth daily.) 90 Tab 0    VENTOLIN HFA 90 mcg/actuation inhaler INHALE TWO PUFFS BY MOUTH EVERY 4 HOURS AS NEEDED 1 Inhaler 4    fluticasone (FLOVENT HFA) 110 mcg/actuation inhaler Take 2 Puffs by inhalation two (2) times a day. 1 Inhaler 5    ascorbic acid (VITAMIN C) 500 mg tablet Take 1,000 mg by mouth daily.       cholecalciferol, vitamin D3, (VITAMIN D3) 2,000 unit Tab Take 2 tablets by mouth daily. Allergies   Allergen Reactions    Azithromycin Rash    Carafate [Sucralfate] Swelling    Cephalexin Hcl Rash    Enbrel [Etanercept] Other (comments)     Blisters at injection sites    Penicillin G Rash    Protonix [Pantoprazole] Swelling    Tamiflu [Oseltamivir Phosphate] Swelling     Social History     Tobacco Use    Smoking status: Never Smoker    Smokeless tobacco: Never Used   Substance Use Topics    Alcohol use: Yes     Comment: socially    Drug use: No     57-year-old lady returns today for follow-up. I last saw her in October of last year. She has intractable migraine and she has been maintained on Aimovig. She failed Ajovy. We continued Aimovig and we are using Zembrace and Relpax. Today she reports significant worsening of her headaches. Having daily headaches. She went to ENT thinking they may have been sinus and has a CT of the sinuses scheduled for tomorrow. ENT also thinks they are more migrainous. She has been without her Zembrace. She has been without her Aimovig. There were insurance issues and she is just miserable today. No focal deficits. She is also had eye surgery and had COVID per    Record review finds office visit with nurse practitioner Flavio dated February 21 of this year where she came in with some shortness of breath and tightness in her chest.  She was diagnosed with Covid on the 12th with symptom onset ninth or 10th. No fever recently. She was using a Flovent inhaler. She was sent for chest x-ray and had an EKG. EKG was normal  Chest film was normal.    Laboratory analysis January 27, 2022  Rapid strep negative  Covid test not detected  Covid test + February 12, 2022      Examination  Visit Vitals  BP (!) 140/86 (BP 1 Location: Left upper arm, BP Patient Position: Sitting, BP Cuff Size: Adult)   Pulse 75   Temp 98.2 °F (36.8 °C)   Resp 16   Wt 64.2 kg (141 lb 8 oz)   SpO2 100%   BMI 22.84 kg/m²     Awake, alert and oriented. No icterus. CN intact 2-12 without nystagmus. No pronation or drift. Resists fully in all 4 extrems. DTR symmetric in all 4 extremities. No ataxia. Steady gait. Impression/Plan  Intractable migraine headaches worse  Reinstate Aimovig--she has failed Ajovy which is her insurance preferred agent and was doing very well on Aimovig  Continue Relpax  Reinstate Zembrace  As long as this gets her back in line to where she was doing well follow in 6 months but if not she will call and we will see her sooner    Nell Serrano MD        This note was created using voice recognition software. Despite editing, there may be syntax errors.

## 2022-04-06 NOTE — PROGRESS NOTES
Chief Complaint   Patient presents with    Migraine     not doing so well, mix of sinus and migraine issues, almost daily. having CT of sinuses tmr but ENT feels this is migrainous pain going on.  has not had Aimovig in months d/t insurance denial and PA was not done as the then-PA coordinator left office and no msg was taken since to initiate one.

## 2022-05-08 DIAGNOSIS — G43.011 INTRACTABLE MIGRAINE WITHOUT AURA AND WITH STATUS MIGRAINOSUS: ICD-10-CM

## 2022-05-08 RX ORDER — ELETRIPTAN HYDROBROMIDE 40 MG/1
TABLET, FILM COATED ORAL
Qty: 6 TABLET | Refills: 11 | Status: SHIPPED | OUTPATIENT
Start: 2022-05-08

## 2022-05-12 NOTE — TELEPHONE ENCOUNTER
Re: [place] from nurse, PA needed. Initiated in [facility], key #[id number]  -PA submitted. Awaiting update. Re: Matt Burnette    Initiated in [facility], key#[id number]  - PA approved: 05/12/22-05/11/25   -Case: [id number]  Awaiting letter via fax. Notified [facility]. Sent pt (mycant) msg.

## 2022-05-13 NOTE — TELEPHONE ENCOUNTER
Re:Aimovig    Spoke w/ [employer] rep and was advised PA denied, due to preferred meds: Ajovy, Emgality. Was able to be resubmit PA verbally w/ rep, including notation that: MD prefers to keep pt on Kira Baston since her migraines have been maintained.  -Case iD# [id number]    43-89TC turn around.     -Awaiting update via fax.

## 2022-05-16 NOTE — TELEPHONE ENCOUNTER
Re:Aimovig    [facility] approval letter.  Scanned to chart.   -Approved 05/13/22-05/13/23

## 2022-06-28 ENCOUNTER — OFFICE VISIT (OUTPATIENT)
Dept: PRIMARY CARE CLINIC | Age: 62
End: 2022-06-28

## 2022-06-28 VITALS
RESPIRATION RATE: 18 BRPM | WEIGHT: 139 LBS | DIASTOLIC BLOOD PRESSURE: 87 MMHG | SYSTOLIC BLOOD PRESSURE: 147 MMHG | BODY MASS INDEX: 22.34 KG/M2 | HEIGHT: 66 IN | OXYGEN SATURATION: 97 % | HEART RATE: 75 BPM | TEMPERATURE: 97.2 F

## 2022-06-28 DIAGNOSIS — N30.00 ACUTE CYSTITIS WITHOUT HEMATURIA: Primary | ICD-10-CM

## 2022-06-28 DIAGNOSIS — R20.0 LEFT FACIAL NUMBNESS: ICD-10-CM

## 2022-06-28 DIAGNOSIS — R35.0 FREQUENCY OF URINATION: ICD-10-CM

## 2022-06-28 LAB
BILIRUB UR QL STRIP: NEGATIVE
GLUCOSE UR-MCNC: NEGATIVE MG/DL
KETONES P FAST UR STRIP-MCNC: NEGATIVE MG/DL
PH UR STRIP: 5.5 [PH] (ref 4.6–8)
PROT UR QL STRIP: NEGATIVE
SP GR UR STRIP: 1.03 (ref 1–1.03)
UA UROBILINOGEN AMB POC: NORMAL (ref 0.2–1)
URINALYSIS CLARITY POC: CLEAR
URINALYSIS COLOR POC: YELLOW
URINE BLOOD POC: NEGATIVE
URINE LEUKOCYTES POC: NEGATIVE
URINE NITRITES POC: NEGATIVE

## 2022-06-28 PROCEDURE — 99213 OFFICE O/P EST LOW 20 MIN: CPT | Performed by: NURSE PRACTITIONER

## 2022-06-28 PROCEDURE — 81002 URINALYSIS NONAUTO W/O SCOPE: CPT | Performed by: NURSE PRACTITIONER

## 2022-06-28 RX ORDER — NITROFURANTOIN MACROCRYSTALS 50 MG/1
CAPSULE ORAL
COMMUNITY
Start: 2022-06-27

## 2022-06-28 RX ORDER — FLUTICASONE PROPIONATE 93 UG/1
SPRAY, METERED NASAL
COMMUNITY
Start: 2022-05-09

## 2022-06-28 RX ORDER — EPINEPHRINE 0.3 MG/.3ML
INJECTION SUBCUTANEOUS
COMMUNITY
Start: 2021-10-13

## 2022-06-28 RX ORDER — ESTRADIOL 10 UG/1
INSERT VAGINAL
COMMUNITY
Start: 2022-05-12

## 2022-06-28 RX ORDER — PHENAZOPYRIDINE HYDROCHLORIDE 200 MG/1
200 TABLET, FILM COATED ORAL
Qty: 9 TABLET | Refills: 0 | Status: SHIPPED | OUTPATIENT
Start: 2022-06-28 | End: 2022-07-01

## 2022-06-28 RX ORDER — CYCLOBENZAPRINE HCL 10 MG
TABLET ORAL
COMMUNITY
Start: 2022-06-13

## 2022-06-28 RX ORDER — CEFDINIR 300 MG/1
300 CAPSULE ORAL 2 TIMES DAILY
Qty: 14 CAPSULE | Refills: 0 | Status: SHIPPED | OUTPATIENT
Start: 2022-06-28 | End: 2022-07-05

## 2022-06-28 NOTE — PROGRESS NOTES
HISTORY OF PRESENT ILLNESS  Sheila Gaitan is a 57 y.o. female. HPI  Chief Complaint   Patient presents with    Urinary Frequency     Pt reports she has a UTI her symptoms are frequency, burning, and back pain. Started . Pt presents with complaints of what she believed to be UTI. Symptoms started 2 days ago and she c/o dysuria, urgency, frequency. Very uncomfortable, suprapubic pressure and low back pain. History of recurrent UTI. Follows with Dr. Ludmila Montemayor, Urology. Takes macrodantin 50mg daily. As her symptoms started on , she took one dose of leftover Bactrim on  and one dose on Monday. Pt reports after second dose yesterday, had swelling to left lip and face with tinging. She became very anxious and had Epipen on hand. Considered calling EMS and giving herself Epi but had workers in her home and was reluctant. Symptoms improved but today she continues to have tingling to left lip and left face (cheek) doesn't feel normal.  History of HTN and migraine h/a.  Per pt, she does not usually have tingling with migraines. Went to eye doctor this morning to f/u from cataract surgery and her eyes remain dilated.     Past Medical History:   Diagnosis Date    Anxiety 10/25/2012    COVID-19 2022    Fatigue     Headache     HTN (hypertension) 10/25/2012    Insomnia 10/25/2012    Lumbar back pain 10/25/2012    Steroid injections,  Dr. Magalie Foley RA (rheumatoid arthritis) ([facility] [address] 10/25/2012    Skipped beats      Past Surgical History:   Procedure Laterality Date    HX BREAST REDUCTION      HX CATARACT REMOVAL Bilateral     HX  SECTION      2    HX HEENT      deviated septum    HX ORTHOPAEDIC      R metatarsal joint fusion    NEUROLOGICAL PROCEDURE UNLISTED      lumbar radio frequency neurotomy     Allergies   Allergen Reactions    Azithromycin Rash and Hives     Other reaction(s): rash/itching  Other reaction(s): rash/itching      Carafate [Sucralfate] Swelling  Cephalexin Hcl Rash    Enbrel [Etanercept] Other (comments)     Blisters at injection sites    Penicillin G Rash    Protonix [Pantoprazole] Swelling    Tamiflu [Oseltamivir Phosphate] Swelling         Review of Systems   Constitutional: Positive for malaise/fatigue. Negative for chills and fever. Respiratory: Negative for cough and shortness of breath. Cardiovascular: Negative for chest pain. Gastrointestinal: Negative for abdominal pain, nausea and vomiting. Genitourinary: Positive for dysuria, frequency and urgency. Neurological: Positive for tingling. Negative for dizziness, weakness and headaches. Physical Exam  Constitutional:       General: She is not in acute distress. Appearance: Normal appearance. She is not ill-appearing, toxic-appearing or diaphoretic. HENT:      Head: Normocephalic and atraumatic. Right Ear: Tympanic membrane normal.      Left Ear: Tympanic membrane normal.      Ears:      Comments: Cerumen (mild) bilaterally     Nose: Nose normal.      Mouth/Throat:      Mouth: Mucous membranes are moist.      Pharynx: Oropharynx is clear. Eyes:      Extraocular Movements: Extraocular movements intact. Conjunctiva/sclera: Conjunctivae normal.      Comments: Pupils round, dilated   Cardiovascular:      Rate and Rhythm: Normal rate and regular rhythm. Heart sounds: Normal heart sounds. No murmur heard. No friction rub. No gallop. Pulmonary:      Effort: Pulmonary effort is normal.      Breath sounds: Normal breath sounds. Abdominal:      General: Abdomen is flat. Bowel sounds are normal.      Palpations: Abdomen is soft. Tenderness: There is no abdominal tenderness. There is no right CVA tenderness or left CVA tenderness. Musculoskeletal:         General: Normal range of motion. Cervical back: Normal range of motion and neck supple. Skin:     General: Skin is warm and dry. Neurological:      General: No focal deficit present. Mental Status: She is alert and oriented to person, place, and time. GCS: GCS eye subscore is 4. GCS verbal subscore is 5. GCS motor subscore is 6. Cranial Nerves: Cranial nerves are intact. No cranial nerve deficit or facial asymmetry. Motor: Motor function is intact. No weakness. Coordination: Coordination is intact. Coordination normal.      Gait: Gait is intact. Gait normal.      Deep Tendon Reflexes: Reflexes are normal and symmetric. Reflexes normal.      Reflex Scores:       Patellar reflexes are 2+ on the right side and 2+ on the left side. ASSESSMENT and PLAN    ICD-10-CM ICD-9-CM    1. Acute cystitis without hematuria  N30.00 595.0 CULTURE, URINE      CULTURE, URINE   2. Frequency of urination  R35.0 788.41 AMB POC URINALYSIS DIP STICK MANUAL W/O MICRO      CULTURE, URINE      CULTURE, URINE   3. Left facial numbness  R20.0 782.0      Orders Placed This Encounter    CULTURE, URINE    AMB POC URINALYSIS DIP STICK MANUAL W/O MICRO    cyclobenzaprine (FLEXERIL) 10 mg tablet    EPINEPHrine (EPIPEN) 0.3 mg/0.3 mL injection    Vagifem 10 mcg tab vaginal tablet    nitrofurantoin (MACRODANTIN) 50 mg capsule    Xhance 93 mcg/actuation aerb    cefdinir (OMNICEF) 300 mg capsule    phenazopyridine (PYRIDIUM) 200 mg tablet     Urine dip is negative but pt also on daily nitrofurantoin and took 2 doses Bactrim. Pt has multiple allergies and on last cx resistant to bactrim, tetracyclines, cipro, levaquin. Chart notes all to keflex with rash. Will try cefdinir and advised pt to monitor for rash and call office if any reaction. Pt shows picture of her face yesterday and I cannot appreciate the swelling. I am concerned about the numbness and recommend she go to ED for further evaluation - Ddx - CVA, TIA, etc.  Pt in agreement and will go to ED. Pt left office ambulatory and in stable condition.     Amber Carroll NP

## 2022-06-28 NOTE — PATIENT INSTRUCTIONS
Urinary Tract Infection (UTI) in Women: Care Instructions  Overview     A urinary tract infection, or UTI, is a general term for an infection anywhere between the kidneys and the urethra (where urine comes out). Most UTIs are bladder infections. They often cause pain or burning when you urinate. UTIs are caused by bacteria and can be cured with antibiotics. Be sure to complete your treatment so that the infection does not get worse. Follow-up care is a key part of your treatment and safety. Be sure to make and go to all appointments, and call your doctor if you are having problems. It's also a good idea to know your test results and keep a list of the medicines you take. How can you care for yourself at home? · Take your antibiotics as directed. Do not stop taking them just because you feel better. You need to take the full course of antibiotics. · Drink extra water and other fluids for the next day or two. This will help make the urine less concentrated and help wash out the bacteria that are causing the infection. (If you have kidney, heart, or liver disease and have to limit fluids, talk with your doctor before you increase the amount of fluids you drink.)  · Avoid drinks that are carbonated or have caffeine. They can irritate the bladder. · Urinate often. Try to empty your bladder each time. · To relieve pain, take a hot bath or lay a heating pad set on low over your lower belly or genital area. Never go to sleep with a heating pad in place. To prevent UTIs  · Drink plenty of water each day. This helps you urinate often, which clears bacteria from your system. (If you have kidney, heart, or liver disease and have to limit fluids, talk with your doctor before you increase the amount of fluids you drink.)  · Urinate when you need to. · If you are sexually active, urinate right after you have sex. · Change sanitary pads often.   · Avoid douches, bubble baths, feminine hygiene sprays, and other feminine hygiene products that have deodorants. · After going to the bathroom, wipe from front to back. When should you call for help? Call your doctor now or seek immediate medical care if:    · Symptoms such as fever, chills, nausea, or vomiting get worse or appear for the first time.     · You have new pain in your back just below your rib cage. This is called flank pain.     · There is new blood or pus in your urine.     · You have any problems with your antibiotic medicine. Watch closely for changes in your health, and be sure to contact your doctor if:    · You are not getting better after taking an antibiotic for 2 days.     · Your symptoms go away but then come back. Where can you learn more? Go to http://www.gray.com/  Enter R960 in the search box to learn more about \"Urinary Tract Infection (UTI) in Women: Care Instructions. \"  Current as of: October 18, 2021               Content Version: 13.2  © 2006-2022 [employer]. Care instructions adapted under license by [employer] (which disclaims liability or warranty for this information). If you have questions about a medical condition or this instruction, always ask your healthcare professional. [place] [address] any warranty or liability for your use of this information.

## 2022-06-28 NOTE — PROGRESS NOTES
Chief Complaint   Patient presents with    Urinary Frequency     Pt reports she has a UTI her symptoms are frequency, burning, and back pain. Started Sunday.       Visit Vitals  BP (!) 147/87   Pulse 75   Temp 97.2 °F (36.2 °C)   Resp 18   Ht 5' 6\" (1.676 m)   Wt 139 lb (63 kg)   SpO2 97%   BMI 22.44 kg/m²

## 2022-07-01 LAB — BACTERIA UR CULT: NO GROWTH

## 2022-10-16 DIAGNOSIS — G43.011 INTRACTABLE MIGRAINE WITHOUT AURA AND WITH STATUS MIGRAINOSUS: ICD-10-CM

## 2022-10-16 RX ORDER — ERENUMAB-AOOE 140 MG/ML
INJECTION, SOLUTION SUBCUTANEOUS
Qty: 1 ML | Refills: 11 | Status: SHIPPED | OUTPATIENT
Start: 2022-10-16

## 2022-12-19 ENCOUNTER — OFFICE VISIT (OUTPATIENT)
Dept: NEUROLOGY | Age: 62
End: 2022-12-19
Payer: COMMERCIAL

## 2022-12-19 VITALS
DIASTOLIC BLOOD PRESSURE: 80 MMHG | RESPIRATION RATE: 20 BRPM | HEART RATE: 85 BPM | SYSTOLIC BLOOD PRESSURE: 124 MMHG | OXYGEN SATURATION: 98 %

## 2022-12-19 DIAGNOSIS — G43.011 INTRACTABLE MIGRAINE WITHOUT AURA AND WITH STATUS MIGRAINOSUS: ICD-10-CM

## 2022-12-19 PROCEDURE — 99213 OFFICE O/P EST LOW 20 MIN: CPT | Performed by: PSYCHIATRY & NEUROLOGY

## 2022-12-19 PROCEDURE — 3074F SYST BP LT 130 MM HG: CPT | Performed by: PSYCHIATRY & NEUROLOGY

## 2022-12-19 PROCEDURE — 3078F DIAST BP <80 MM HG: CPT | Performed by: PSYCHIATRY & NEUROLOGY

## 2022-12-19 RX ORDER — ELETRIPTAN HYDROBROMIDE 40 MG/1
TABLET, FILM COATED ORAL
Qty: 6 TABLET | Refills: 11 | Status: SHIPPED | OUTPATIENT
Start: 2022-12-19

## 2022-12-19 RX ORDER — EZETIMIBE 10 MG/1
TABLET ORAL
COMMUNITY
Start: 2022-12-13

## 2022-12-19 RX ORDER — SUMATRIPTAN 3 MG/1
INJECTION, SOLUTION SUBCUTANEOUS
Qty: 4 ML | Refills: 11 | Status: SHIPPED | OUTPATIENT
Start: 2022-12-19

## 2022-12-19 NOTE — PROGRESS NOTES
[facility] Neurology Clinics and [address] at [facility] Neurology Clinics at [address] [facility] [address], [address] [facility] [address] [facility], [address] [place] [address]   [phone]              Chief Complaint   Patient presents with    Follow-up     Migraines      Current Outpatient Medications   Medication Sig Dispense Refill    ezetimibe (ZETIA) 10 mg tablet       erenumab-aooe (Aimovig Autoinjector) 140 mg/mL injection INJECT ONE MILLILITER UNDER THE SKIN EVERY MONTH 1 mL 11    cyclobenzaprine (FLEXERIL) 10 mg tablet       EPINEPHrine (EPIPEN) 0.3 mg/0.3 mL injection       Vagifem 10 mcg tab vaginal tablet       nitrofurantoin (MACRODANTIN) 50 mg capsule       Xhance 93 mcg/actuation aerb       eletriptan (RELPAX) 40 mg tablet TAKE ONE TABLET BY MOUTH AT ONSET OF HEADACHE; MAY REPEAT ONE TABLET IN 2 HOURS IF NEEDED FOR 30 DAYS * MAX 2 TABLETS PER DAY* 6 Tablet 11    SUMAtriptan succinate (Zembrace Symtouch) 3 mg/0.5 mL pnij 1 at HA onset and repeat in 1 hour x 1 prn  Max 4 in 24 hours 4 mL 11    inFLIXimab-axxq (Avsola) 100 mg injection Avsola 100 mg intravenous solution   Inject every 2 months by intravenous route. hydroxychloroquine (PLAQUENIL) 200 mg tablet Take 200 mg by mouth daily. albuterol (PROVENTIL VENTOLIN) 2.5 mg /3 mL (0.083 %) nebu       HYDROcodone-acetaminophen (NORCO) 5-325 mg per tablet Take 1 Tab by mouth every four (4) hours as needed. losartan (COZAAR) 50 mg tablet TAKE ONE TABLET BY MOUTH DAILY (Patient taking differently: Take 50 mg by mouth daily.) 90 Tab 0    VENTOLIN HFA 90 mcg/actuation inhaler INHALE TWO PUFFS BY MOUTH EVERY 4 HOURS AS NEEDED 1 Inhaler 4    cholecalciferol, vitamin D3, 50 mcg (2,000 unit) tab Take 2 tablets by mouth daily. azelastine-fluticasone (DYMISTA) 137-50 mcg/spray by Nasal route.  (Patient not taking: No sig reported)      fluticasone (FLOVENT HFA) 110 mcg/actuation inhaler Take 2 Puffs by inhalation two (2) times a day. (Patient not taking: No sig reported) 1 Inhaler 5    ascorbic acid, vitamin C, (VITAMIN C) 500 mg tablet Take 1,000 mg by mouth daily. (Patient not taking: No sig reported)        Allergies   Allergen Reactions    Azithromycin Rash and Hives     Other reaction(s): rash/itching  Other reaction(s): rash/itching      Carafate [Sucralfate] Swelling    Cephalexin Hcl Rash    Enbrel [Etanercept] Other (comments)     Blisters at injection sites    Penicillin G Rash    Protonix [Pantoprazole] Swelling    Tamiflu [Oseltamivir Phosphate] Swelling     Social History     Tobacco Use    Smoking status: Never    Smokeless tobacco: Never   Vaping Use    Vaping Use: Never used   Substance Use Topics    Alcohol use: Yes     Comment: socially    Drug use: No     65-year-old lady returns today for follow-up intractable migraine headaches. Her last visit with me was in April and at that time we continued her on Relpax Zembrace and we reinstated Brenda Roam. Today she reports she has at least 9 headache days per month. They are markedly improved and that they are not as intense and she is not going into status migrainosus. Sometimes has to use an injection of Zembrace. She is a bit worried today as she is changing insurance in January and last time we had a difficult time getting the Brenda Roam approved. Examination  Visit Vitals  /80 (BP 1 Location: Left upper arm, BP Patient Position: Sitting, BP Cuff Size: Adult)   Pulse 85   Resp 20   SpO2 98%   She is a very pleasant engaging lady who is awake alert and oriented with normal speech language and cognition. No ataxia. Impression/Plan  Intractable migraine stable  Continue Aimovig  Continue Relpax  Continue Zembrace  Refills ordered  Samples of Aimovig today to give us a buffer to get a PA if needed  If she does well follow-up in 6 months    Valeri Lucas MD        This note was created using voice recognition software. Despite editing, there may be syntax errors.

## 2022-12-19 NOTE — PROGRESS NOTES
Migraines- have been up and down  Still present   SHe is using her rescues during the month having about 5 to 6 a month   They aren't as bad since she started the aimovig they dont seem to be as severe

## 2023-01-07 ENCOUNTER — APPOINTMENT (OUTPATIENT)
Dept: CT IMAGING | Age: 63
End: 2023-01-07
Attending: NURSE PRACTITIONER
Payer: COMMERCIAL

## 2023-01-07 ENCOUNTER — HOSPITAL ENCOUNTER (EMERGENCY)
Age: 63
Discharge: HOME | End: 2023-01-07
Attending: EMERGENCY MEDICINE
Payer: COMMERCIAL

## 2023-01-07 VITALS
DIASTOLIC BLOOD PRESSURE: 84 MMHG | OXYGEN SATURATION: 97 % | HEART RATE: 127 BPM | HEIGHT: 66 IN | TEMPERATURE: 98.6 F | RESPIRATION RATE: 20 BRPM | WEIGHT: 141 LBS | SYSTOLIC BLOOD PRESSURE: 127 MMHG | BODY MASS INDEX: 22.66 KG/M2

## 2023-01-07 DIAGNOSIS — N39.0 URINARY TRACT INFECTION WITHOUT HEMATURIA, SITE UNSPECIFIED: ICD-10-CM

## 2023-01-07 DIAGNOSIS — R11.2 NAUSEA AND VOMITING, UNSPECIFIED VOMITING TYPE: Primary | ICD-10-CM

## 2023-01-07 DIAGNOSIS — E86.0 DEHYDRATION: ICD-10-CM

## 2023-01-07 LAB
ALBUMIN SERPL-MCNC: 3.5 G/DL (ref 3.5–5)
ALBUMIN/GLOB SERPL: 0.9 {RATIO} (ref 1.1–2.2)
ALP SERPL-CCNC: 48 U/L (ref 45–117)
ALT SERPL-CCNC: 22 U/L (ref 12–78)
ANION GAP SERPL CALC-SCNC: 10 MMOL/L (ref 5–15)
APPEARANCE UR: CLEAR
AST SERPL-CCNC: 20 U/L (ref 15–37)
BACTERIA URNS QL MICRO: ABNORMAL /HPF
BASOPHILS # BLD: 0 K/UL (ref 0–0.1)
BASOPHILS NFR BLD: 0 % (ref 0–1)
BILIRUB SERPL-MCNC: 1.3 MG/DL (ref 0.2–1)
BILIRUB UR QL: NEGATIVE
BUN SERPL-MCNC: 19 MG/DL (ref 6–20)
BUN/CREAT SERPL: 18 (ref 12–20)
CALCIUM SERPL-MCNC: 9.4 MG/DL (ref 8.5–10.1)
CHLORIDE SERPL-SCNC: 98 MMOL/L (ref 97–108)
CO2 SERPL-SCNC: 25 MMOL/L (ref 21–32)
COLOR UR: ABNORMAL
COMMENT, HOLDF: NORMAL
CREAT SERPL-MCNC: 1.05 MG/DL (ref 0.55–1.02)
DIFFERENTIAL METHOD BLD: NORMAL
EOSINOPHIL # BLD: 0 K/UL (ref 0–0.4)
EOSINOPHIL NFR BLD: 0 % (ref 0–7)
EPITH CASTS URNS QL MICRO: ABNORMAL /LPF
ERYTHROCYTE [DISTWIDTH] IN BLOOD BY AUTOMATED COUNT: 12 % (ref 11.5–14.5)
GLOBULIN SER CALC-MCNC: 4 G/DL (ref 2–4)
GLUCOSE SERPL-MCNC: 103 MG/DL (ref 65–100)
GLUCOSE UR STRIP.AUTO-MCNC: NEGATIVE MG/DL
HCT VFR BLD AUTO: 36.1 % (ref 35–47)
HGB BLD-MCNC: 12.6 G/DL (ref 11.5–16)
HGB UR QL STRIP: ABNORMAL
IMM GRANULOCYTES # BLD AUTO: 0 K/UL (ref 0–0.04)
IMM GRANULOCYTES NFR BLD AUTO: 0 % (ref 0–0.5)
KETONES UR QL STRIP.AUTO: 80 MG/DL
LEUKOCYTE ESTERASE UR QL STRIP.AUTO: ABNORMAL
LYMPHOCYTES # BLD: 1.6 K/UL (ref 0.8–3.5)
LYMPHOCYTES NFR BLD: 18 % (ref 12–49)
MCH RBC QN AUTO: 31.9 PG (ref 26–34)
MCHC RBC AUTO-ENTMCNC: 34.9 G/DL (ref 30–36.5)
MCV RBC AUTO: 91.4 FL (ref 80–99)
MONOCYTES # BLD: 0.7 K/UL (ref 0–1)
MONOCYTES NFR BLD: 8 % (ref 5–13)
NEUTS SEG # BLD: 6.3 K/UL (ref 1.8–8)
NEUTS SEG NFR BLD: 74 % (ref 32–75)
NITRITE UR QL STRIP.AUTO: POSITIVE
NRBC # BLD: 0 K/UL (ref 0–0.01)
NRBC BLD-RTO: 0 PER 100 WBC
PH UR STRIP: 5.5 [PH] (ref 5–8)
PLATELET # BLD AUTO: 171 K/UL (ref 150–400)
PMV BLD AUTO: 10.6 FL (ref 8.9–12.9)
POTASSIUM SERPL-SCNC: 3.5 MMOL/L (ref 3.5–5.1)
PROT SERPL-MCNC: 7.5 G/DL (ref 6.4–8.2)
PROT UR STRIP-MCNC: 30 MG/DL
RBC # BLD AUTO: 3.95 M/UL (ref 3.8–5.2)
RBC #/AREA URNS HPF: ABNORMAL /HPF (ref 0–5)
SAMPLES BEING HELD,HOLD: NORMAL
SODIUM SERPL-SCNC: 133 MMOL/L (ref 136–145)
SP GR UR REFRACTOMETRY: 1.02 (ref 1–1.03)
UR CULT HOLD, URHOLD: NORMAL
UROBILINOGEN UR QL STRIP.AUTO: 1 EU/DL (ref 0.2–1)
WBC # BLD AUTO: 8.6 K/UL (ref 3.6–11)
WBC URNS QL MICRO: ABNORMAL /HPF (ref 0–4)

## 2023-01-07 PROCEDURE — 74011250637 HC RX REV CODE- 250/637: Performed by: EMERGENCY MEDICINE

## 2023-01-07 PROCEDURE — 81001 URINALYSIS AUTO W/SCOPE: CPT

## 2023-01-07 PROCEDURE — 74011250636 HC RX REV CODE- 250/636: Performed by: NURSE PRACTITIONER

## 2023-01-07 PROCEDURE — 96365 THER/PROPH/DIAG IV INF INIT: CPT

## 2023-01-07 PROCEDURE — 85025 COMPLETE CBC W/AUTO DIFF WBC: CPT

## 2023-01-07 PROCEDURE — 74177 CT ABD & PELVIS W/CONTRAST: CPT

## 2023-01-07 PROCEDURE — 96366 THER/PROPH/DIAG IV INF ADDON: CPT

## 2023-01-07 PROCEDURE — 96361 HYDRATE IV INFUSION ADD-ON: CPT

## 2023-01-07 PROCEDURE — 96375 TX/PRO/DX INJ NEW DRUG ADDON: CPT

## 2023-01-07 PROCEDURE — 36415 COLL VENOUS BLD VENIPUNCTURE: CPT

## 2023-01-07 PROCEDURE — 74011250636 HC RX REV CODE- 250/636: Performed by: EMERGENCY MEDICINE

## 2023-01-07 PROCEDURE — 80053 COMPREHEN METABOLIC PANEL: CPT

## 2023-01-07 PROCEDURE — 96374 THER/PROPH/DIAG INJ IV PUSH: CPT

## 2023-01-07 PROCEDURE — 74011000636 HC RX REV CODE- 636: Performed by: EMERGENCY MEDICINE

## 2023-01-07 PROCEDURE — 99285 EMERGENCY DEPT VISIT HI MDM: CPT

## 2023-01-07 RX ORDER — POTASSIUM CHLORIDE 750 MG/1
20 TABLET, FILM COATED, EXTENDED RELEASE ORAL
Status: COMPLETED | OUTPATIENT
Start: 2023-01-07 | End: 2023-01-07

## 2023-01-07 RX ORDER — ONDANSETRON 2 MG/ML
4 INJECTION INTRAMUSCULAR; INTRAVENOUS ONCE
Status: COMPLETED | OUTPATIENT
Start: 2023-01-07 | End: 2023-01-07

## 2023-01-07 RX ORDER — ONDANSETRON 4 MG/1
4 TABLET, FILM COATED ORAL
Qty: 20 TABLET | Refills: 0 | Status: SHIPPED | OUTPATIENT
Start: 2023-01-07

## 2023-01-07 RX ORDER — LEVOFLOXACIN 5 MG/ML
750 INJECTION, SOLUTION INTRAVENOUS
Status: COMPLETED | OUTPATIENT
Start: 2023-01-07 | End: 2023-01-07

## 2023-01-07 RX ORDER — ONDANSETRON 4 MG/1
4 TABLET, ORALLY DISINTEGRATING ORAL
Status: COMPLETED | OUTPATIENT
Start: 2023-01-07 | End: 2023-01-07

## 2023-01-07 RX ORDER — NITROFURANTOIN 25; 75 MG/1; MG/1
100 CAPSULE ORAL 2 TIMES DAILY
Qty: 14 CAPSULE | Refills: 0 | Status: SHIPPED | OUTPATIENT
Start: 2023-01-07 | End: 2023-01-14

## 2023-01-07 RX ADMIN — IOPAMIDOL 100 ML: 755 INJECTION, SOLUTION INTRAVENOUS at 19:14

## 2023-01-07 RX ADMIN — POTASSIUM CHLORIDE 20 MEQ: 750 TABLET, FILM COATED, EXTENDED RELEASE ORAL at 20:52

## 2023-01-07 RX ADMIN — SODIUM CHLORIDE 1000 ML: 9 INJECTION, SOLUTION INTRAVENOUS at 17:53

## 2023-01-07 RX ADMIN — ONDANSETRON 4 MG: 2 INJECTION INTRAMUSCULAR; INTRAVENOUS at 17:39

## 2023-01-07 RX ADMIN — SODIUM CHLORIDE 1000 ML: 9 INJECTION, SOLUTION INTRAVENOUS at 20:52

## 2023-01-07 RX ADMIN — ONDANSETRON 4 MG: 4 TABLET, ORALLY DISINTEGRATING ORAL at 22:37

## 2023-01-07 RX ADMIN — LEVOFLOXACIN 750 MG: 5 INJECTION, SOLUTION INTRAVENOUS at 20:52

## 2023-01-07 RX ADMIN — ONDANSETRON 4 MG: 2 INJECTION INTRAMUSCULAR; INTRAVENOUS at 20:52

## 2023-01-07 NOTE — ED TRIAGE NOTES
Pt presents to the ED with c/o abdominal pain and vomiting since Thursday. Pt states that she is unable to keep anything down and that she generally just does not feel good. Pt denies any diarrhea and CP, but does states she has some dyspnea with exertion.

## 2023-01-07 NOTE — ED PROVIDER NOTES
This is a 23-year-old female who presents ambulatory to the emergency room with complaints of abdominal pain and vomiting since Thursday. Patient states on Thursday she started developing increased nausea followed by multiple episodes of vomiting. She also has had multiple episodes of diarrhea but none over the past day or so. States she is unable to keep anything down and she feels generally ill. Adds that she feels as though she may be dehydrated. Denies any chest pain, shortness of breath, dizziness, fevers or chills. States she does have a current urinary tract infection of which she is receiving antibiotics. There are no further complaints at this time    Kristin Thurston MD  Past Medical History:  10/25/2012: Anxiety  2022: COVID-19  No date: Fatigue  No date: Headache  10/25/2012: HTN (hypertension)  10/25/2012:  Insomnia  10/25/2012: Lumbar back pain      Comment:  Steroid injectionsDr. Joanna    10/25/2012: RA (rheumatoid arthritis) ([facility]  No date: Skipped beats  Past Surgical History:  No date: HX BREAST REDUCTION  : HX CATARACT REMOVAL; Bilateral  No date: HX  SECTION      Comment:  2  No date: HX HEENT      Comment:  deviated septum  No date: HX ORTHOPAEDIC      Comment:  R metatarsal joint fusion  No date: NEUROLOGICAL PROCEDURE UNLISTED      Comment:  lumbar radio frequency neurotomy         Past Medical History:   Diagnosis Date    Anxiety 10/25/2012    COVID-19 2022    Fatigue     Headache     HTN (hypertension) 10/25/2012    Insomnia 10/25/2012    Lumbar back pain 10/25/2012    Steroid Dr. Joanna rousseau      RA (rheumatoid arthritis) ([facility] 10/25/2012    Ros aIselaed beats        Past Surgical History:   Procedure Laterality Date    HX BREAST REDUCTION      HX CATARACT REMOVAL Bilateral     HX  SECTION      2    HX HEENT      deviated septum    HX ORTHOPAEDIC      R metatarsal joint fusion    NEUROLOGICAL PROCEDURE UNLISTED      lumbar radio frequency neurotomy Family History:   Problem Relation Age of Onset    Hypertension Mother     Asthma Mother     Arthritis-rheumatoid Mother     Asthma Maternal Aunt     Diabetes Maternal Grandfather     Asthma Other     Other Other         scleroderma    Cancer Other     Migraines Other     Stroke Other        Social History     Socioeconomic History    Marital status:      Spouse name: Not on file    Number of children: Not on file    Years of education: Not on file    Highest education level: Not on file   Occupational History    Not on file   Tobacco Use    Smoking status: Never    Smokeless tobacco: Never   Vaping Use    Vaping Use: Never used   Substance and Sexual Activity    Alcohol use: Yes     Comment: socially    Drug use: No    Sexual activity: Never   Other Topics Concern    Not on file   Social History Narrative    Not on file     Social Determinants of Health     Financial Resource Strain: Not on file   Food Insecurity: Not on file   Transportation Needs: Not on file   Physical Activity: Not on file   Stress: Not on file   Social Connections: Not on file   Intimate Partner Violence: Not on file   Housing Stability: Not on file         ALLERGIES: Azithromycin, Carafate [sucralfate], Cephalexin hcl, Enbrel [etanercept], Penicillin g, Protonix [pantoprazole], and Tamiflu [oseltamivir phosphate]    Review of Systems   Constitutional:  Positive for appetite change. Negative for chills, diaphoresis, fatigue and fever. HENT:  Negative for congestion, sore throat and trouble swallowing. Eyes:  Negative for photophobia and redness. Respiratory:  Negative for chest tightness and shortness of breath. Cardiovascular:  Negative for chest pain and palpitations. Gastrointestinal:  Positive for abdominal pain, nausea and vomiting. Negative for abdominal distention. Endocrine: Negative. Genitourinary:  Negative for difficulty urinating, flank pain, frequency and urgency.    Musculoskeletal:  Negative for back pain, neck pain and neck stiffness. Skin:  Negative for color change, pallor, rash and wound. Allergic/Immunologic: Negative. Neurological:  Negative for dizziness, speech difficulty, weakness and headaches. Hematological:  Does not bruise/bleed easily. Psychiatric/Behavioral:  Negative for behavioral problems. The patient is not nervous/anxious. Vitals:    01/07/23 1641   BP: 127/84   Pulse: (!) 127   Resp: 20   Temp: 98.6 °F (37 °C)   SpO2: 97%   Weight: 64 kg (141 lb)   Height: 5' 6\" (1.676 m)            Physical Exam  Vitals and nursing note reviewed. Constitutional:       General: She is not in acute distress. Appearance: Normal appearance. She is well-developed. She is not ill-appearing. HENT:      Head: Normocephalic and atraumatic. Right Ear: External ear normal.      Left Ear: External ear normal.      Nose: Nose normal. No congestion. Mouth/Throat:      Mouth: Mucous membranes are moist.   Eyes:      General:         Right eye: No discharge. Left eye: No discharge. Conjunctiva/sclera: Conjunctivae normal.      Pupils: Pupils are equal, round, and reactive to light. Neck:      Vascular: No JVD. Trachea: No tracheal deviation. Cardiovascular:      Rate and Rhythm: Regular rhythm. Tachycardia present. Pulses: Normal pulses. Heart sounds: Normal heart sounds. No murmur heard. No gallop. Pulmonary:      Effort: Pulmonary effort is normal. No respiratory distress. Breath sounds: Normal breath sounds. Chest:      Chest wall: No tenderness. Abdominal:      General: Bowel sounds are normal. There is no distension. Palpations: Abdomen is soft. Tenderness: There is abdominal tenderness in the right lower quadrant, suprapubic area and left lower quadrant. There is no guarding or rebound. Genitourinary:     Comments: Known UTI      Musculoskeletal:         General: No tenderness. Normal range of motion.       Cervical back: Normal range of motion and neck supple. Skin:     General: Skin is warm and dry. Capillary Refill: Capillary refill takes less than 2 seconds. Coloration: Skin is not pale. Findings: No erythema or rash. Neurological:      General: No focal deficit present. Mental Status: She is alert and oriented to person, place, and time. Motor: No weakness. Coordination: Coordination normal.   Psychiatric:         Mood and Affect: Mood normal.         Behavior: Behavior normal.         Thought Content: Thought content normal.         Judgment: Judgment normal.        Medical Decision Making  Differential diagnosis includes worsening urinary tract infection, gastroenteritis, appendicitis and others. Amount and/or Complexity of Data Reviewed  Labs: ordered. Radiology: ordered. Risk  Prescription drug management. 6:59 PM  Change of shift. Care of patient signed over to Dr. Lalit Snow. Bedside handoff complete. Awaiting imaging and reassessment after IVF.      Procedures

## 2023-01-08 NOTE — ED PROVIDER NOTES
Patient appears frail and ill, per NP: CT scan pending,     CT negative for acute infection, noted cloudy urine at bedside,    UA shows significant ketones so will give additional fluids, potassium on the lower region low at 3.5 so we will replace orally as giving 2 L of fluid. Will give patient 1 dose of Levaquin in the emergency department secondary to an allergy to Keflex, and then will place on Macrobid as an outpatient.   Patient states she felt better and was discharged

## 2023-01-31 ENCOUNTER — OFFICE VISIT (OUTPATIENT)
Dept: PRIMARY CARE CLINIC | Age: 63
End: 2023-01-31

## 2023-01-31 VITALS
HEIGHT: 66 IN | OXYGEN SATURATION: 97 % | TEMPERATURE: 97.7 F | DIASTOLIC BLOOD PRESSURE: 99 MMHG | SYSTOLIC BLOOD PRESSURE: 135 MMHG | WEIGHT: 141.6 LBS | RESPIRATION RATE: 16 BRPM | HEART RATE: 102 BPM | BODY MASS INDEX: 22.76 KG/M2

## 2023-01-31 DIAGNOSIS — J06.9 VIRAL URI WITH COUGH: Primary | ICD-10-CM

## 2023-01-31 PROCEDURE — 99213 OFFICE O/P EST LOW 20 MIN: CPT

## 2023-01-31 RX ORDER — ALBUTEROL SULFATE 90 UG/1
1 AEROSOL, METERED RESPIRATORY (INHALATION)
Qty: 18 G | Refills: 0 | Status: SHIPPED | OUTPATIENT
Start: 2023-01-31

## 2023-01-31 RX ORDER — PROMETHAZINE HYDROCHLORIDE AND DEXTROMETHORPHAN HYDROBROMIDE 6.25; 15 MG/5ML; MG/5ML
5 SYRUP ORAL
Qty: 120 ML | Refills: 0 | Status: SHIPPED | OUTPATIENT
Start: 2023-01-31 | End: 2023-02-07

## 2023-01-31 RX ORDER — METHYLPREDNISOLONE 4 MG/1
TABLET ORAL
Qty: 1 DOSE PACK | Refills: 0 | Status: SHIPPED | OUTPATIENT
Start: 2023-01-31

## 2023-01-31 NOTE — PROGRESS NOTES
HISTORY OF PRESENT ILLNESS  Noah Duarte is a 60 y.o. female. Chief Complaint   Patient presents with    Sinus Pain     Started Thursday; cough/congestion/green mucus/headache/sinus pressure/ear feel full; no home covid testing   X 6 days of above symptoms feels like its worsening. Patient reports chronic allergies and is being evaluated by ENT (Dr. Eliza Pickett) who has her on allergy shots and recently gave her a compounding intranasal spray. Has been using nasal spray and sudafed which has helped symptoms some. Denies fevers or sick contact. Review of Systems   Constitutional:  Negative for chills and fever. HENT:  Positive for congestion and sinus pain. Negative for ear pain and sore throat. Eyes: Negative. Respiratory:  Positive for cough. Negative for shortness of breath. Cardiovascular: Negative. Gastrointestinal: Negative. Genitourinary: Negative. Musculoskeletal: Negative. Skin: Negative. Neurological:  Positive for headaches. Negative for loss of consciousness. Endo/Heme/Allergies: Negative. Psychiatric/Behavioral: Negative. Physical Exam  Constitutional:       Appearance: Normal appearance. She is normal weight. She is not ill-appearing. HENT:      Head: Normocephalic. Right Ear: A middle ear effusion is present. Left Ear: Tympanic membrane normal.      Nose: Congestion present. Right Sinus: Maxillary sinus tenderness present. No frontal sinus tenderness. Left Sinus: Maxillary sinus tenderness present. No frontal sinus tenderness. Mouth/Throat:      Mouth: Mucous membranes are moist.      Pharynx: Oropharynx is clear. No oropharyngeal exudate or posterior oropharyngeal erythema. Eyes:      Pupils: Pupils are equal, round, and reactive to light. Cardiovascular:      Rate and Rhythm: Normal rate. Pulses: Normal pulses. Heart sounds: Normal heart sounds.    Pulmonary:      Effort: Pulmonary effort is normal.      Breath sounds: Normal breath sounds. Abdominal:      Palpations: Abdomen is soft. Musculoskeletal:         General: Normal range of motion. Cervical back: Normal range of motion and neck supple. Skin:     General: Skin is warm. Neurological:      General: No focal deficit present. Mental Status: She is alert and oriented to person, place, and time. Psychiatric:         Mood and Affect: Mood normal.         Behavior: Behavior normal.     Past Medical History:   Diagnosis Date    Anxiety 10/25/2012    COVID-19 2022    Fatigue     Headache     HTN (hypertension) 10/25/2012    Insomnia 10/25/2012    Lumbar back pain 10/25/2012    Steroid injections,  Dr. Mckayla Merlos      RA (rheumatoid arthritis) ([facility] 10/25/2012    Skipped beats      Past Surgical History:   Procedure Laterality Date    HX BREAST REDUCTION      HX CATARACT REMOVAL Bilateral     HX  SECTION      2    HX HEENT      deviated septum    HX ORTHOPAEDIC      R metatarsal joint fusion    AL UNLISTED NEUROLOGICAL/NEUROMUSCULAR DX PX      lumbar radio frequency neurotomy     BP (!) 135/99 (BP 1 Location: Right arm, BP Patient Position: Sitting)   Pulse (!) 102   Temp 97.7 °F (36.5 °C) (Temporal)   Resp 16   Ht 5' 6\" (1.676 m)   Wt 141 lb 9.6 oz (64.2 kg)   SpO2 97%   BMI 22.85 kg/m²     ASSESSMENT and PLAN  1. Viral URI with cough  -     methylPREDNISolone (MEDROL DOSEPACK) 4 mg tablet; Take as directed., Normal, Disp-1 Dose Pack, R-0  -     promethazine-dextromethorphan (PROMETHAZINE-DM) 6.25-15 mg/5 mL syrup; Take 5 mL by mouth every four (4) hours as needed for Cough for up to 7 days. , Normal, Disp-120 mL, R-0  -     albuterol (PROVENTIL HFA, VENTOLIN HFA, PROAIR HFA) 90 mcg/actuation inhaler; Take 1 Puff by inhalation every four (4) hours as needed for Wheezing., Normal, Disp-18 g, R-0        - Declined flu/covid testing.  Advised to continue home allergy medication and instructed to seek additional care if symptoms worsens/does not improve.    Willow Bedoya NP

## 2023-01-31 NOTE — PATIENT INSTRUCTIONS
- Do not take NSAIDS (motrin, aleve, naproxen etc.) while on steroids. Take steroids in the morning, it will keep you up at night. - Tylenol only for pain/discomfort when taking steroids    - Cough medication only at night, be mindful as it can increase BP take only while in steroids.    - Nasacort as directed long after symptoms subside. Must take it daily for it to work. - Rest/ push fluids    - Humidified air.

## 2023-01-31 NOTE — PROGRESS NOTES
Identified pt with two pt identifiers(name and ). Reviewed record in preparation for visit and have obtained necessary documentation. Chief Complaint   Patient presents with    Sinus Pain     Started Thursday; cough/congestion/green mucus/headache/sinus pressure/ear feel full; no home covid testing      Vitals:    23 1433 23 1440   BP: (!) 140/100 (!) 135/99   Pulse: (!) 102    Resp: 16    Temp: 97.7 °F (36.5 °C)    TempSrc: Temporal    SpO2: 97%    Weight: 141 lb 9.6 oz (64.2 kg)    Height: 5' 6\" (1.676 m)    PainSc:   7    PainLoc: Face        Health Maintenance Review: Patient reminded of \"due or due soon\" health maintenance. I have asked the patient to contact his/her primary care provider (PCP) for follow-up on his/her health maintenance. Coordination of Care Questionnaire:  :   1) Have you been to an emergency room, urgent care, or hospitalized since your last visit? If yes, where when, and reason for visit? no       2. Have seen or consulted any other health care provider since your last visit? If yes, where when, and reason for visit? NO      Patient is accompanied by self I have received verbal consent from Pro Garcia to discuss any/all medical information while they are present in the room.

## 2023-09-25 ENCOUNTER — OFFICE VISIT (OUTPATIENT)
Age: 63
End: 2023-09-25

## 2023-09-25 VITALS
HEIGHT: 66 IN | BODY MASS INDEX: 22.98 KG/M2 | OXYGEN SATURATION: 97 % | HEART RATE: 91 BPM | SYSTOLIC BLOOD PRESSURE: 127 MMHG | WEIGHT: 143 LBS | TEMPERATURE: 98.5 F | RESPIRATION RATE: 18 BRPM | DIASTOLIC BLOOD PRESSURE: 80 MMHG

## 2023-09-25 DIAGNOSIS — J06.9 VIRAL UPPER RESPIRATORY TRACT INFECTION: Primary | ICD-10-CM

## 2023-09-25 RX ORDER — DEXTROMETHORPHAN HYDROBROMIDE AND PROMETHAZINE HYDROCHLORIDE 15; 6.25 MG/5ML; MG/5ML
5 SYRUP ORAL
Qty: 120 ML | Refills: 0 | Status: SHIPPED | OUTPATIENT
Start: 2023-09-25 | End: 2023-10-02

## 2023-09-25 RX ORDER — BENZONATATE 200 MG/1
200 CAPSULE ORAL 3 TIMES DAILY PRN
Qty: 30 CAPSULE | Refills: 0 | Status: SHIPPED | OUTPATIENT
Start: 2023-09-25 | End: 2023-10-05

## 2023-09-25 ASSESSMENT — ENCOUNTER SYMPTOMS
SINUS PRESSURE: 1
COUGH: 1
CHEST TIGHTNESS: 1
SORE THROAT: 1

## 2023-09-25 NOTE — PATIENT INSTRUCTIONS
- Tylenol Extra strength 2 tabs or Ibuprofen 3-4 tabs every 6-8 hours for pain.  - OTC Antihistamines like Zyrtec or Claritin  - Nasacort nasal spray daily  - Sinus Rinse  - Humidifier in room at night  - Vicks Vapor rub

## 2023-09-29 ENCOUNTER — TELEPHONE (OUTPATIENT)
Age: 63
End: 2023-09-29

## 2023-09-29 RX ORDER — ERENUMAB-AOOE 140 MG/ML
INJECTION, SOLUTION SUBCUTANEOUS
Qty: 1 ML | OUTPATIENT
Start: 2023-09-29

## 2023-09-29 NOTE — TELEPHONE ENCOUNTER
Patient needs a refill on her Aimovig injector. Patient has an appointment on Monday 10/02/23.     Please contact

## 2023-10-01 DIAGNOSIS — G43.011 MIGRAINE WITHOUT AURA, INTRACTABLE, WITH STATUS MIGRAINOSUS: Primary | ICD-10-CM

## 2023-10-02 ENCOUNTER — TELEMEDICINE (OUTPATIENT)
Age: 63
End: 2023-10-02
Payer: COMMERCIAL

## 2023-10-02 DIAGNOSIS — G43.011 MIGRAINE WITHOUT AURA, INTRACTABLE, WITH STATUS MIGRAINOSUS: Primary | ICD-10-CM

## 2023-10-02 PROCEDURE — 99214 OFFICE O/P EST MOD 30 MIN: CPT | Performed by: NURSE PRACTITIONER

## 2023-10-02 RX ORDER — SUMATRIPTAN 3 MG/1
INJECTION, SOLUTION SUBCUTANEOUS
Qty: 6 ADJUSTABLE DOSE PRE-FILLED PEN SYRINGE | Refills: 5 | Status: SHIPPED | OUTPATIENT
Start: 2023-10-02

## 2023-10-02 RX ORDER — ERENUMAB-AOOE 140 MG/ML
INJECTION, SOLUTION SUBCUTANEOUS
Qty: 4 ADJUSTABLE DOSE PRE-FILLED PEN SYRINGE | Refills: 2 | Status: SHIPPED | OUTPATIENT
Start: 2023-10-02 | End: 2023-10-02 | Stop reason: SDUPTHER

## 2023-10-02 RX ORDER — ELETRIPTAN HYDROBROMIDE 40 MG/1
40 TABLET, FILM COATED ORAL
COMMUNITY
End: 2023-10-02 | Stop reason: SDUPTHER

## 2023-10-02 RX ORDER — ELETRIPTAN HYDROBROMIDE 40 MG/1
TABLET, FILM COATED ORAL
Qty: 9 TABLET | Refills: 5 | Status: SHIPPED | OUTPATIENT
Start: 2023-10-02

## 2023-10-02 RX ORDER — ERENUMAB-AOOE 140 MG/ML
INJECTION, SOLUTION SUBCUTANEOUS
Qty: 4 ADJUSTABLE DOSE PRE-FILLED PEN SYRINGE | Refills: 2 | Status: SHIPPED | OUTPATIENT
Start: 2023-10-02

## 2023-10-02 NOTE — PROGRESS NOTES
Finger to nose and rapid arm movement testing was normal.   No resting or intention tremor    Gait and Station:  Steady while walking on toes, heels, and with tandem walking. Normal arm swing. No pronator drift. No muscle wasting or fasiculations noted. Reflexes:  Not assessed    Assessment & Plan:      Diagnosis Orders   1. Migraine without aura, intractable, with status migrainosus  Erenumab-aooe (AIMOVIG) 140 MG/ML SOAJ    eletriptan (RELPAX) 40 MG tablet    SUMAtriptan Succinate (Elfida Maze) 3 MG/0.5ML SOAJ        Migraines have been stable on Aimovig for more a few years now. She has had fewer migraines and they are not as intense. They are more effectively aborted now as well. She has been on Ajovy in the past which was not effective. Beta blockers would be contraindicated due to asthma. As she has been stable, will continue Aimovig. Stopping or changing her preventative strategy would make her condition worse. Continue with Relpax for acute management. Instructed to use Zembrace for migraines that wake her from sleep. She was provided education on migraine today to include aura, prodrome, potential triggers, non-pharmacologic and pharmacologic treatment, and pathophysiology of migraine. Written information was provided as well. She will track her headaches and bring the headache diary with her to her next office visit. Follow up in six months. We discussed the expected course, resolution and complications of the diagnosis(es) in detail. Medication risks, benefits, costs, interactions, and alternatives were discussed as indicated. I advised her to contact the office if her condition worsens, changes or fails to improve as anticipated. She expressed understanding with the diagnosis(es) and plan. Maulik Garcias, was evaluated through a synchronous (real-time) audio-video encounter.  The patient (or guardian if applicable) is aware that this is a billable service, which includes

## 2024-01-03 ENCOUNTER — TELEPHONE (OUTPATIENT)
Age: 64
End: 2024-01-03

## 2024-01-03 NOTE — TELEPHONE ENCOUNTER
Aimovig PA initiated through Cover my meds key # -  [id number] - PA Case ID #: [id number]    Denied.  Scanned in to media.  Must fail ajovy, emgality, and qulipta.  Only failed ajovy

## 2024-01-03 NOTE — TELEPHONE ENCOUNTER
Patient would like to discuss migraine meds & options/ alternatives, etc. she is having issues with insurance co.     Pls call.  [phone]

## 2024-01-11 NOTE — TELEPHONE ENCOUNTER
Please appeal. Patient has been on Aimovig with excellent results for a few years now. Stopping or changing her preventative strategy would make her condition worse Emgality and Ajovy have the same mechanism of action in terms of where they effect the CGRP pathway so there is a lower degree of confidence that the Emgality would be effective

## 2024-02-21 ENCOUNTER — TELEPHONE (OUTPATIENT)
Age: 64
End: 2024-02-21

## 2024-02-21 DIAGNOSIS — G43.011 MIGRAINE WITHOUT AURA, INTRACTABLE, WITH STATUS MIGRAINOSUS: Primary | ICD-10-CM

## 2024-02-21 RX ORDER — ATOGEPANT 60 MG/1
60 TABLET ORAL DAILY
Qty: 30 TABLET | Refills: 2 | Status: SHIPPED | OUTPATIENT
Start: 2024-02-21

## 2024-02-21 NOTE — TELEPHONE ENCOUNTER
Re: Aimovig appeal - this letter was [facility]ed to [facility] Resolution dept   Case [id number] JEANINE     Called [facility] directly for status of appeal letter that was faxed on 1/9/24.    [phone].     Original appeal faxed to [phone] att: Millie  Customer Resolution Team    Denial was upheld on 1/19/24 after Millie reviewed the appeal letter and is scanned into the chart.   Case [id number]    Letter states \"Because there is no documentation of a trial and failure or contraindication to treatment with preferred formulary meds: Ajovy, Emgality And qulipta, this request does not meet guidelines and remains denied. We used her pharmacy plan formulary guideline referenced and the aailable clinical information for this determination.      Letter signed by Zeny Mendez, Complaint and Appeal Analyst, Customer Resolution Team    If you wish to submit a second level appeal within 60 days of 1-19-24, they give an address only:   [facility] Customer Resolution Team  [address] [id number]  [place]  [address]

## 2024-04-08 ENCOUNTER — TELEPHONE (OUTPATIENT)
Age: 64
End: 2024-04-08

## 2024-04-08 DIAGNOSIS — G43.011 MIGRAINE WITHOUT AURA, INTRACTABLE, WITH STATUS MIGRAINOSUS: Primary | ICD-10-CM

## 2024-04-08 NOTE — TELEPHONE ENCOUNTER
Patient called and stated she has stopped taking her medication Quilipta about 2 weeks now due to it making her constipated.    Leida would like to discuss taking another medication.    Next f/u 10/03/24

## 2024-04-09 RX ORDER — GALCANEZUMAB 120 MG/ML
120 INJECTION, SOLUTION SUBCUTANEOUS
Qty: 1 ADJUSTABLE DOSE PRE-FILLED PEN SYRINGE | Refills: 1 | Status: SHIPPED | OUTPATIENT
Start: 2024-04-09

## 2024-04-09 RX ORDER — GALCANEZUMAB 120 MG/ML
240 INJECTION, SOLUTION SUBCUTANEOUS ONCE
Qty: 2 ADJUSTABLE DOSE PRE-FILLED PEN SYRINGE | Refills: 0 | Status: SHIPPED | OUTPATIENT
Start: 2024-04-09 | End: 2024-04-09

## 2024-06-05 DIAGNOSIS — G43.011 MIGRAINE WITHOUT AURA, INTRACTABLE, WITH STATUS MIGRAINOSUS: ICD-10-CM

## 2024-06-05 RX ORDER — GALCANEZUMAB 120 MG/ML
INJECTION, SOLUTION SUBCUTANEOUS
Qty: 1 ML | Refills: 4 | Status: SHIPPED | OUTPATIENT
Start: 2024-06-05

## 2024-08-31 DIAGNOSIS — G43.011 MIGRAINE WITHOUT AURA, INTRACTABLE, WITH STATUS MIGRAINOSUS: ICD-10-CM

## 2024-09-03 RX ORDER — ELETRIPTAN HYDROBROMIDE 40 MG/1
TABLET, FILM COATED ORAL
Qty: 9 TABLET | Refills: 5 | Status: SHIPPED | OUTPATIENT
Start: 2024-09-03

## 2024-10-03 ENCOUNTER — OFFICE VISIT (OUTPATIENT)
Age: 64
End: 2024-10-03
Payer: COMMERCIAL

## 2024-10-03 VITALS
WEIGHT: 143 LBS | BODY MASS INDEX: 22.98 KG/M2 | HEIGHT: 66 IN | OXYGEN SATURATION: 100 % | HEART RATE: 86 BPM | RESPIRATION RATE: 14 BRPM

## 2024-10-03 DIAGNOSIS — G43.011 MIGRAINE WITHOUT AURA, INTRACTABLE, WITH STATUS MIGRAINOSUS: ICD-10-CM

## 2024-10-03 PROCEDURE — 99214 OFFICE O/P EST MOD 30 MIN: CPT | Performed by: PSYCHIATRY & NEUROLOGY

## 2024-10-03 PROCEDURE — 1123F ACP DISCUSS/DSCN MKR DOCD: CPT | Performed by: PSYCHIATRY & NEUROLOGY

## 2024-10-03 RX ORDER — GALCANEZUMAB 120 MG/ML
INJECTION, SOLUTION SUBCUTANEOUS
Qty: 1 ML | Refills: 11 | Status: SHIPPED | OUTPATIENT
Start: 2024-10-03

## 2024-10-03 RX ORDER — UBROGEPANT 100 MG/1
TABLET ORAL
Qty: 16 TABLET | Refills: 11 | Status: SHIPPED | OUTPATIENT
Start: 2024-10-03

## 2024-10-03 NOTE — PROGRESS NOTES
[facility] at [place]    [facility] at [address] [place] [address] [address] [facility] [address] [place] [address] [phone]              Chief Complaint   Patient presents with    Migraine     Annual migraine follow up // LOV w/ Carolyn virtually 10/2023// stable on Emgality w/ Relpax- not effective  // Zembrace no longer covered and wondering if she can get that or a preventative //      Current Outpatient Medications   Medication Sig Dispense Refill    eletriptan (RELPAX) 40 MG tablet TAKE 1 TABLET BY MOUTH AT ONSET OF MIGRAINE; MAY REPEAT 1 TABLET IN 2 HOURS IF NEEDED. *MAX OF 2 DOSES IN 24 HOURS FOR 30 DAYS 9 tablet 5    Galcanezumab-gnlm (EMGALITY) 120 MG/ML SOAJ INJECT 1 ML INTO THE SKIN ONCE A MONTH 1 mL 4    SUMAtriptan Succinate (ZEMBRACE SYMTOUCH) 3 MG/0.5ML SOAJ 1 at HA onset and repeat in 1 hour x 1 prn  Max 4 in 24 hours 6 Adjustable Dose Pre-filled Pen Syringe 5    albuterol sulfate HFA (PROVENTIL;VENTOLIN;PROAIR) 108 (90 Base) MCG/ACT inhaler Inhale 1 puff into the lungs every 4 hours as needed      albuterol (PROVENTIL) (2.5 MG/3ML) 0.083% nebulizer solution ceived the following from [facility]: Outside name: albuterol (PROVENTIL VENTOLIN) 2.5 mg /3 mL (0.083 %) nebu      ascorbic acid (VITAMIN C) 500 MG tablet Take 2 tablets by mouth daily      Cholecalciferol 625 MCG (48159 UT) CAPS Take 2 tablets by mouth daily      cyclobenzaprine (FLEXERIL) 10 MG tablet ceived the following from [facility]: Outside name: cyclobenzaprine (FLEXERIL) 10 mg tablet      EPINEPHrine (EPIPEN) 0.3 MG/0.3ML SOAJ injection ceived the following from [facility]: Outside name: EPINEPHrine (EPIPEN) 0.3 mg/0.3 mL injection      Estradiol (VAGIFEM) 10 MCG TABS vaginal tablet Place 1 tablet vaginally daily ceived the following from [facility]:

## 2025-02-04 ENCOUNTER — TELEPHONE (OUTPATIENT)
Age: 65
End: 2025-02-04

## 2025-02-06 NOTE — TELEPHONE ENCOUNTER
Returned call and left a message to let the patient know Carolyn had to cancel that day due to being sick. I told her to call back to reschedule.

## 2025-02-28 ENCOUNTER — TELEPHONE (OUTPATIENT)
Age: 65
End: 2025-02-28

## 2025-02-28 NOTE — TELEPHONE ENCOUNTER
Patient called on 2/28/25 at 8:20 am very upset because NP Gustabo never connected to the VV call. She stated this the second time  and no VV Visit.    She's requesting a call today at 9:30 am to discuss.    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

## 2025-04-08 ENCOUNTER — HOSPITAL ENCOUNTER (INPATIENT)
Facility: HOSPITAL | Age: 65
LOS: 3 days | Discharge: HOME | DRG: 872 | End: 2025-04-11
Attending: EMERGENCY MEDICINE | Admitting: HOSPITALIST
Payer: COMMERCIAL

## 2025-04-08 ENCOUNTER — APPOINTMENT (OUTPATIENT)
Facility: HOSPITAL | Age: 65
DRG: 872 | End: 2025-04-08
Payer: COMMERCIAL

## 2025-04-08 DIAGNOSIS — R00.0 TACHYCARDIA: ICD-10-CM

## 2025-04-08 DIAGNOSIS — R50.9 FEVER, UNSPECIFIED FEVER CAUSE: Primary | ICD-10-CM

## 2025-04-08 DIAGNOSIS — N12 PYELONEPHRITIS: ICD-10-CM

## 2025-04-08 LAB
ALBUMIN SERPL-MCNC: 3.8 G/DL (ref 3.5–5)
ALBUMIN/GLOB SERPL: 1 (ref 1.1–2.2)
ALP SERPL-CCNC: 73 U/L (ref 45–117)
ALT SERPL-CCNC: 19 U/L (ref 12–78)
ANION GAP SERPL CALC-SCNC: 12 MMOL/L (ref 2–12)
APPEARANCE UR: CLEAR
APPEARANCE UR: CLEAR
AST SERPL-CCNC: 23 U/L (ref 15–37)
BACTERIA URNS QL MICRO: NEGATIVE /HPF
BACTERIA URNS QL MICRO: NEGATIVE /HPF
BASOPHILS # BLD: 0.04 K/UL (ref 0–0.1)
BASOPHILS NFR BLD: 0.4 % (ref 0–1)
BILIRUB SERPL-MCNC: 1.1 MG/DL (ref 0.2–1)
BILIRUB UR QL CFM: NEGATIVE
BILIRUB UR QL CFM: NEGATIVE
BUN SERPL-MCNC: 19 MG/DL (ref 6–20)
BUN/CREAT SERPL: 16 (ref 12–20)
CALCIUM SERPL-MCNC: 9.4 MG/DL (ref 8.5–10.1)
CHLORIDE SERPL-SCNC: 97 MMOL/L (ref 97–108)
CO2 SERPL-SCNC: 25 MMOL/L (ref 21–32)
COLOR UR: ABNORMAL
COLOR UR: ABNORMAL
CREAT SERPL-MCNC: 1.18 MG/DL (ref 0.55–1.02)
DIFFERENTIAL METHOD BLD: ABNORMAL
EOSINOPHIL # BLD: 0 K/UL (ref 0–0.4)
EOSINOPHIL NFR BLD: 0 % (ref 0–7)
EPITH CASTS URNS QL MICRO: ABNORMAL /LPF
EPITH CASTS URNS QL MICRO: ABNORMAL /LPF
ERYTHROCYTE [DISTWIDTH] IN BLOOD BY AUTOMATED COUNT: 12.4 % (ref 11.5–14.5)
FLUAV RNA SPEC QL NAA+PROBE: NOT DETECTED
FLUBV RNA SPEC QL NAA+PROBE: NOT DETECTED
GLOBULIN SER CALC-MCNC: 4 G/DL (ref 2–4)
GLUCOSE SERPL-MCNC: 100 MG/DL (ref 65–100)
GLUCOSE UR STRIP.AUTO-MCNC: NEGATIVE MG/DL
GLUCOSE UR STRIP.AUTO-MCNC: NEGATIVE MG/DL
HCT VFR BLD AUTO: 38.4 % (ref 35–47)
HGB BLD-MCNC: 13.4 G/DL (ref 11.5–16)
HGB UR QL STRIP: ABNORMAL
HGB UR QL STRIP: ABNORMAL
HYALINE CASTS URNS QL MICRO: ABNORMAL /LPF (ref 0–2)
IMM GRANULOCYTES # BLD AUTO: 0.05 K/UL (ref 0–0.04)
IMM GRANULOCYTES NFR BLD AUTO: 0.5 % (ref 0–0.5)
KETONES UR QL STRIP.AUTO: 15 MG/DL
KETONES UR QL STRIP.AUTO: 40 MG/DL
LACTATE BLD-SCNC: 1.07 MMOL/L (ref 0.4–2)
LACTATE SERPL-SCNC: 0.6 MMOL/L (ref 0.4–2)
LACTATE SERPL-SCNC: 0.9 MMOL/L (ref 0.4–2)
LEUKOCYTE ESTERASE UR QL STRIP.AUTO: ABNORMAL
LEUKOCYTE ESTERASE UR QL STRIP.AUTO: ABNORMAL
LYMPHOCYTES # BLD: 0.64 K/UL (ref 0.8–3.5)
LYMPHOCYTES NFR BLD: 5.9 % (ref 12–49)
MCH RBC QN AUTO: 31.7 PG (ref 26–34)
MCHC RBC AUTO-ENTMCNC: 34.9 G/DL (ref 30–36.5)
MCV RBC AUTO: 90.8 FL (ref 80–99)
MONOCYTES # BLD: 0.75 K/UL (ref 0–1)
MONOCYTES NFR BLD: 6.9 % (ref 5–13)
NEUTS SEG # BLD: 9.41 K/UL (ref 1.8–8)
NEUTS SEG NFR BLD: 86.3 % (ref 32–75)
NITRITE UR QL STRIP.AUTO: NEGATIVE
NITRITE UR QL STRIP.AUTO: NEGATIVE
NRBC # BLD: 0 K/UL (ref 0–0.01)
NRBC BLD-RTO: 0 PER 100 WBC
PH UR STRIP: 6 (ref 5–8)
PH UR STRIP: 6 (ref 5–8)
PLATELET # BLD AUTO: 203 K/UL (ref 150–400)
PMV BLD AUTO: 10.8 FL (ref 8.9–12.9)
POTASSIUM SERPL-SCNC: 3.6 MMOL/L (ref 3.5–5.1)
PROT SERPL-MCNC: 7.8 G/DL (ref 6.4–8.2)
PROT UR STRIP-MCNC: 30 MG/DL
PROT UR STRIP-MCNC: NEGATIVE MG/DL
RBC # BLD AUTO: 4.23 M/UL (ref 3.8–5.2)
RBC #/AREA URNS HPF: ABNORMAL /HPF (ref 0–5)
RBC #/AREA URNS HPF: ABNORMAL /HPF (ref 0–5)
RBC MORPH BLD: ABNORMAL
SARS-COV-2 RNA RESP QL NAA+PROBE: NOT DETECTED
SODIUM SERPL-SCNC: 134 MMOL/L (ref 136–145)
SOURCE: NORMAL
SP GR UR REFRACTOMETRY: 1.01 (ref 1–1.03)
SP GR UR REFRACTOMETRY: 1.02 (ref 1–1.03)
URINE CULTURE IF INDICATED: ABNORMAL
UROBILINOGEN UR QL STRIP.AUTO: 0.2 EU/DL (ref 0.2–1)
UROBILINOGEN UR QL STRIP.AUTO: 0.2 EU/DL (ref 0.2–1)
WBC # BLD AUTO: 10.9 K/UL (ref 3.6–11)
WBC URNS QL MICRO: ABNORMAL /HPF (ref 0–4)
WBC URNS QL MICRO: ABNORMAL /HPF (ref 0–4)

## 2025-04-08 PROCEDURE — 87086 URINE CULTURE/COLONY COUNT: CPT

## 2025-04-08 PROCEDURE — 87088 URINE BACTERIA CULTURE: CPT

## 2025-04-08 PROCEDURE — 2580000003 HC RX 258: Performed by: EMERGENCY MEDICINE

## 2025-04-08 PROCEDURE — 87040 BLOOD CULTURE FOR BACTERIA: CPT

## 2025-04-08 PROCEDURE — 81001 URINALYSIS AUTO W/SCOPE: CPT

## 2025-04-08 PROCEDURE — 94761 N-INVAS EAR/PLS OXIMETRY MLT: CPT

## 2025-04-08 PROCEDURE — 85025 COMPLETE CBC W/AUTO DIFF WBC: CPT

## 2025-04-08 PROCEDURE — 80053 COMPREHEN METABOLIC PANEL: CPT

## 2025-04-08 PROCEDURE — 71045 X-RAY EXAM CHEST 1 VIEW: CPT

## 2025-04-08 PROCEDURE — 87186 SC STD MICRODIL/AGAR DIL: CPT

## 2025-04-08 PROCEDURE — 6360000002 HC RX W HCPCS: Performed by: EMERGENCY MEDICINE

## 2025-04-08 PROCEDURE — 6370000000 HC RX 637 (ALT 250 FOR IP): Performed by: HOSPITALIST

## 2025-04-08 PROCEDURE — 36415 COLL VENOUS BLD VENIPUNCTURE: CPT

## 2025-04-08 PROCEDURE — 6360000002 HC RX W HCPCS: Performed by: HOSPITALIST

## 2025-04-08 PROCEDURE — 6360000004 HC RX CONTRAST MEDICATION: Performed by: EMERGENCY MEDICINE

## 2025-04-08 PROCEDURE — 74177 CT ABD & PELVIS W/CONTRAST: CPT

## 2025-04-08 PROCEDURE — 2580000003 HC RX 258: Performed by: HOSPITALIST

## 2025-04-08 PROCEDURE — 1100000000 HC RM PRIVATE

## 2025-04-08 PROCEDURE — 99285 EMERGENCY DEPT VISIT HI MDM: CPT

## 2025-04-08 PROCEDURE — 83605 ASSAY OF LACTIC ACID: CPT

## 2025-04-08 PROCEDURE — 05HB33Z INSERTION OF INFUSION DEVICE INTO RIGHT BASILIC VEIN, PERCUTANEOUS APPROACH: ICD-10-PCS | Performed by: HOSPITALIST

## 2025-04-08 PROCEDURE — 2500000003 HC RX 250 WO HCPCS: Performed by: HOSPITALIST

## 2025-04-08 PROCEDURE — 96360 HYDRATION IV INFUSION INIT: CPT

## 2025-04-08 PROCEDURE — 87636 SARSCOV2 & INF A&B AMP PRB: CPT

## 2025-04-08 PROCEDURE — 6370000000 HC RX 637 (ALT 250 FOR IP): Performed by: EMERGENCY MEDICINE

## 2025-04-08 RX ORDER — SODIUM CHLORIDE 9 MG/ML
INJECTION, SOLUTION INTRAVENOUS CONTINUOUS
Status: DISPENSED | OUTPATIENT
Start: 2025-04-08 | End: 2025-04-09

## 2025-04-08 RX ORDER — KETOROLAC TROMETHAMINE 15 MG/ML
15 INJECTION, SOLUTION INTRAMUSCULAR; INTRAVENOUS ONCE
Status: COMPLETED | OUTPATIENT
Start: 2025-04-08 | End: 2025-04-08

## 2025-04-08 RX ORDER — POLYETHYLENE GLYCOL 3350 17 G/17G
17 POWDER, FOR SOLUTION ORAL DAILY PRN
Status: DISCONTINUED | OUTPATIENT
Start: 2025-04-08 | End: 2025-04-11 | Stop reason: HOSPADM

## 2025-04-08 RX ORDER — ACETAMINOPHEN 650 MG/1
650 SUPPOSITORY RECTAL EVERY 6 HOURS PRN
Status: DISCONTINUED | OUTPATIENT
Start: 2025-04-08 | End: 2025-04-11 | Stop reason: HOSPADM

## 2025-04-08 RX ORDER — IOPAMIDOL 755 MG/ML
100 INJECTION, SOLUTION INTRAVASCULAR
Status: COMPLETED | OUTPATIENT
Start: 2025-04-08 | End: 2025-04-08

## 2025-04-08 RX ORDER — SODIUM CHLORIDE 0.9 % (FLUSH) 0.9 %
5-40 SYRINGE (ML) INJECTION EVERY 12 HOURS SCHEDULED
Status: DISCONTINUED | OUTPATIENT
Start: 2025-04-08 | End: 2025-04-11 | Stop reason: HOSPADM

## 2025-04-08 RX ORDER — EZETIMIBE 10 MG/1
10 TABLET ORAL DAILY
Status: DISCONTINUED | OUTPATIENT
Start: 2025-04-09 | End: 2025-04-11 | Stop reason: HOSPADM

## 2025-04-08 RX ORDER — MAGNESIUM SULFATE IN WATER 40 MG/ML
2000 INJECTION, SOLUTION INTRAVENOUS PRN
Status: DISCONTINUED | OUTPATIENT
Start: 2025-04-08 | End: 2025-04-11 | Stop reason: HOSPADM

## 2025-04-08 RX ORDER — SODIUM CHLORIDE 0.9 % (FLUSH) 0.9 %
5-40 SYRINGE (ML) INJECTION PRN
Status: DISCONTINUED | OUTPATIENT
Start: 2025-04-08 | End: 2025-04-11 | Stop reason: HOSPADM

## 2025-04-08 RX ORDER — 0.9 % SODIUM CHLORIDE 0.9 %
1000 INTRAVENOUS SOLUTION INTRAVENOUS ONCE
Status: COMPLETED | OUTPATIENT
Start: 2025-04-08 | End: 2025-04-08

## 2025-04-08 RX ORDER — SODIUM CHLORIDE 9 MG/ML
INJECTION, SOLUTION INTRAVENOUS PRN
Status: DISCONTINUED | OUTPATIENT
Start: 2025-04-08 | End: 2025-04-11 | Stop reason: HOSPADM

## 2025-04-08 RX ORDER — HYDROXYCHLOROQUINE SULFATE 200 MG/1
200 TABLET, FILM COATED ORAL DAILY
Status: DISCONTINUED | OUTPATIENT
Start: 2025-04-09 | End: 2025-04-11 | Stop reason: HOSPADM

## 2025-04-08 RX ORDER — ONDANSETRON 4 MG/1
4 TABLET, ORALLY DISINTEGRATING ORAL EVERY 8 HOURS PRN
Status: DISCONTINUED | OUTPATIENT
Start: 2025-04-08 | End: 2025-04-11 | Stop reason: HOSPADM

## 2025-04-08 RX ORDER — LOSARTAN POTASSIUM 50 MG/1
50 TABLET ORAL DAILY
Status: DISCONTINUED | OUTPATIENT
Start: 2025-04-09 | End: 2025-04-11 | Stop reason: HOSPADM

## 2025-04-08 RX ORDER — POTASSIUM CHLORIDE 750 MG/1
40 TABLET, EXTENDED RELEASE ORAL PRN
Status: DISCONTINUED | OUTPATIENT
Start: 2025-04-08 | End: 2025-04-11 | Stop reason: HOSPADM

## 2025-04-08 RX ORDER — LEVOFLOXACIN 5 MG/ML
500 INJECTION, SOLUTION INTRAVENOUS
Status: COMPLETED | OUTPATIENT
Start: 2025-04-08 | End: 2025-04-08

## 2025-04-08 RX ORDER — ONDANSETRON 2 MG/ML
4 INJECTION INTRAMUSCULAR; INTRAVENOUS EVERY 6 HOURS PRN
Status: DISCONTINUED | OUTPATIENT
Start: 2025-04-08 | End: 2025-04-11 | Stop reason: HOSPADM

## 2025-04-08 RX ORDER — ENOXAPARIN SODIUM 100 MG/ML
40 INJECTION SUBCUTANEOUS DAILY
Status: DISCONTINUED | OUTPATIENT
Start: 2025-04-09 | End: 2025-04-11 | Stop reason: HOSPADM

## 2025-04-08 RX ORDER — ACETAMINOPHEN 500 MG
1000 TABLET ORAL
Status: COMPLETED | OUTPATIENT
Start: 2025-04-08 | End: 2025-04-08

## 2025-04-08 RX ORDER — ALBUTEROL SULFATE 0.83 MG/ML
2.5 SOLUTION RESPIRATORY (INHALATION) EVERY 4 HOURS PRN
Status: DISCONTINUED | OUTPATIENT
Start: 2025-04-08 | End: 2025-04-11 | Stop reason: HOSPADM

## 2025-04-08 RX ORDER — ACETAMINOPHEN 325 MG/1
650 TABLET ORAL EVERY 6 HOURS PRN
Status: DISCONTINUED | OUTPATIENT
Start: 2025-04-08 | End: 2025-04-11 | Stop reason: HOSPADM

## 2025-04-08 RX ORDER — POTASSIUM CHLORIDE 7.45 MG/ML
10 INJECTION INTRAVENOUS PRN
Status: DISCONTINUED | OUTPATIENT
Start: 2025-04-08 | End: 2025-04-11 | Stop reason: HOSPADM

## 2025-04-08 RX ADMIN — LEVOFLOXACIN 500 MG: 500 INJECTION, SOLUTION INTRAVENOUS at 14:37

## 2025-04-08 RX ADMIN — KETOROLAC TROMETHAMINE 15 MG: 15 INJECTION, SOLUTION INTRAMUSCULAR; INTRAVENOUS at 17:15

## 2025-04-08 RX ADMIN — SODIUM CHLORIDE: 0.9 INJECTION, SOLUTION INTRAVENOUS at 17:51

## 2025-04-08 RX ADMIN — SODIUM CHLORIDE, PRESERVATIVE FREE 10 ML: 5 INJECTION INTRAVENOUS at 21:15

## 2025-04-08 RX ADMIN — AZTREONAM 1000 MG: 1 INJECTION, POWDER, LYOPHILIZED, FOR SOLUTION INTRAMUSCULAR; INTRAVENOUS at 17:52

## 2025-04-08 RX ADMIN — IOPAMIDOL 100 ML: 755 INJECTION, SOLUTION INTRAVENOUS at 12:49

## 2025-04-08 RX ADMIN — ACETAMINOPHEN 1000 MG: 500 TABLET ORAL at 12:27

## 2025-04-08 RX ADMIN — SODIUM CHLORIDE 1000 ML: 0.9 INJECTION, SOLUTION INTRAVENOUS at 11:06

## 2025-04-08 RX ADMIN — ACETAMINOPHEN 650 MG: 325 TABLET ORAL at 21:15

## 2025-04-08 ASSESSMENT — PAIN SCALES - GENERAL
PAINLEVEL_OUTOF10: 8
PAINLEVEL_OUTOF10: 4
PAINLEVEL_OUTOF10: 8
PAINLEVEL_OUTOF10: 8

## 2025-04-08 ASSESSMENT — ENCOUNTER SYMPTOMS
NAUSEA: 0
SHORTNESS OF BREATH: 0
COUGH: 0
EYE DISCHARGE: 0
FACIAL SWELLING: 0
SORE THROAT: 0
ABDOMINAL PAIN: 0
BACK PAIN: 0
VOMITING: 0
EYE PAIN: 0
CHEST TIGHTNESS: 0
DIARRHEA: 0

## 2025-04-08 ASSESSMENT — PAIN DESCRIPTION - DESCRIPTORS
DESCRIPTORS: ACHING
DESCRIPTORS_3: ACHING
DESCRIPTORS_2: ACHING
DESCRIPTORS: SHARP

## 2025-04-08 ASSESSMENT — PAIN DESCRIPTION - INTENSITY
RATING_3: 10
RATING_2: 7

## 2025-04-08 ASSESSMENT — PAIN DESCRIPTION - LOCATION
LOCATION_3: BACK
LOCATION: HEAD
LOCATION: HEAD
LOCATION_2: ABDOMEN
LOCATION: HEAD
LOCATION: HEAD

## 2025-04-08 ASSESSMENT — LIFESTYLE VARIABLES: HOW OFTEN DO YOU HAVE A DRINK CONTAINING ALCOHOL: NEVER

## 2025-04-08 ASSESSMENT — PAIN DESCRIPTION - ORIENTATION
ORIENTATION_3: LOWER
ORIENTATION: RIGHT;LEFT;LOWER;UPPER

## 2025-04-08 ASSESSMENT — PAIN - FUNCTIONAL ASSESSMENT
PAIN_FUNCTIONAL_ASSESSMENT: 0-10
PAIN_FUNCTIONAL_ASSESSMENT: PREVENTS OR INTERFERES SOME ACTIVE ACTIVITIES AND ADLS

## 2025-04-08 NOTE — ED PROVIDER NOTES
daily basis.  She is concerned that her condition may be changing because now she is having some pain in her flank and abdomen as well as sensation of dysuria worse than usual.  Today the urine shows no evidence of significant UTI and her white blood cell count is normal, however her CAT scan shows areas of hypoperfusion in her left kidney concerning for parenchymal infection of that kidney.  She was afebrile when she presented but her temperature at its peak was noted to be overall 104 °F.  Initial lactic acid 0.9 follow-up 1.07 she was given Tylenol for the fever.  Patient will be given IV antibiotics in the ER and admitted.     Total critical care time spent exclusive of procedures:  75 minutes.     FINAL IMPRESSION      1. Fever, unspecified fever cause    2. Pyelonephritis    3. Tachycardia          DISPOSITION/PLAN   DISPOSITION Decision To Admit 04/08/2025 02:15:30 PM      PATIENT REFERRED TO:  No follow-up provider specified.    DISCHARGE MEDICATIONS:  New Prescriptions    No medications on file             (Please note that portions of this note were completed with a voice recognition program.  Efforts were made to edit the dictations but occasionally words are mis-transcribed.)    Bhaskar Hoskins MD (electronically signed)  Emergency Attending Physician / Physician Assistant / Nurse Practitioner              Bhaskar Hoskins MD  04/08/25 4202       Bhaskar Hoskins MD  04/08/25 2944

## 2025-04-08 NOTE — H&P
Hospitalist Admission Note      NAME:  Leida Avila   :  1960   MRN:  [id number]     Date/Time:  2025 5:03 PM    Patient PCP: Calli Walker MD    ________________________________________________________________________    Given the patient's current clinical presentation, I have a high level of concern for decompensation if discharged from the emergency department.  Complex decision making was performed, which includes reviewing the patient's available past medical records, laboratory results, and x-ray films.       My assessment of this patient's clinical condition and my plan of care is as follows.    Assessment / Plan:  Patient is a 64-year-old female with a history of hypertension, rheumatoid arthritis, hyperlipidemia, CKD stage III comes to the hospital with sepsis due to pyelonephritis.  Patient admitted for IV hydration and IV antibiotics.    1.  Sepsis related to pyelonephritis  Heart rate was 119, temperature 104.5 when she arrived to the ER.  Lactic acid was 1.07.  Start IV fluids, IV antibiotics.  Follow-up blood cultures.    2.  Hypertension  Patient is on losartan    3.  CKD stage III  Avoid nephrotoxic medications.    4.  Headache  Flu and COVID is negative.    IV Toradol as needed.    5.  Rheumatoid arthritis  Patient is taking Plaquenil at home.    6.  Hyperlipidemia  Patient is taking Zetia 10.          I have personally reviewed the radiographs, laboratory data in Epic and decisions and statements above are based partially on this personal interpretation.    Code Status: Full Code  DVT Prophylaxis: Lovenox  GI Prophylaxis: not indicated    Plan of care discussed with the patient at bedside.   Subjective:   CHIEF COMPLAINT: \"Left flank pain\"    HISTORY OF PRESENT ILLNESS:     Leida is a 64 y.o.   female with PMHx multiple UTIs, hypertension, rheumatoid arthritis, anxiety comes to the hospital with chief complaint of left flank pain.  It is going on for almost 3 to 4 days.

## 2025-04-08 NOTE — ED NOTES
TRANSFER - OUT REPORT:    Verbal report given to [facility] Staff on Leida Avila  being transferred to [facility] 5th Floor for routine progression of patient care       Report consisted of patient's Situation, Background, Assessment and   Recommendations(SBAR).     Information from the following report(s) ED Encounter Summary was reviewed with the receiving nurse.    [place] Fall Assessment:    Presents to emergency department  because of falls (Syncope, seizure, or loss of consciousness): No  Age > 70: No  Altered Mental Status, Intoxication with alcohol or substance confusion (Disorientation, impaired judgment, poor safety awaremess, or inability to follow instructions): No  Impaired Mobility: Ambulates or transfers with assistive devices or assistance; Unable to ambulate or transer.: No  Nursing Judgement: No          Lines:   Peripheral IV 04/08/25 Right Antecubital (Active)       Peripheral IV 04/08/25 Right;Ventral Forearm (Active)        Opportunity for questions and clarification was provided.      Patient transported wit

## 2025-04-08 NOTE — ED NOTES
Rounded on patient. IV fluid completed. Discussed time factors for results and physician re-evaluation. Pt reports feeling hot, temperature taken, 104.5 oral. Primary nurse and physician notified. Vital signs updated. Call bell in reach

## 2025-04-08 NOTE — ED TRIAGE NOTES
Patient presents ambulatory to treatment area with a steady gait. Patient states she believes she has a UTI and has a history of urosepsis. Complains of severe back pain, headache, body aches, fever, chills. Denies marked dysuria. States \"I feel something happening in there, but there is no burning.\"

## 2025-04-08 NOTE — ED NOTES
TRANSFER - OUT REPORT:    Verbal report given to Maryam JANE on Leida Avila  being transferred to [facility] 5th Floor for routine progression of patient care       Report consisted of patient's Situation, Background, Assessment and   Recommendations(SBAR).     Information from the following report(s) ED Encounter Summary was reviewed with the receiving nurse.    [place] Fall Assessment:    Presents to emergency department  because of falls (Syncope, seizure, or loss of consciousness): No  Age > 70: No  Altered Mental Status, Intoxication with alcohol or substance confusion (Disorientation, impaired judgment, poor safety awaremess, or inability to follow instructions): No  Impaired Mobility: Ambulates or transfers with assistive devices or assistance; Unable to ambulate or transer.: No  Nursing Judgement: No          Lines:   Peripheral IV 04/08/25 Right Antecubital (Active)       Peripheral IV 04/08/25 Right;Ventral Forearm (Active)        Opportunity for questions and clarification was provided.      Patient transported with:

## 2025-04-09 LAB
ANION GAP SERPL CALC-SCNC: 5 MMOL/L (ref 2–12)
BASOPHILS # BLD: 0.04 K/UL (ref 0–0.1)
BASOPHILS NFR BLD: 0.7 % (ref 0–1)
BUN SERPL-MCNC: 21 MG/DL (ref 6–20)
BUN/CREAT SERPL: 26 (ref 12–20)
CALCIUM SERPL-MCNC: 8.7 MG/DL (ref 8.5–10.1)
CHLORIDE SERPL-SCNC: 109 MMOL/L (ref 97–108)
CO2 SERPL-SCNC: 25 MMOL/L (ref 21–32)
CREAT SERPL-MCNC: 0.8 MG/DL (ref 0.55–1.02)
DIFFERENTIAL METHOD BLD: ABNORMAL
EOSINOPHIL # BLD: 0.07 K/UL (ref 0–0.4)
EOSINOPHIL NFR BLD: 1.3 % (ref 0–7)
ERYTHROCYTE [DISTWIDTH] IN BLOOD BY AUTOMATED COUNT: 12.3 % (ref 11.5–14.5)
GLUCOSE SERPL-MCNC: 87 MG/DL (ref 65–100)
HCT VFR BLD AUTO: 33.2 % (ref 35–47)
HGB BLD-MCNC: 11.2 G/DL (ref 11.5–16)
IMM GRANULOCYTES # BLD AUTO: 0.02 K/UL (ref 0–0.04)
IMM GRANULOCYTES NFR BLD AUTO: 0.4 % (ref 0–0.5)
LYMPHOCYTES # BLD: 0.9 K/UL (ref 0.8–3.5)
LYMPHOCYTES NFR BLD: 16.4 % (ref 12–49)
MCH RBC QN AUTO: 31.7 PG (ref 26–34)
MCHC RBC AUTO-ENTMCNC: 33.7 G/DL (ref 30–36.5)
MCV RBC AUTO: 94.1 FL (ref 80–99)
MONOCYTES # BLD: 0.51 K/UL (ref 0–1)
MONOCYTES NFR BLD: 9.3 % (ref 5–13)
NEUTS SEG # BLD: 3.94 K/UL (ref 1.8–8)
NEUTS SEG NFR BLD: 71.9 % (ref 32–75)
NRBC # BLD: 0 K/UL (ref 0–0.01)
NRBC BLD-RTO: 0 PER 100 WBC
PLATELET # BLD AUTO: 160 K/UL (ref 150–400)
PMV BLD AUTO: 10.7 FL (ref 8.9–12.9)
POTASSIUM SERPL-SCNC: 3.7 MMOL/L (ref 3.5–5.1)
RBC # BLD AUTO: 3.53 M/UL (ref 3.8–5.2)
SODIUM SERPL-SCNC: 139 MMOL/L (ref 136–145)
WBC # BLD AUTO: 5.5 K/UL (ref 3.6–11)

## 2025-04-09 PROCEDURE — 6370000000 HC RX 637 (ALT 250 FOR IP): Performed by: HOSPITALIST

## 2025-04-09 PROCEDURE — 6370000000 HC RX 637 (ALT 250 FOR IP): Performed by: INTERNAL MEDICINE

## 2025-04-09 PROCEDURE — 2500000003 HC RX 250 WO HCPCS: Performed by: HOSPITALIST

## 2025-04-09 PROCEDURE — 1100000000 HC RM PRIVATE

## 2025-04-09 PROCEDURE — 6360000002 HC RX W HCPCS: Performed by: NURSE PRACTITIONER

## 2025-04-09 PROCEDURE — 6360000002 HC RX W HCPCS: Performed by: INTERNAL MEDICINE

## 2025-04-09 PROCEDURE — 80048 BASIC METABOLIC PNL TOTAL CA: CPT

## 2025-04-09 PROCEDURE — 94761 N-INVAS EAR/PLS OXIMETRY MLT: CPT

## 2025-04-09 PROCEDURE — 6360000002 HC RX W HCPCS: Performed by: HOSPITALIST

## 2025-04-09 PROCEDURE — 2580000003 HC RX 258: Performed by: HOSPITALIST

## 2025-04-09 PROCEDURE — 85025 COMPLETE CBC W/AUTO DIFF WBC: CPT

## 2025-04-09 RX ORDER — KETOROLAC TROMETHAMINE 15 MG/ML
15 INJECTION, SOLUTION INTRAMUSCULAR; INTRAVENOUS EVERY 6 HOURS PRN
Status: DISCONTINUED | OUTPATIENT
Start: 2025-04-09 | End: 2025-04-11 | Stop reason: HOSPADM

## 2025-04-09 RX ORDER — LEVOFLOXACIN 5 MG/ML
500 INJECTION, SOLUTION INTRAVENOUS EVERY 24 HOURS
Status: DISCONTINUED | OUTPATIENT
Start: 2025-04-09 | End: 2025-04-10

## 2025-04-09 RX ORDER — BUTALBITAL, ACETAMINOPHEN AND CAFFEINE 50; 325; 40 MG/1; MG/1; MG/1
1 TABLET ORAL EVERY 4 HOURS PRN
Status: DISCONTINUED | OUTPATIENT
Start: 2025-04-09 | End: 2025-04-11 | Stop reason: HOSPADM

## 2025-04-09 RX ADMIN — SODIUM CHLORIDE, PRESERVATIVE FREE 10 ML: 5 INJECTION INTRAVENOUS at 08:30

## 2025-04-09 RX ADMIN — BUTALBITAL, ACETAMINOPHEN, AND CAFFEINE 1 TABLET: 325; 50; 40 TABLET ORAL at 11:17

## 2025-04-09 RX ADMIN — LEVOFLOXACIN 500 MG: 500 INJECTION, SOLUTION INTRAVENOUS at 14:52

## 2025-04-09 RX ADMIN — ENOXAPARIN SODIUM 40 MG: 100 INJECTION SUBCUTANEOUS at 08:27

## 2025-04-09 RX ADMIN — KETOROLAC TROMETHAMINE 15 MG: 15 INJECTION, SOLUTION INTRAMUSCULAR; INTRAVENOUS at 05:29

## 2025-04-09 RX ADMIN — ACETAMINOPHEN 650 MG: 325 TABLET ORAL at 20:30

## 2025-04-09 RX ADMIN — EZETIMIBE 10 MG: 10 TABLET ORAL at 08:26

## 2025-04-09 RX ADMIN — HYDROXYCHLOROQUINE SULFATE 200 MG: 200 TABLET ORAL at 08:26

## 2025-04-09 RX ADMIN — AZTREONAM 1000 MG: 1 INJECTION, POWDER, LYOPHILIZED, FOR SOLUTION INTRAMUSCULAR; INTRAVENOUS at 08:29

## 2025-04-09 RX ADMIN — LOSARTAN POTASSIUM 50 MG: 50 TABLET, FILM COATED ORAL at 08:26

## 2025-04-09 RX ADMIN — SODIUM CHLORIDE, PRESERVATIVE FREE 10 ML: 5 INJECTION INTRAVENOUS at 20:30

## 2025-04-09 RX ADMIN — AZTREONAM 1000 MG: 1 INJECTION, POWDER, LYOPHILIZED, FOR SOLUTION INTRAMUSCULAR; INTRAVENOUS at 01:39

## 2025-04-09 ASSESSMENT — PAIN SCALES - GENERAL
PAINLEVEL_OUTOF10: 1
PAINLEVEL_OUTOF10: 7
PAINLEVEL_OUTOF10: 5
PAINLEVEL_OUTOF10: 7

## 2025-04-09 ASSESSMENT — PAIN DESCRIPTION - ORIENTATION: ORIENTATION: RIGHT;LEFT;ANTERIOR;POSTERIOR

## 2025-04-09 ASSESSMENT — PAIN DESCRIPTION - DESCRIPTORS
DESCRIPTORS: ACHING
DESCRIPTORS: ACHING

## 2025-04-09 ASSESSMENT — PAIN DESCRIPTION - LOCATION
LOCATION: HEAD

## 2025-04-09 ASSESSMENT — PAIN - FUNCTIONAL ASSESSMENT: PAIN_FUNCTIONAL_ASSESSMENT: PREVENTS OR INTERFERES SOME ACTIVE ACTIVITIES AND ADLS

## 2025-04-09 NOTE — CARE COORDINATION
4/9/2025  2:59 PM      ICD-10-CM    1. Fever, unspecified fever cause  R50.9       2. Pyelonephritis  N12       3. Tachycardia  R00.0           General Risk Score: 2   Values used to calculate this score:    Points  Metrics       1        Age: 64       1        Hospital Admissions: 1       0        ED Visits: 0       0        Has Chronic Obstructive Pulmonary Disease: No       0        Has Diabetes Excluding Gestational Diabetes: No       0        Has Congestive Heart Failure: No       0        Has Liver Disease: No       0        Has Depression: No       0        Current PCP: Calli Walker MD       0        Has Medicaid: No      CM reviewed EMR. No CM needs indicated at this time. Per IDR, estimated discharge date is 4/11/25. Providers, if needs arise, please consult case management.     BECKY Hood, CM  [facility] Care Manager  [phone]

## 2025-04-10 LAB
ANION GAP SERPL CALC-SCNC: 7 MMOL/L (ref 2–12)
BACTERIA SPEC CULT: ABNORMAL
BASOPHILS # BLD: 0.03 K/UL (ref 0–0.1)
BASOPHILS NFR BLD: 0.7 % (ref 0–1)
BUN SERPL-MCNC: 14 MG/DL (ref 6–20)
BUN/CREAT SERPL: 21 (ref 12–20)
CALCIUM SERPL-MCNC: 8.7 MG/DL (ref 8.5–10.1)
CC UR VC: ABNORMAL
CHLORIDE SERPL-SCNC: 112 MMOL/L (ref 97–108)
CO2 SERPL-SCNC: 22 MMOL/L (ref 21–32)
CREAT SERPL-MCNC: 0.68 MG/DL (ref 0.55–1.02)
DIFFERENTIAL METHOD BLD: ABNORMAL
EOSINOPHIL # BLD: 0.04 K/UL (ref 0–0.4)
EOSINOPHIL NFR BLD: 1 % (ref 0–7)
ERYTHROCYTE [DISTWIDTH] IN BLOOD BY AUTOMATED COUNT: 12 % (ref 11.5–14.5)
GLUCOSE SERPL-MCNC: 89 MG/DL (ref 65–100)
HCT VFR BLD AUTO: 32.2 % (ref 35–47)
HGB BLD-MCNC: 11 G/DL (ref 11.5–16)
IMM GRANULOCYTES # BLD AUTO: 0 K/UL (ref 0–0.04)
IMM GRANULOCYTES NFR BLD AUTO: 0 % (ref 0–0.5)
LYMPHOCYTES # BLD: 0.99 K/UL (ref 0.8–3.5)
LYMPHOCYTES NFR BLD: 23.8 % (ref 12–49)
MCH RBC QN AUTO: 31.6 PG (ref 26–34)
MCHC RBC AUTO-ENTMCNC: 34.2 G/DL (ref 30–36.5)
MCV RBC AUTO: 92.5 FL (ref 80–99)
MONOCYTES # BLD: 0.46 K/UL (ref 0–1)
MONOCYTES NFR BLD: 11.1 % (ref 5–13)
NEUTS SEG # BLD: 2.64 K/UL (ref 1.8–8)
NEUTS SEG NFR BLD: 63.4 % (ref 32–75)
NRBC # BLD: 0 K/UL (ref 0–0.01)
NRBC BLD-RTO: 0 PER 100 WBC
PLATELET # BLD AUTO: 189 K/UL (ref 150–400)
PMV BLD AUTO: 10.4 FL (ref 8.9–12.9)
POTASSIUM SERPL-SCNC: 3.8 MMOL/L (ref 3.5–5.1)
RBC # BLD AUTO: 3.48 M/UL (ref 3.8–5.2)
SERVICE CMNT-IMP: ABNORMAL
SODIUM SERPL-SCNC: 141 MMOL/L (ref 136–145)
WBC # BLD AUTO: 4.2 K/UL (ref 3.6–11)

## 2025-04-10 PROCEDURE — 6370000000 HC RX 637 (ALT 250 FOR IP): Performed by: INTERNAL MEDICINE

## 2025-04-10 PROCEDURE — 94761 N-INVAS EAR/PLS OXIMETRY MLT: CPT

## 2025-04-10 PROCEDURE — 6370000000 HC RX 637 (ALT 250 FOR IP)

## 2025-04-10 PROCEDURE — 2580000003 HC RX 258

## 2025-04-10 PROCEDURE — 6370000000 HC RX 637 (ALT 250 FOR IP): Performed by: HOSPITALIST

## 2025-04-10 PROCEDURE — 1100000000 HC RM PRIVATE

## 2025-04-10 PROCEDURE — 36415 COLL VENOUS BLD VENIPUNCTURE: CPT

## 2025-04-10 PROCEDURE — 2500000003 HC RX 250 WO HCPCS

## 2025-04-10 PROCEDURE — 6360000002 HC RX W HCPCS: Performed by: HOSPITALIST

## 2025-04-10 PROCEDURE — 2500000003 HC RX 250 WO HCPCS: Performed by: HOSPITALIST

## 2025-04-10 PROCEDURE — 85025 COMPLETE CBC W/AUTO DIFF WBC: CPT

## 2025-04-10 PROCEDURE — 6360000002 HC RX W HCPCS

## 2025-04-10 PROCEDURE — 80048 BASIC METABOLIC PNL TOTAL CA: CPT

## 2025-04-10 PROCEDURE — 99233 SBSQ HOSP IP/OBS HIGH 50: CPT

## 2025-04-10 RX ORDER — ZOLPIDEM TARTRATE 5 MG/1
5 TABLET ORAL ONCE
Status: COMPLETED | OUTPATIENT
Start: 2025-04-10 | End: 2025-04-10

## 2025-04-10 RX ADMIN — ENOXAPARIN SODIUM 40 MG: 100 INJECTION SUBCUTANEOUS at 09:46

## 2025-04-10 RX ADMIN — SODIUM CHLORIDE, PRESERVATIVE FREE 10 ML: 5 INJECTION INTRAVENOUS at 20:05

## 2025-04-10 RX ADMIN — WATER 1000 MG: 1 INJECTION INTRAMUSCULAR; INTRAVENOUS; SUBCUTANEOUS at 16:01

## 2025-04-10 RX ADMIN — CEFTRIAXONE 1000 MG: 1 INJECTION, POWDER, FOR SOLUTION INTRAMUSCULAR; INTRAVENOUS at 11:47

## 2025-04-10 RX ADMIN — BUTALBITAL, ACETAMINOPHEN, AND CAFFEINE 1 TABLET: 325; 50; 40 TABLET ORAL at 20:05

## 2025-04-10 RX ADMIN — EZETIMIBE 10 MG: 10 TABLET ORAL at 09:46

## 2025-04-10 RX ADMIN — HYDROXYCHLOROQUINE SULFATE 200 MG: 200 TABLET ORAL at 09:46

## 2025-04-10 RX ADMIN — ZOLPIDEM TARTRATE 5 MG: 5 TABLET ORAL at 20:25

## 2025-04-10 RX ADMIN — LOSARTAN POTASSIUM 50 MG: 50 TABLET, FILM COATED ORAL at 09:46

## 2025-04-10 ASSESSMENT — PAIN SCALES - GENERAL
PAINLEVEL_OUTOF10: 9
PAINLEVEL_OUTOF10: 9

## 2025-04-10 ASSESSMENT — PAIN DESCRIPTION - LOCATION: LOCATION: HEAD

## 2025-04-10 ASSESSMENT — PAIN - FUNCTIONAL ASSESSMENT: PAIN_FUNCTIONAL_ASSESSMENT: ACTIVITIES ARE NOT PREVENTED

## 2025-04-10 ASSESSMENT — PAIN DESCRIPTION - DESCRIPTORS: DESCRIPTORS: THROBBING

## 2025-04-10 NOTE — CONSULTS
Infectious Disease Consult    Impression/Plan     64 y.o. female with past medical Hx of hypertension, rheumatoid arthritis, hyperlipidemia, CKD stage III, on chronic suppressive antibiotic per her gynecologist who presented to the ED with concerns of worsening UTI, admitted for sepsis related to pyelonephritis and is being seen for UTI with multiple antibiotic allergies.      E. coli UTI/pyelonephritis  Fever/tachycardia  Multiple antibiotic allergies  Hx recurrent UTI with E. coli, Proteus mirabilis    Blood cultures remain without growth but has been on Macrobid and Bactrim for prophylaxis.  Urine culture E. coli resistant to fluoroquinolones, Bactrim.  Sensitive to Macrobid, cefazolin, ceftriaxone, cefepime, Zosyn, meropenem.    Allergies to penicillin and Keflex with rash, azithromycin.  Unfortunately no oral options available and limited due to allergies.    Stopped levaquin. Appears she had cefdinir in past, therefore trial of ceftriaxone 1g via slow infusion. If tolerated administer additional ceftriaxone 1 g today. If tolerates to complete 10 days for pyelonephritis.     If she does not tolerate ceftriaxone, start aztreonam.       D/w Dr. Atkinson, pt, pharmacist, Dr. Puga         Anti-infectives:   Levaquin  S/p aztreonam    Subjective:   Date of Consultation:  April 10, 2025  Date of Admission: 4/8/2025   Referring Physician: Harshal Becerrafera    Patient is a 64 y.o. female with past medical Hx of hypertension, rheumatoid arthritis, hyperlipidemia, CKD stage III, on chronic suppressive antibiotic per her gynecologist who presented to the ED with concerns of worsening UTI, admitted for sepsis related to pyelonephritis and is being seen for UTI with multiple antibiotic allergies.      Followed by GYN Dr. Sarmiento for recurrent UTIs.  Has been on alternating Bactrim and Macrobid for prophylaxis.  Reports urgency, frequency, left flank pain.  Has multiple allergies as been penicillin, Keflex with rash.  Bactrim

## 2025-04-11 VITALS
WEIGHT: 140 LBS | SYSTOLIC BLOOD PRESSURE: 121 MMHG | HEART RATE: 92 BPM | DIASTOLIC BLOOD PRESSURE: 77 MMHG | OXYGEN SATURATION: 98 % | HEIGHT: 66 IN | RESPIRATION RATE: 12 BRPM | BODY MASS INDEX: 22.5 KG/M2 | TEMPERATURE: 98.4 F

## 2025-04-11 LAB
ANION GAP SERPL CALC-SCNC: 6 MMOL/L (ref 2–12)
BUN SERPL-MCNC: 10 MG/DL (ref 6–20)
BUN/CREAT SERPL: 17 (ref 12–20)
CALCIUM SERPL-MCNC: 9 MG/DL (ref 8.5–10.1)
CHLORIDE SERPL-SCNC: 110 MMOL/L (ref 97–108)
CO2 SERPL-SCNC: 25 MMOL/L (ref 21–32)
CREAT SERPL-MCNC: 0.59 MG/DL (ref 0.55–1.02)
GLUCOSE SERPL-MCNC: 95 MG/DL (ref 65–100)
POTASSIUM SERPL-SCNC: 3.7 MMOL/L (ref 3.5–5.1)
SODIUM SERPL-SCNC: 141 MMOL/L (ref 136–145)

## 2025-04-11 PROCEDURE — 2500000003 HC RX 250 WO HCPCS

## 2025-04-11 PROCEDURE — 99233 SBSQ HOSP IP/OBS HIGH 50: CPT

## 2025-04-11 PROCEDURE — 6360000002 HC RX W HCPCS

## 2025-04-11 PROCEDURE — 6370000000 HC RX 637 (ALT 250 FOR IP): Performed by: HOSPITALIST

## 2025-04-11 PROCEDURE — 6360000002 HC RX W HCPCS: Performed by: HOSPITALIST

## 2025-04-11 PROCEDURE — 2500000003 HC RX 250 WO HCPCS: Performed by: HOSPITALIST

## 2025-04-11 PROCEDURE — 36415 COLL VENOUS BLD VENIPUNCTURE: CPT

## 2025-04-11 PROCEDURE — 36410 VNPNXR 3YR/> PHY/QHP DX/THER: CPT

## 2025-04-11 PROCEDURE — 6370000000 HC RX 637 (ALT 250 FOR IP): Performed by: INTERNAL MEDICINE

## 2025-04-11 PROCEDURE — 80048 BASIC METABOLIC PNL TOTAL CA: CPT

## 2025-04-11 PROCEDURE — 94761 N-INVAS EAR/PLS OXIMETRY MLT: CPT

## 2025-04-11 RX ADMIN — WATER 2000 MG: 1 INJECTION INTRAMUSCULAR; INTRAVENOUS; SUBCUTANEOUS at 11:03

## 2025-04-11 RX ADMIN — LOSARTAN POTASSIUM 50 MG: 50 TABLET, FILM COATED ORAL at 08:10

## 2025-04-11 RX ADMIN — HYDROXYCHLOROQUINE SULFATE 200 MG: 200 TABLET ORAL at 08:10

## 2025-04-11 RX ADMIN — SODIUM CHLORIDE, PRESERVATIVE FREE 10 ML: 5 INJECTION INTRAVENOUS at 08:37

## 2025-04-11 RX ADMIN — EZETIMIBE 10 MG: 10 TABLET ORAL at 08:10

## 2025-04-11 RX ADMIN — BUTALBITAL, ACETAMINOPHEN, AND CAFFEINE 1 TABLET: 325; 50; 40 TABLET ORAL at 08:19

## 2025-04-11 RX ADMIN — ENOXAPARIN SODIUM 40 MG: 100 INJECTION SUBCUTANEOUS at 08:09

## 2025-04-11 NOTE — PROCEDURES
Midline Line Insertion Procedure Note    Procedure: Insertion of #4 FR/18G Midline    Indications:  Home IV therapy    Procedure Details   Informed consent was obtained for the procedure, including sedation.  Risks of lung perforation, hemorrhage, and adverse drug reaction were discussed.     #4 FR/18G Midline inserted to the R Basilic vein per hospital protocol.   Blood return:  yes    Findings:  Catheter inserted to 17 cm, with 0 cm. Exposed. Mid upper arm circumference is 26 cm.  There were no changes to vital signs. Catheter was flushed with 20 cc NS. Patient did tolerate procedure well.    All components of kit accounted for and discarded post insertion. Handoff given to Barbie JANE    Recommendations:    Midline Brochure given to patient with teaching instruction.PROCEDURE NOTE  Date: 4/11/2025   Name: Leida SHAW Avila  YOB: 1960    Procedures

## 2025-04-11 NOTE — DISCHARGE SUMMARY
Hospitalist Discharge Summary     Patient ID:    Leida Avila  [id number]  64 y.o.  1960    Admit date of service: 4/8/2025    Discharge date of service: 4/11/2025    Admission Diagnoses: Pyelonephritis [N12]  Tachycardia [R00.0]  Fever, unspecified fever cause [R50.9]    Chronic Diagnoses:      Discharge Medications:   Current Discharge Medication List        CONTINUE these medications which have NOT CHANGED    Details   Ubrogepant (UBRELVY) 100 MG TABS 1 po at migraine onset and repeat in 2 hours x 1 prn  Qty: 16 tablet, Refills: 11      albuterol sulfate HFA (PROVENTIL;VENTOLIN;PROAIR) 108 (90 Base) MCG/ACT inhaler Inhale 1 puff into the lungs every 4 hours as needed      albuterol (PROVENTIL) (2.5 MG/3ML) 0.083% nebulizer solution ceived the following from [facility]: Outside name: albuterol (PROVENTIL VENTOLIN) 2.5 mg /3 mL (0.083 %) nebu      Cholecalciferol 625 MCG (51269 UT) CAPS Take 2 tablets by mouth daily      cyclobenzaprine (FLEXERIL) 10 MG tablet ceived the following from [facility]: Outside name: cyclobenzaprine (FLEXERIL) 10 mg tablet      Estradiol (VAGIFEM) 10 MCG TABS vaginal tablet Place 1 tablet vaginally daily ceived the following from [facility]: Outside name: Vagifem 10 mcg tab vaginal tablet      ezetimibe (ZETIA) 10 MG tablet Take 1 tablet by mouth daily ceived the following from [facility]: Outside name: ezetimibe (ZETIA) 10 mg tablet      hydroxychloroquine (PLAQUENIL) 200 MG tablet Take 1 tablet by mouth daily      losartan (COZAAR) 50 MG tablet TAKE ONE TABLET BY MOUTH DAILY      EPINEPHrine (EPIPEN) 0.3 MG/0.3ML SOAJ injection ceived the following from [facility]: Outside name: EPINEPHrine (EPIPEN) 0.3 mg/0.3 mL injection           STOP taking these medications       Galcanezumab-gnlm (EMGALITY) 120 MG/ML SOAJ Comments:   Reason for Stopping:         eletriptan (RELPAX) 40 MG tablet Comments:

## 2025-04-11 NOTE — PROGRESS NOTES
Dynamic Access Information was given to nurse for PICC line placement.   
Infectious Disease Services Note    Patient tolerated slow infusion of ceftriaxone. Ordered additional 1g dose to complete 2 g for today. Started 2g ceftriaxone for tomorrow            Nimisha Barahona APRN-NP   
Spiritual Care Partner Volunteer visited patient at [facility] in [facility]SPECIAL[facility] ONCOLOGY 1 on 4/8/2025     Documented by:  Louis Ricketts MDiv  Staff   Paging Service [phone] (BETTIE)    
Spiritual Health History and Assessment/Progress Note  [facility]    Initial Encounter,  ,  ,      Name: Leida Avila MRN: [id number]    Age: 64 y.o.     Sex: female   Language: English   Jew: Jewish   Pyelonephritis     Date: 4/8/2025            Total Time Calculated: 20 min              Spiritual Assessment began in [facility] EMERGENCY DEPARTMENT        Referral/Consult From: Patient   Encounter Overview/Reason: Initial Encounter  Service Provided For: Patient not available    Suzette, Belief, Meaning:   Patient has beliefs or practices that help with coping during difficult times  Family/Friends No family/friends present      Importance and Influence:  Patient has spiritual/personal beliefs that influence decisions regarding their health  Family/Friends No family/friends present    Community:  Patient expresses feelings of isolation: Other: Children, grandchildren and extended family live in [place].  Family/Friends No family/friends present    Assessment and Plan of Care:    visit for initial assessment. Pt abdirahman through family support. Pt is from [place], moved here 40 years ago and will move back in June. Pt is happily anticipating the move where her family and extended family live. Pt also finds support through friends and neighbors.Pt shared several recent hospitalizations for this issue. Pt received a phone call she wanted to take;  excused herself and shared  availability.    Patient Interventions include: Facilitated expression of thoughts and feelings, Explored spiritual coping/struggle/distress, and Affirmed coping skills/support systems  Family/Friends Interventions include: No family/friends present    Patient Plan of Care: Spiritual Care available upon further referral  Family/Friends Plan of Care: No family/friends present    Electronically signed by BRITTANY Cortes on 4/8/2025 at 2:53 PM    
Spiritual Health History and Assessment/Progress Note  [facility]    Loneliness/Social Isolation,  ,  ,      Name: Leida Avila MRN: [id number]    Age: 64 y.o.     Sex: female   Language: English   Holiness: Adventist   Pyelonephritis     Date: 4/9/2025            Total Time Calculated: 20 min              Spiritual Assessment continued in [facility] ONCOLOGY [facility]        Referral/Consult From: Multi-disciplinary team   Encounter Overview/Reason: Loneliness/Social Isolation  Service Provided For: Patient    Suzette, Belief, Meaning:   Patient identifies as spiritual and has beliefs or practices that help with coping during difficult times  Family/Friends No family/friends present      Importance and Influence:  Patient has spiritual/personal beliefs that influence decisions regarding their health  Family/Friends No family/friends present    Community:  Patient expresses feelings of isolation: Other: immediate and extended family live in [place].  Family/Friends No family/friends present    Assessment and Plan of Care:    follow up visit for pt with a referral. Pt shared her family lives in [place]. 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 Pt shared stories form her position at school. Pt finds purpose and meaning from her work. Pt abdirahman through her suzette in a God, and chooses not to affiliate with a denomination at this time. Pt finds olivia in being outside, especially hiking. No spiritual or emotional issues expressed. Shared  availability.     Patient Interventions include: Facilitated expression of thoughts and feelings, Explored spiritual coping/struggle/distress, Engaged in theological reflection, and Affirmed coping skills/support systems  Family/Friends Interventions include: No family/friends present    Patient Plan of Care: Spiritual Care available upon further referral  Family/Friends Plan of Care: Spiritual Care available upon 
 No cyanosis or clubbing  Skin:  No rashes or ulcers, skin turgor is good  Neuro:  Cranial nerves are grossly intact, no focal motor weakness, follows commands appropriately  Psych:  Good insight, oriented to person, place and time, alert  __________________________________________________________________  Medications Reviewed: (see below)  Medications:     Current Facility-Administered Medications   Medication Dose Route Frequency    ketorolac (TORADOL) injection 15 mg  15 mg IntraVENous Q6H PRN    butalbital-acetaminophen-caffeine (FIORICET, ESGIC) per tablet 1 tablet  1 tablet Oral Q4H PRN    levoFLOXacin (LEVAQUIN) 500 MG/100ML infusion 500 mg  500 mg IntraVENous Q24H    albuterol (PROVENTIL) (2.5 MG/3ML) 0.083% nebulizer solution 2.5 mg  2.5 mg Nebulization Q4H PRN    hydroxychloroquine (PLAQUENIL) tablet 200 mg  200 mg Oral Daily    ezetimibe (ZETIA) tablet 10 mg  10 mg Oral Daily    losartan (COZAAR) tablet 50 mg  50 mg Oral Daily    sodium chloride flush 0.9 % injection 5-40 mL  5-40 mL IntraVENous 2 times per day    sodium chloride flush 0.9 % injection 5-40 mL  5-40 mL IntraVENous PRN    0.9 % sodium chloride infusion   IntraVENous PRN    potassium chloride (KLOR-CON) extended release tablet 40 mEq  40 mEq Oral PRN    Or    potassium bicarb-citric acid (EFFER-K) effervescent tablet 40 mEq  40 mEq Oral PRN    Or    potassium chloride 10 mEq/100 mL IVPB (Peripheral Line)  10 mEq IntraVENous PRN    magnesium sulfate 2000 mg in 50 mL IVPB premix  2,000 mg IntraVENous PRN    enoxaparin (LOVENOX) injection 40 mg  40 mg SubCUTAneous Daily    ondansetron (ZOFRAN-ODT) disintegrating tablet 4 mg  4 mg Oral Q8H PRN    Or    ondansetron (ZOFRAN) injection 4 mg  4 mg IntraVENous Q6H PRN    polyethylene glycol (GLYCOLAX) packet 17 g  17 g Oral Daily PRN    acetaminophen (TYLENOL) tablet 650 mg  650 mg Oral Q6H PRN    Or    acetaminophen (TYLENOL) suppository 650 mg  650 mg Rectal Q6H PRN        Lab Data Reviewed: (see 
allergy but is tolerating.  ED workup with tachycardia, fever 104.5. COVID and flu negative. UA with WBC 10-20, bacteria negative. Urine cx growing E.coli (R-Levaquin, cipro, Bactrim. S-ampicillin, ceftriaxone, cefazolin, cefepime, Zosyn, meropenem, Macrobid).  Blood cultures without growth but patient has been on antibiotics, see above.  CTAP with hypoenhancement of left kidney concerning for infectious process.  Started on aztreonam and Levaquin.  Slight suprapubic tenderness and slight left CVA tenderness on exam.  Feels better.  She believes she had ceftriaxone in the past.  Of note patient does have rheumatoid arthritis but has been off immunologic since 2023 and only takes Plaquenil. Infectious disease services was consulted to assist with management of UTI with multiple antibiotic allergies.      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 Degroot      RA (rheumatoid arthritis) ([facility]) 10/25/2012    Skipped beats       Family History   Problem Relation Age of Onset    Asthma Other     Diabetes Maternal Grandfather     Cancer Other     Migraines Other     Stroke Other     Other Other         scleroderma    Asthma Maternal Aunt     Rheum Arthritis Mother     Asthma Mother     Hypertension Mother       Social History     Tobacco Use    Smoking status: Never    Smokeless tobacco: Never   Substance Use Topics    Alcohol use: Yes     Comment: social     Past 
    Recent Labs     04/08/25  1057 04/09/25  0339   * 139   K 3.6 3.7   CL 97 109*   CO2 25 25   BUN 19 21*   ALT 19  --      No results found for: \"GLUCPOC\"  No results for input(s): \"PH\", \"PCO2\", \"PO2\", \"HCO3\", \"FIO2\" in the last 72 hours.  No results for input(s): \"INR\" in the last 72 hours.  [unfilled]    I have reviewed notes of prior 24hr.    Other pertinent lab:     Total time: -35- minutes. I personally saw and examined the patient during this time period.  Greater than 50% of this time was spent in counseling and coordination of care.    I personally reviewed chart, notes, data and current medications in the medical record.  I have personally examined and treated the patient at bedside during this period.                 Care Plan discussed with: Patient, Nursing Staff, and >50% of time spent in counseling and coordination of care    Discussed:  Care Plan    Prophylaxis:  Lovenox    Disposition:  Home w/Family           ___________________________________________________    Attending Physician: Harshal Puga MD      
acetaminophen (TYLENOL) tablet 650 mg  650 mg Oral Q6H PRN    Or    acetaminophen (TYLENOL) suppository 650 mg  650 mg Rectal Q6H PRN        Lab Data Reviewed: (see below)  Lab Review:     Recent Labs     04/08/25  1057 04/09/25  0339 04/10/25  0548   WBC 10.9 5.5 4.2   HGB 13.4 11.2* 11.0*   HCT 38.4 33.2* 32.2*    160 189     Recent Labs     04/08/25  1057 04/09/25  0339 04/10/25  0548 04/11/25  0504   * 139 141 141   K 3.6 3.7 3.8 3.7   CL 97 109* 112* 110*   CO2 25 25 22 25   BUN 19 21* 14 10   ALT 19  --   --   --      No results found for: \"GLUCPOC\"  No results for input(s): \"PH\", \"PCO2\", \"PO2\", \"HCO3\", \"FIO2\" in the last 72 hours.  No results for input(s): \"INR\" in the last 72 hours.  [unfilled]    I have reviewed notes of prior 24hr.    Other pertinent lab:     Total time: -35- minutes. I personally saw and examined the patient during this time period.  Greater than 50% of this time was spent in counseling and coordination of care.    I personally reviewed chart, notes, data and current medications in the medical record.  I have personally examined and treated the patient at bedside during this period.                 Care Plan discussed with: Patient, Nursing Staff, and >50% of time spent in counseling and coordination of care    Discussed:  Care Plan    Prophylaxis:  Lovenox    Disposition:  Home w/Family           ___________________________________________________    Attending Physician: Harshal Puga MD

## 2025-04-11 NOTE — CARE COORDINATION
04/11/25 1320   Service Assessment   Patient Orientation Alert and Oriented   Primary Caregiver Self   Support Systems Children   Services At/After Discharge   Transition of Care Consult (CM Consult) Discharge Planning   Services At/After Discharge IV Therapy  ([facility])   Mode of Transport at Discharge Other (see comment)  (family)   Confirm Follow Up Transport Family   Condition of Participation: Discharge Planning   The Patient and/or Patient Representative was provided with a Choice of Provider? Patient   The Patient and/Or Patient Representative agree with the Discharge Plan? Yes   Freedom of Choice list was provided with basic dialogue that supports the patient's individualized plan of care/goals, treatment preferences, and shares the quality data associated with the providers?  Yes     CM notified of Pt discharging home today with Home IV infusion services through [facility]. Nursing care will be also provided by [facility]. Bedside teaching completed. No further discharge needs indicated at this time. Pt is cleared from CM standpoint.     BECKY Hood, CM  [facility] Care Manager  [phone]

## 2025-04-11 NOTE — DISCHARGE INSTRUCTIONS
HOSPITALIST DISCHARGE INSTRUCTIONS  NAME:  Leida Avila   :  1960   MRN:  [id number]     Date/Time:  2025 9:45 AM    ADMIT DATE: 2025     DISCHARGE DATE: 2025     DISCHARGE DIAGNOSIS:  pyelonephritis    DISCHARGE INSTRUCTIONS:  Thank you for allowing us to participate in your care. Your discharging Hospitalist is Harshal Puga MD. You were admitted for evaluation and treatment of the above.        MEDICATIONS:    It is important that you take the medication exactly as they are prescribed.   Keep your medication in the bottles provided by the pharmacist and keep a list of the medication names, dosages, and times to be taken in your wallet.   Do not take other medications without consulting your doctor.             If you experience any of the following symptoms then please call your primary care physician or return to the emergency room if you cannot get hold of your doctor:  Fever, chills, nausea, vomiting, diarrhea, change in mentation, falling, bleeding, shortness of breath    Follow Up:  Please call the below provider to arrange hospital follow up appointment      No follow-up provider specified.    For questions regarding your Hospitalization or to contact the Hospital Medicine team, please call [phone].      Information obtained by :  I understand that if any problems occur once I am at home I am to contact my physician.    I understand and acknowledge receipt of the instructions indicated above.                                                                                                                                           Physician's or R.N.'s Signature                                                                  Date/Time                                                                                                                                              Patient or Representative Signature

## 2025-04-11 NOTE — PLAN OF CARE
INFECTIOUS DISEASE discharge plan:    Diagnosis: Pyelonephritis    Antibiotic: ceftriaxone IV 2 g Q 24 hrs through 4/17/25, inclusive    PICC line/Midline insertion for outpatient antibiotic.     Routine PICC/Ranulfo/ Portacath Care including PRN catheter flow management    Weekly labs with results fax to # [phone]. Call critical lab values to [phone].   [x] CBC w/diff  [x] BUN/Creatinine  [x] AST, ALT  [] CPK  [] CRP, ESR      Home Health to pull PICC line/Midline after last dose or send to IR for Ranulfo removal    May send to IR for line evaluation or replacement PRN Phone # [phone]

## 2025-04-11 NOTE — CARE COORDINATION
11:44 AM  CM received a response from [facility]. CM was informed that they would come around noon for bedside teaching with Pt. Pt has been contacted and notified of this.       11:23 AM  CM notified of Pt needing IV ABX arranged at discharge. CM sent referral to [facility] for review.         BECKY Hood, CM  [facility] Care Manager  [phone]

## 2025-04-13 LAB
BACTERIA SPEC CULT: NORMAL
BACTERIA SPEC CULT: NORMAL
SERVICE CMNT-IMP: NORMAL
SERVICE CMNT-IMP: NORMAL